# Patient Record
Sex: FEMALE | Race: BLACK OR AFRICAN AMERICAN | NOT HISPANIC OR LATINO | Employment: OTHER | URBAN - METROPOLITAN AREA
[De-identification: names, ages, dates, MRNs, and addresses within clinical notes are randomized per-mention and may not be internally consistent; named-entity substitution may affect disease eponyms.]

---

## 2023-03-22 DIAGNOSIS — I10 PRIMARY HYPERTENSION: Primary | ICD-10-CM

## 2023-03-23 RX ORDER — ENALAPRIL MALEATE 20 MG/1
40 TABLET ORAL DAILY
Qty: 90 TABLET | Refills: 1 | Status: SHIPPED | OUTPATIENT
Start: 2023-03-23 | End: 2023-06-27 | Stop reason: SDUPTHER

## 2023-03-23 RX ORDER — ENALAPRIL MALEATE 20 MG/1
2 TABLET ORAL DAILY
COMMUNITY
Start: 2012-02-15 | End: 2023-03-23 | Stop reason: SDUPTHER

## 2023-03-27 ENCOUNTER — HOSPITAL ENCOUNTER (OUTPATIENT)
Dept: DATA CONVERSION | Facility: HOSPITAL | Age: 68
End: 2023-03-28
Attending: INTERNAL MEDICINE | Admitting: INTERNAL MEDICINE
Payer: MEDICARE

## 2023-03-27 DIAGNOSIS — E78.5 HYPERLIPIDEMIA, UNSPECIFIED: ICD-10-CM

## 2023-03-27 DIAGNOSIS — I10 ESSENTIAL (PRIMARY) HYPERTENSION: ICD-10-CM

## 2023-03-27 DIAGNOSIS — R07.9 CHEST PAIN, UNSPECIFIED: ICD-10-CM

## 2023-03-27 DIAGNOSIS — I20.89 OTHER FORMS OF ANGINA PECTORIS (CMS-HCC): ICD-10-CM

## 2023-03-27 DIAGNOSIS — I25.118 ATHEROSCLEROTIC HEART DISEASE OF NATIVE CORONARY ARTERY WITH OTHER FORMS OF ANGINA PECTORIS (CMS-HCC): ICD-10-CM

## 2023-03-27 LAB
ANION GAP IN SER/PLAS: 11 MMOL/L (ref 10–20)
CALCIUM (MG/DL) IN SER/PLAS: 10.9 MG/DL (ref 8.6–10.6)
CARBON DIOXIDE, TOTAL (MMOL/L) IN SER/PLAS: 29 MMOL/L (ref 21–32)
CHLORIDE (MMOL/L) IN SER/PLAS: 102 MMOL/L (ref 98–107)
CREATININE (MG/DL) IN SER/PLAS: 0.88 MG/DL (ref 0.5–1.05)
ERYTHROCYTE DISTRIBUTION WIDTH (RATIO) BY AUTOMATED COUNT: 16 % (ref 11.5–14.5)
ERYTHROCYTE MEAN CORPUSCULAR HEMOGLOBIN CONCENTRATION (G/DL) BY AUTOMATED: 34.5 G/DL (ref 32–36)
ERYTHROCYTE MEAN CORPUSCULAR VOLUME (FL) BY AUTOMATED COUNT: 81 FL (ref 80–100)
ERYTHROCYTES (10*6/UL) IN BLOOD BY AUTOMATED COUNT: 3.6 X10E12/L (ref 4–5.2)
GFR FEMALE: 71 ML/MIN/1.73M2
GLUCOSE (MG/DL) IN SER/PLAS: 197 MG/DL (ref 74–99)
HEMATOCRIT (%) IN BLOOD BY AUTOMATED COUNT: 29 % (ref 36–46)
HEMOGLOBIN (G/DL) IN BLOOD: 10 G/DL (ref 12–16)
LEUKOCYTES (10*3/UL) IN BLOOD BY AUTOMATED COUNT: 7 X10E9/L (ref 4.4–11.3)
NRBC (PER 100 WBCS) BY AUTOMATED COUNT: 0 /100 WBC (ref 0–0)
PLATELETS (10*3/UL) IN BLOOD AUTOMATED COUNT: 207 X10E9/L (ref 150–450)
POTASSIUM (MMOL/L) IN SER/PLAS: 4.4 MMOL/L (ref 3.5–5.3)
SODIUM (MMOL/L) IN SER/PLAS: 138 MMOL/L (ref 136–145)
UREA NITROGEN (MG/DL) IN SER/PLAS: 24 MG/DL (ref 6–23)

## 2023-03-29 LAB
POC ACTIVATED CLOTTING TIME LOW RANGE: 254 SECONDS (ref 89–169)
POC ACTIVATED CLOTTING TIME LOW RANGE: 268 SECONDS (ref 89–169)
POC ACTIVATED CLOTTING TIME LOW RANGE: 289 SECONDS (ref 89–169)
POC ACTIVATED CLOTTING TIME LOW RANGE: 316 SECONDS (ref 89–169)

## 2023-05-23 LAB
ESTIMATED AVERAGE GLUCOSE FOR HBA1C: 177 MG/DL
HEMOGLOBIN A1C/HEMOGLOBIN TOTAL IN BLOOD: 7.8 %

## 2023-06-27 ENCOUNTER — TELEMEDICINE (OUTPATIENT)
Dept: PRIMARY CARE | Facility: CLINIC | Age: 68
End: 2023-06-27
Payer: MEDICARE

## 2023-06-27 DIAGNOSIS — Z95.820 S/P ANGIOPLASTY WITH STENT: ICD-10-CM

## 2023-06-27 DIAGNOSIS — Z98.84 S/P GASTRIC BYPASS: Primary | ICD-10-CM

## 2023-06-27 DIAGNOSIS — E11.9 DIABETES MELLITUS TYPE 2 IN NONOBESE (MULTI): ICD-10-CM

## 2023-06-27 DIAGNOSIS — I10 PRIMARY HYPERTENSION: ICD-10-CM

## 2023-06-27 PROBLEM — F80.81 STUTTERING: Status: ACTIVE | Noted: 2023-06-27

## 2023-06-27 PROBLEM — D64.9 ANEMIA: Status: ACTIVE | Noted: 2023-06-27

## 2023-06-27 PROBLEM — E78.5 HYPERLIPIDEMIA: Status: ACTIVE | Noted: 2023-06-27

## 2023-06-27 PROBLEM — E66.9 OBESITY: Status: ACTIVE | Noted: 2023-06-27

## 2023-06-27 PROBLEM — J30.9 ALLERGIC RHINITIS: Status: ACTIVE | Noted: 2023-06-27

## 2023-06-27 PROBLEM — M75.02 ADHESIVE CAPSULITIS OF BOTH SHOULDERS: Status: ACTIVE | Noted: 2023-06-27

## 2023-06-27 PROBLEM — G47.33 OSA (OBSTRUCTIVE SLEEP APNEA): Status: ACTIVE | Noted: 2023-06-27

## 2023-06-27 PROBLEM — Z98.61 STATUS POST CORONARY ANGIOPLASTY: Status: ACTIVE | Noted: 2023-06-27

## 2023-06-27 PROBLEM — M75.01 ADHESIVE CAPSULITIS OF BOTH SHOULDERS: Status: ACTIVE | Noted: 2023-06-27

## 2023-06-27 PROCEDURE — 99214 OFFICE O/P EST MOD 30 MIN: CPT | Performed by: FAMILY MEDICINE

## 2023-06-27 RX ORDER — ENALAPRIL MALEATE 20 MG/1
40 TABLET ORAL DAILY
Qty: 180 TABLET | Refills: 1 | Status: SHIPPED | OUTPATIENT
Start: 2023-06-27 | End: 2023-06-28 | Stop reason: SDUPTHER

## 2023-06-27 RX ORDER — ENALAPRIL MALEATE 20 MG/1
40 TABLET ORAL DAILY
Qty: 14 TABLET | Refills: 1 | Status: SHIPPED | OUTPATIENT
Start: 2023-06-27 | End: 2023-06-27 | Stop reason: SDUPTHER

## 2023-06-27 RX ORDER — ENALAPRIL MALEATE 20 MG/1
40 TABLET ORAL DAILY
Qty: 7 TABLET | Refills: 1 | Status: SHIPPED | OUTPATIENT
Start: 2023-06-27 | End: 2023-06-27 | Stop reason: SDUPTHER

## 2023-06-27 NOTE — PROGRESS NOTES
Subjective   Patient ID: Rita Rubi is a 68 y.o. female who presents for a routine follow-up visit and medication refills..      HPI  Ms Rubi is a 67 yo F with a history of  Coronary artery disease s/p PCI to mid LCx and distal RCA 03/27/2023 with BENITO.  She has ASCVD risk factors of hypertension, dyslipidemia, DM II on OHA and family history of premature CAD ( CABG in mother in her 40s and sudden death at age 50). Additionally she has a BMI of 33 after bariatric surgery procedure.    She is here today for her medication refill.  She states that her home BP is well controlled.   A review of system was completed.  All systems were reviewed and were normal, except for the ones that are listed in the HPI.    Objective   Physical Exam    Assessment/Plan   Problem List Items Addressed This Visit          Circulatory    Primary hypertension     -Controlled at home according to the patient.   -Medication refilled.         Relevant Medications    enalapril (Vasotec) 20 mg tablet    S/P angioplasty with stent     -currently appropriately on DAPT with aspirin and clopidogrel which she has been tolerating without cost or bleeding concerns. For 12 months and aspirin 81mg thereafter. . PO metoprolol succinate 25mg qd / atorvastatin 40mg qd / Sl nitroglycerin prn are also being tolerated.  -Follow-up with cardiologist as tolerated.   -Referral to a nutritionist declined today.            Digestive    S/P gastric bypass - Primary     -Diet and life style reemphasized.              Endocrine/Metabolic    Diabetes mellitus type 2 in nonobese (CMS/HCC)     -Improving control.   -Currently on Metformin.   -Jardiance recommended. Cost to be considered.

## 2023-06-27 NOTE — ASSESSMENT & PLAN NOTE
-currently appropriately on DAPT with aspirin and clopidogrel which she has been tolerating without cost or bleeding concerns. For 12 months and aspirin 81mg thereafter. . PO metoprolol succinate 25mg qd / atorvastatin 40mg qd / Sl nitroglycerin prn are also being tolerated.  -Follow-up with cardiologist as tolerated.   -Referral to a nutritionist declined today.

## 2023-06-28 DIAGNOSIS — I10 PRIMARY HYPERTENSION: ICD-10-CM

## 2023-06-28 NOTE — TELEPHONE ENCOUNTER
Patient calling because you canceled her Enalapril 20 mg prescription for 1 week at her local pharmacy whils she waits on her mail order. Please resubmit.

## 2023-06-30 RX ORDER — ENALAPRIL MALEATE 20 MG/1
40 TABLET ORAL DAILY
Qty: 14 TABLET | Refills: 0 | Status: SHIPPED | OUTPATIENT
Start: 2023-06-30 | End: 2024-01-25 | Stop reason: WASHOUT

## 2023-08-11 LAB
C REACTIVE PROTEIN (MG/L) IN SER/PLAS: 0.66 MG/DL
CHOLESTEROL (MG/DL) IN SER/PLAS: 128 MG/DL (ref 0–199)
CHOLESTEROL IN HDL (MG/DL) IN SER/PLAS: 46 MG/DL
CHOLESTEROL/HDL RATIO: 2.8
ESTIMATED AVERAGE GLUCOSE FOR HBA1C: 151 MG/DL
HEMOGLOBIN A1C/HEMOGLOBIN TOTAL IN BLOOD: 6.9 %
LDL: 63 MG/DL (ref 0–99)
TRIGLYCERIDE (MG/DL) IN SER/PLAS: 93 MG/DL (ref 0–149)
VLDL: 19 MG/DL (ref 0–40)

## 2023-09-07 VITALS — HEIGHT: 67 IN | BODY MASS INDEX: 32.7 KG/M2 | WEIGHT: 208.34 LBS

## 2023-09-11 ENCOUNTER — PATIENT OUTREACH (OUTPATIENT)
Dept: PRIMARY CARE | Facility: CLINIC | Age: 68
End: 2023-09-11
Payer: COMMERCIAL

## 2023-09-11 DIAGNOSIS — I63.9 CEREBROVASCULAR ACCIDENT (CVA), UNSPECIFIED MECHANISM (MULTI): ICD-10-CM

## 2023-09-11 DIAGNOSIS — E11.9 DIABETES MELLITUS TYPE 2 IN NONOBESE (MULTI): ICD-10-CM

## 2023-09-11 NOTE — PROGRESS NOTES
Discharge Facility: [facility]  Discharge Diagnosis: CVA  Admission Date: 9/9/23  Discharge Date: 9/10/23    PCP Appointment Date: 9/12/23 9:15am Dr Link    Specialist Appointment Date: TBD-I do not see scheduled appts as of this note    Follow-Up Appointment 02:  · Physician/Dept/Service Cardiology   · Call to Schedule in 2-3 days   · Comments Call to make an appointment.     Follow-Up Appointment 03:  · Physician/Dept/Service Neurology stroke   · Call to Schedule in 2 weeks   · Comments An appointment request has been sent to our [employer] service for scheduling.  If you do not receive a call in 2-3 business days, please contact the office and schedule your appointment. Please bring your ID, Insurance Card, Medication List and hospital discharge paperwork to your appointment.       Hospital Encounter and Summary: Linked         Noted on Discharge Summary:  Please take your blood pressure once a day, at a different time of the day each time, and keep a record. Take this record to your PCP appointment.    Continue all of your home medications as prescribed by your primary care doctor and cardiologist    Start taking your metformin tomorrow 9/11/2023    You will need to have an echocardiogram with bubble study done on an outpatient basis please call your cardiologist to have this test ordered    Come back tomorrow to the cardiology department and have your Holter monitor (zio patch) placed you will wear this for 2 weeks and send it back in for your cardiologist to read    Speak to your cardiologist about your medication called Plavix they may want to change it to something stronger due to your recent stroke.

## 2023-09-14 RX ORDER — METFORMIN HYDROCHLORIDE 500 MG/1
TABLET ORAL
Qty: 180 TABLET | Refills: 0 | Status: SHIPPED | OUTPATIENT
Start: 2023-09-14 | End: 2023-12-14

## 2023-09-14 NOTE — H&P
History & Physical Reviewed:   I have reviewed the History and Physical dated:  27-Feb-2023   History and Physical reviewed and relevant findings noted. Patient examined to review pertinent physical  findings.: No significant changes   Home Medications Reviewed: no changes noted   Allergies Reviewed: no changes noted       Airway/Sedation Assessment:  ·  Emotional Status calm   ·  Neurologic alert & oriented x 3   ·  Cardiovascular rhythm & rate regular   ·  GI/ soft, nontender     · Pulses present: Pedal Left, Pedal Right, Radial Left, Radial Right     ·  Mouth Opening OK yes   ·  Neck Flexibility OK yes   ·  Oropharyngeal Classification Class III   ·  ASA PS Classification ASA III   ·  Sedation Plan moderate sedation       ERAS (Enhanced Recovery After Surgery):  ·  ERAS Patient: no      Consent:   COVID-19 Consent:  ·  COVID-19 Risk Consent Surgeon has reviewed key risks related to the risk of leidy COVID-19 and if they contract COVID-19 what the risks are.     Attestation:   Note Completion:  I am a:  Resident/Fellow   Attending Attestation I saw and evaluated the patient.  I personally obtained the key and critical portions of the history and physical exam or was physically present for key and  critical portions performed by the resident/fellow. I reviewed the resident/fellow?s documentation and discussed the patient with the resident/fellow.  I agree with the resident/fellow?s medical decision making as documented in the note.     I personally evaluated the patient on 27-Mar-2023         Electronic Signatures:  Neela Rivas (Fellow))  (Signed 27-Mar-2023 10:20)   Authored: History & Physical Reviewed, Airway/Sedation,  Consent, Note Completion  Armond Leyva)  (Signed 29-Mar-2023 05:15)   Authored: YESSY, Note Completion   Co-Signer: History & Physical Reviewed, Airway/Sedation, Consent, Note Completion      Last Updated: 29-Mar-2023 05:15 by Armond Leyva)

## 2023-09-14 NOTE — DISCHARGE SUMMARY
Send Summary:   Discharge Summary Providers:  Provider Role Provider Name   · Attending Armond Leyva   · Referring Armond Leyva   · Primary Pretty Link       Note Recipients: Pretty Link MD Ukaigwe, Anene, MD       Discharge:    Summary:   Admission Date: .27-Mar-2023 08:57:00   Discharge Date: 28-Mar-2023   Attending Physician at Discharge: Jania Renee   Admission Reason: Observation s/p PCI   Final Discharge Diagnoses: S/P drug eluting coronary  stent placement, CAD (coronary artery disease)   Procedures: LHC/PCI   Condition at Discharge: Satisfactory   Disposition at Discharge: .Home   Vital Signs:        T   P  R  BP   MAP  SpO2   Value  36.4  87  16  94/57      99%  Date/Time 3/28 12:02 3/28 12:02 3/28 12:02 3/28 12:02    3/28 12:02  Range  (36C - 36.5C )  (80 - 96 )  (16 - 20 )  (94 - 145 )/ (57 - 76 )    (98% - 99% )    Date:            Weight/Scale Type:  Height:   27-Mar-2023 19:32  94.5  kg / standing  170.1  cm  Physical Exam:    HEENT:   EOMI, clear sclera, MMM, no lesions noted   RESP:     CTAB  CARDIO:  RRR, S1,S2  ABD:      Soft, nontender, + BS  EXT:       Bandage replaced over right radial access site, clean, no swelling or ecchymosis noted    Hospital Course:    Cath Attending: Dr. Leyva      03/27/23:  Rita Rubi is a 67 y/o F here for a LHC with possible PCI.   PMH: Her ASCVD risk factors are HTN, DLD, DM II with a FH of premature CAD. BMI of 33 after bariatric surgery procedure.  She had central chest pressure, worse with exertion & stress, relieved by rest.  She denies dyspnea, syncope or presyncope. No edema. The chest discomfort has been lifestyle limiting. This then led to evaluation by her PCP with a nuclear stress test.  This showed no perfusion defect consistent with ischemia.  CBC in Dec 2022, showed  anemia ( Hgb 10.4).    Pt. had  LHC/PCI via Right radial access. FFR was non significant of the LAD. Severe proximal LCx stenosis with a 3.0  x 18 mm BENITO was deployed. Successful PCI of dRCA into the PDA with a 2.25 x 22 mm deployed and post dilated with a 3.0 NC balloon. TR  Band applied for compression. Pt loaded on Plavix/ASA. Pt did stay the night for observation.     03/28/23:  Pt was seen this AM and will be discharged to home today. Access site checked and no swelling, oozing or any signs of hematoma noted. Instructions for activities to avoid this week, diet, medications and f/up appointments needed were all discussed. Pt.  express understanding.     REC:   -Continue with Dapt=Plavix 75 mg daily x min 6-12 months/ ASA 81 mg daily indefinitely  -Pt was ordered a 90 day supply of Plavix with 3 RF, which was picked up from [email or web address] pharmacy.  -Continue with high dose statin, BB, ACE and all other medications as previously prescribed.  -Hold Metformin for 48 hrs post procedure  -Cardiac rehab recommended  -Cardiac diet  -Will need to f/up with Dr. Leyva in 1-3 weeks post discharge.               Medical History:   S/P drug eluting coronary stent placement:    CAD (coronary artery disease):    Atypical chest pain:       Discharge Information:    and Continuing Care:   Lab Results - Pending:    None  Radiology Results - Pending: None   Discharge Instructions:    Activity:           May shower..            May not drive for 1 day(s).            No pushing, pulling, or lifting objects greater than 5 pounds for 1 week(s).            You are advised to go directly home from the hospital    Nutrition/Diet:           low cholesterol,  low fat    Wound Care:           Wound Site:   RIGHT WRIST          Wound Type:   surgical incision          Change Dressing:   daily          Cleanse With:   soap and water          Cover With:   SIMPLE BAND AID          Instructions:   no lotions, creams, or tub soaks          You may experience some tenderness, bruising or minimal inflammation.  If you have any concerns, you may contact the Cath Lab or if any of these  symptoms become excessive, contact your cardiologist or go to the emergency room          No tub baths, soaking, or swimming for one week          May shower the next day after your procedure    Discharge Medications: Home Medication   aspirin 81 mg oral delayed release tablet - 1 tab(s) orally once a day  enalapril 20 mg oral tablet - 2 tab(s) orally once a day  hydroCHLOROthiazide 25 mg oral tablet - 1 tab(s) orally once a day  Metoprolol Succinate ER 25 mg oral tablet, extended release - 1 tab(s) orally once a day  nitroglycerin 0.4 mg sublingual tablet - place 1 tablet under the tongue every 5 minutes for up to 3 doses as needed for chest pain, call 911 if pain persisits  omeprazole 20 mg oral delayed release capsule - 1 cap(s) orally once a day  empagliflozin 10 mg oral tablet - 1 tab(s) orally once a day  isosorbide mononitrate 30 mg oral tablet, extended release - 1 tab(s) orally once a day  atorvastatin 40 mg oral tablet - 1 tab(s) orally once a day (at bedtime)  Multiple Vitamins with Minerals oral tablet, chewable - 1 tab(s) orally 4 times a day ([employer])  metFORMIN 500 mg oral tablet - 1 tab(s) orally 2 times a day. RESUME TAKING ON ON 3/29/23  clopidogrel 75 mg oral tablet - 1 tab(s) orally once a day      PRN Medication     DNR Status:   ·  Code Status Code Status order at time of discharge: Full Code     Date & Time DC Summary Fax:   Date and time discharge summary faxed: 28-Mar-2023  13:26       Electronic Signatures:  Jania Renee (PAC)  (Signed 28-Mar-2023 13:26)   Authored: Send Summary, Summary Content, Medical History,  Ongoing Care, DNR Status, Date & Time DC Summary Fax, Note Completion      Last Updated: 28-Mar-2023 13:26 by Jania Renee (PAC)

## 2023-09-15 LAB
ALANINE AMINOTRANSFERASE (SGPT) (U/L) IN SER/PLAS: 13 U/L (ref 7–45)
ALBUMIN (G/DL) IN SER/PLAS: 3.8 G/DL (ref 3.4–5)
ALKALINE PHOSPHATASE (U/L) IN SER/PLAS: 35 U/L (ref 33–136)
ANION GAP IN SER/PLAS: 11 MMOL/L (ref 10–20)
ASPARTATE AMINOTRANSFERASE (SGOT) (U/L) IN SER/PLAS: 17 U/L (ref 9–39)
BILIRUBIN TOTAL (MG/DL) IN SER/PLAS: 0.6 MG/DL (ref 0–1.2)
CALCIUM (MG/DL) IN SER/PLAS: 9.5 MG/DL (ref 8.6–10.3)
CARBON DIOXIDE, TOTAL (MMOL/L) IN SER/PLAS: 29 MMOL/L (ref 21–32)
CHLORIDE (MMOL/L) IN SER/PLAS: 103 MMOL/L (ref 98–107)
CREATININE (MG/DL) IN SER/PLAS: 0.83 MG/DL (ref 0.5–1.05)
ERYTHROCYTE DISTRIBUTION WIDTH (RATIO) BY AUTOMATED COUNT: 16.6 % (ref 11.5–14.5)
ERYTHROCYTE MEAN CORPUSCULAR HEMOGLOBIN CONCENTRATION (G/DL) BY AUTOMATED: 34.7 G/DL (ref 32–36)
ERYTHROCYTE MEAN CORPUSCULAR VOLUME (FL) BY AUTOMATED COUNT: 80 FL (ref 80–100)
ERYTHROCYTES (10*6/UL) IN BLOOD BY AUTOMATED COUNT: 3.33 X10E12/L (ref 4–5.2)
GFR FEMALE: 75 ML/MIN/1.73M2
GLUCOSE (MG/DL) IN SER/PLAS: 107 MG/DL (ref 74–99)
HEMATOCRIT (%) IN BLOOD BY AUTOMATED COUNT: 26.5 % (ref 36–46)
HEMOGLOBIN (G/DL) IN BLOOD: 9.2 G/DL (ref 12–16)
LEUKOCYTES (10*3/UL) IN BLOOD BY AUTOMATED COUNT: 6.1 X10E9/L (ref 4.4–11.3)
PLATELETS (10*3/UL) IN BLOOD AUTOMATED COUNT: 178 X10E9/L (ref 150–450)
POTASSIUM (MMOL/L) IN SER/PLAS: 4.2 MMOL/L (ref 3.5–5.3)
PROTEIN TOTAL: 6.2 G/DL (ref 6.4–8.2)
SODIUM (MMOL/L) IN SER/PLAS: 139 MMOL/L (ref 136–145)
UREA NITROGEN (MG/DL) IN SER/PLAS: 20 MG/DL (ref 6–23)

## 2023-09-17 LAB — SARS-COV-2 RESULT: NOT DETECTED

## 2023-09-19 LAB
ANION GAP IN SER/PLAS: 11 MMOL/L (ref 10–20)
CALCIUM (MG/DL) IN SER/PLAS: 8.9 MG/DL (ref 8.6–10.3)
CARBON DIOXIDE, TOTAL (MMOL/L) IN SER/PLAS: 26 MMOL/L (ref 21–32)
CHLORIDE (MMOL/L) IN SER/PLAS: 108 MMOL/L (ref 98–107)
CREATININE (MG/DL) IN SER/PLAS: 0.67 MG/DL (ref 0.5–1.05)
ERYTHROCYTE DISTRIBUTION WIDTH (RATIO) BY AUTOMATED COUNT: 17.3 % (ref 11.5–14.5)
ERYTHROCYTE MEAN CORPUSCULAR HEMOGLOBIN CONCENTRATION (G/DL) BY AUTOMATED: 34.6 G/DL (ref 32–36)
ERYTHROCYTE MEAN CORPUSCULAR VOLUME (FL) BY AUTOMATED COUNT: 81 FL (ref 80–100)
ERYTHROCYTES (10*6/UL) IN BLOOD BY AUTOMATED COUNT: 3.16 X10E12/L (ref 4–5.2)
GFR FEMALE: >90 ML/MIN/1.73M2
GLUCOSE (MG/DL) IN SER/PLAS: 95 MG/DL (ref 74–99)
HEMATOCRIT (%) IN BLOOD BY AUTOMATED COUNT: 25.7 % (ref 36–46)
HEMOGLOBIN (G/DL) IN BLOOD: 8.9 G/DL (ref 12–16)
LEUKOCYTES (10*3/UL) IN BLOOD BY AUTOMATED COUNT: 6.2 X10E9/L (ref 4.4–11.3)
PLATELETS (10*3/UL) IN BLOOD AUTOMATED COUNT: 170 X10E9/L (ref 150–450)
POTASSIUM (MMOL/L) IN SER/PLAS: 4.2 MMOL/L (ref 3.5–5.3)
SODIUM (MMOL/L) IN SER/PLAS: 141 MMOL/L (ref 136–145)
UREA NITROGEN (MG/DL) IN SER/PLAS: 16 MG/DL (ref 6–23)

## 2023-09-23 LAB
APPEARANCE, URINE: ABNORMAL
BILIRUBIN, URINE: NEGATIVE
BLOOD, URINE: NEGATIVE
BUDDING YEAST, URINE: PRESENT /HPF
CALCIUM OXALATE CRYSTALS, URINE: ABNORMAL /HPF
COLOR, URINE: YELLOW
GLUCOSE, URINE: ABNORMAL MG/DL
KETONES, URINE: NEGATIVE MG/DL
LEUKOCYTE ESTERASE, URINE: ABNORMAL
MUCUS, URINE: ABNORMAL /LPF
NITRITE, URINE: NEGATIVE
PH, URINE: 5 (ref 5–8)
PROTEIN, URINE: NEGATIVE MG/DL
RBC, URINE: 109 /HPF (ref 0–5)
SPECIFIC GRAVITY, URINE: 1.03 (ref 1–1.03)
SQUAMOUS EPITHELIAL CELLS, URINE: 1 /HPF
UROBILINOGEN, URINE: <2 MG/DL (ref 0–1.9)
WBC CLUMPS, URINE: ABNORMAL /HPF
WBC, URINE: 144 /HPF (ref 0–5)

## 2023-09-25 ENCOUNTER — TELEPHONE (OUTPATIENT)
Dept: PRIMARY CARE | Facility: CLINIC | Age: 68
End: 2023-09-25
Payer: COMMERCIAL

## 2023-09-25 LAB — URINE CULTURE: NORMAL

## 2023-09-26 LAB
ANION GAP IN SER/PLAS: 12 MMOL/L (ref 10–20)
CALCIUM (MG/DL) IN SER/PLAS: 9.3 MG/DL (ref 8.6–10.3)
CARBON DIOXIDE, TOTAL (MMOL/L) IN SER/PLAS: 25 MMOL/L (ref 21–32)
CHLORIDE (MMOL/L) IN SER/PLAS: 109 MMOL/L (ref 98–107)
CREATININE (MG/DL) IN SER/PLAS: 0.72 MG/DL (ref 0.5–1.05)
ERYTHROCYTE DISTRIBUTION WIDTH (RATIO) BY AUTOMATED COUNT: 18.6 % (ref 11.5–14.5)
ERYTHROCYTE MEAN CORPUSCULAR HEMOGLOBIN CONCENTRATION (G/DL) BY AUTOMATED: 34.3 G/DL (ref 32–36)
ERYTHROCYTE MEAN CORPUSCULAR VOLUME (FL) BY AUTOMATED COUNT: 81 FL (ref 80–100)
ERYTHROCYTES (10*6/UL) IN BLOOD BY AUTOMATED COUNT: 3.12 X10E12/L (ref 4–5.2)
GFR FEMALE: 89 ML/MIN/1.73M2
GLUCOSE (MG/DL) IN SER/PLAS: 75 MG/DL (ref 74–99)
HEMATOCRIT (%) IN BLOOD BY AUTOMATED COUNT: 25.4 % (ref 36–46)
HEMOGLOBIN (G/DL) IN BLOOD: 8.7 G/DL (ref 12–16)
LEUKOCYTES (10*3/UL) IN BLOOD BY AUTOMATED COUNT: 6.6 X10E9/L (ref 4.4–11.3)
PLATELETS (10*3/UL) IN BLOOD AUTOMATED COUNT: 207 X10E9/L (ref 150–450)
POTASSIUM (MMOL/L) IN SER/PLAS: 4.4 MMOL/L (ref 3.5–5.3)
SODIUM (MMOL/L) IN SER/PLAS: 142 MMOL/L (ref 136–145)
UREA NITROGEN (MG/DL) IN SER/PLAS: 12 MG/DL (ref 6–23)

## 2023-09-28 NOTE — TELEPHONE ENCOUNTER
The patient needs an appointment or a virtual visit in order to put in a referral order for home care.  Please assist with scheduling.  For insurance purposes, and the necessity for home care needs to be documented in her chart during the visit.

## 2023-10-06 ENCOUNTER — APPOINTMENT (OUTPATIENT)
Dept: VASCULAR MEDICINE | Facility: HOSPITAL | Age: 68
End: 2023-10-06
Payer: MEDICARE

## 2023-10-06 PROBLEM — I63.9 ISCHEMIC STROKE (MULTI): Status: ACTIVE | Noted: 2023-10-06

## 2023-10-06 PROBLEM — I10 BENIGN ESSENTIAL HYPERTENSION: Status: ACTIVE | Noted: 2023-10-06

## 2023-10-06 PROBLEM — I25.118 CORONARY ARTERY DISEASE INVOLVING NATIVE CORONARY ARTERY WITH OTHER FORM OF ANGINA PECTORIS, UNSPECIFIED WHETHER NATIVE OR TRANSPLANTED HEART (CMS-HCC): Status: ACTIVE | Noted: 2023-10-06

## 2023-10-06 PROBLEM — L85.3 XEROSIS CUTIS: Status: ACTIVE | Noted: 2021-01-18

## 2023-10-06 PROBLEM — E78.00 HYPERCHOLESTEREMIA: Status: ACTIVE | Noted: 2023-10-06

## 2023-10-06 PROBLEM — R47.01 EXPRESSIVE APHASIA: Status: ACTIVE | Noted: 2023-10-06

## 2023-10-06 PROBLEM — I63.311 CEREBRAL INFARCTION DUE TO THROMBOSIS OF RIGHT MIDDLE CEREBRAL ARTERY (MULTI): Status: ACTIVE | Noted: 2023-10-06

## 2023-10-06 PROBLEM — I63.9 CVA (CEREBRAL VASCULAR ACCIDENT) (MULTI): Status: ACTIVE | Noted: 2023-10-06

## 2023-10-06 PROBLEM — Z78.0 ASYMPTOMATIC MENOPAUSE: Status: ACTIVE | Noted: 2023-10-06

## 2023-10-06 PROBLEM — L81.0 POSTINFLAMMATORY HYPERPIGMENTATION: Status: ACTIVE | Noted: 2021-01-18

## 2023-10-06 PROBLEM — L85.8 OTHER SPECIFIED EPIDERMAL THICKENING: Status: ACTIVE | Noted: 2021-01-18

## 2023-10-06 PROBLEM — E11.9 DIABETES MELLITUS (MULTI): Status: ACTIVE | Noted: 2023-10-06

## 2023-10-06 PROBLEM — I69.354 HEMIPARESIS OF LEFT NONDOMINANT SIDE AS LATE EFFECT OF CEREBRAL INFARCTION (MULTI): Status: ACTIVE | Noted: 2023-10-06

## 2023-10-06 PROBLEM — I10 HTN (HYPERTENSION): Status: ACTIVE | Noted: 2023-10-06

## 2023-10-06 PROBLEM — R07.89 ATYPICAL CHEST PAIN: Status: ACTIVE | Noted: 2023-10-06

## 2023-10-06 PROBLEM — L98.9 SKIN LESION: Status: ACTIVE | Noted: 2023-10-06

## 2023-10-06 PROBLEM — I25.10 ATHEROSCLEROTIC CARDIOVASCULAR DISEASE: Status: ACTIVE | Noted: 2023-10-06

## 2023-10-06 PROBLEM — R13.10 DYSPHAGIA: Status: ACTIVE | Noted: 2023-10-06

## 2023-10-06 RX ORDER — NITROGLYCERIN 0.4 MG/1
1 TABLET SUBLINGUAL EVERY 5 MIN PRN
COMMUNITY
Start: 2023-02-13 | End: 2024-05-17 | Stop reason: SDUPTHER

## 2023-10-06 RX ORDER — OMEPRAZOLE 20 MG/1
1 CAPSULE, DELAYED RELEASE ORAL
COMMUNITY
Start: 2023-03-12 | End: 2023-12-12 | Stop reason: WASHOUT

## 2023-10-06 RX ORDER — MULTIVITAMIN WITH IRON
1 TABLET ORAL DAILY
COMMUNITY
Start: 2013-09-03 | End: 2023-12-12 | Stop reason: WASHOUT

## 2023-10-06 RX ORDER — METOPROLOL SUCCINATE 25 MG/1
0.5 TABLET, EXTENDED RELEASE ORAL DAILY
COMMUNITY
Start: 2023-02-13 | End: 2024-01-25 | Stop reason: SDUPTHER

## 2023-10-06 RX ORDER — ISOSORBIDE MONONITRATE 30 MG/1
1 TABLET, EXTENDED RELEASE ORAL DAILY
COMMUNITY
End: 2024-01-25 | Stop reason: SDUPTHER

## 2023-10-06 RX ORDER — VIT C/E/ZN/COPPR/LUTEIN/ZEAXAN 250MG-90MG
1 CAPSULE ORAL DAILY
COMMUNITY
Start: 2016-10-18 | End: 2023-12-08 | Stop reason: ALTCHOICE

## 2023-10-06 RX ORDER — ATORVASTATIN CALCIUM 40 MG/1
1 TABLET, FILM COATED ORAL NIGHTLY
COMMUNITY
End: 2024-01-18 | Stop reason: WASHOUT

## 2023-10-06 RX ORDER — MULTIVIT-MIN/IRON FUM/FOLIC AC 7.5 MG-4
1 TABLET ORAL 4 TIMES DAILY
COMMUNITY
End: 2023-12-12 | Stop reason: WASHOUT

## 2023-10-06 RX ORDER — ENALAPRIL MALEATE 20 MG/1
2 TABLET ORAL DAILY
COMMUNITY
Start: 2012-02-15 | End: 2024-01-25 | Stop reason: WASHOUT

## 2023-10-06 RX ORDER — EMPAGLIFLOZIN 10 MG/1
1 TABLET, FILM COATED ORAL DAILY
COMMUNITY
End: 2024-01-24 | Stop reason: SDUPTHER

## 2023-10-06 RX ORDER — ATENOLOL 100 MG/1
100 TABLET ORAL DAILY
COMMUNITY
End: 2024-01-25 | Stop reason: WASHOUT

## 2023-10-06 RX ORDER — LORATADINE 10 MG/1
1 TABLET ORAL DAILY PRN
Status: ON HOLD | COMMUNITY
Start: 2019-04-04 | End: 2024-05-01 | Stop reason: WASHOUT

## 2023-10-06 RX ORDER — FLUTICASONE PROPIONATE 50 MCG
SPRAY, SUSPENSION (ML) NASAL
COMMUNITY
Start: 2023-09-28 | End: 2024-02-15 | Stop reason: SDUPTHER

## 2023-10-06 RX ORDER — ATENOLOL 50 MG/1
TABLET ORAL
COMMUNITY
Start: 2017-04-28 | End: 2024-01-25 | Stop reason: WASHOUT

## 2023-10-06 RX ORDER — BLOOD SUGAR DIAGNOSTIC
1 STRIP MISCELLANEOUS DAILY
Status: ON HOLD | COMMUNITY
Start: 2013-02-20 | End: 2024-05-01 | Stop reason: WASHOUT

## 2023-10-06 RX ORDER — CLOPIDOGREL BISULFATE 75 MG/1
TABLET ORAL
COMMUNITY
Start: 2023-03-28 | End: 2024-01-25 | Stop reason: SDUPTHER

## 2023-10-06 RX ORDER — DOCUSATE SODIUM 100 MG/1
CAPSULE, LIQUID FILLED ORAL
COMMUNITY
Start: 2023-09-28 | End: 2023-12-08 | Stop reason: SDUPTHER

## 2023-10-06 RX ORDER — SIMVASTATIN 40 MG/1
TABLET, FILM COATED ORAL
COMMUNITY
Start: 2011-12-06 | End: 2023-12-08 | Stop reason: ALTCHOICE

## 2023-10-06 RX ORDER — FUROSEMIDE 20 MG/1
TABLET ORAL
COMMUNITY
Start: 2023-09-28 | End: 2023-11-10 | Stop reason: SDUPTHER

## 2023-10-06 RX ORDER — TRIAMCINOLONE ACETONIDE 55 UG/1
SPRAY, METERED NASAL
Status: ON HOLD | COMMUNITY
Start: 2019-04-04 | End: 2024-05-01 | Stop reason: WASHOUT

## 2023-10-06 RX ORDER — ATORVASTATIN CALCIUM 80 MG/1
TABLET, FILM COATED ORAL
COMMUNITY
Start: 2023-09-18 | End: 2024-01-18

## 2023-10-06 RX ORDER — PHENTERMINE HYDROCHLORIDE 37.5 MG/1
1 TABLET ORAL
COMMUNITY
Start: 2022-08-09 | End: 2023-12-12 | Stop reason: WASHOUT

## 2023-10-06 RX ORDER — ASPIRIN 325 MG
TABLET, DELAYED RELEASE (ENTERIC COATED) ORAL
COMMUNITY
Start: 2022-11-23 | End: 2023-12-08 | Stop reason: ALTCHOICE

## 2023-10-06 RX ORDER — HYDROCHLOROTHIAZIDE 25 MG/1
1 TABLET ORAL DAILY
COMMUNITY
Start: 2012-04-16 | End: 2024-01-25 | Stop reason: WASHOUT

## 2023-10-06 RX ORDER — ASPIRIN 81 MG/1
1 TABLET ORAL DAILY
COMMUNITY
Start: 2023-02-13 | End: 2024-02-15 | Stop reason: SDUPTHER

## 2023-10-06 RX ORDER — ENALAPRIL MALEATE 20 MG/1
1 TABLET ORAL 2 TIMES DAILY
COMMUNITY
Start: 2013-12-16 | End: 2023-11-09 | Stop reason: SDUPTHER

## 2023-10-09 ENCOUNTER — APPOINTMENT (OUTPATIENT)
Dept: NEUROLOGY | Facility: HOSPITAL | Age: 68
End: 2023-10-09
Payer: MEDICARE

## 2023-10-10 ENCOUNTER — TELEPHONE (OUTPATIENT)
Dept: PRIMARY CARE | Facility: CLINIC | Age: 68
End: 2023-10-10

## 2023-10-10 ENCOUNTER — HOSPITAL ENCOUNTER (OUTPATIENT)
Dept: CARDIOLOGY | Facility: HOSPITAL | Age: 68
Discharge: HOME | End: 2023-10-10
Payer: MEDICARE

## 2023-10-10 ENCOUNTER — E-VISIT (OUTPATIENT)
Dept: PRIMARY CARE | Facility: CLINIC | Age: 68
End: 2023-10-10
Payer: COMMERCIAL

## 2023-10-10 ENCOUNTER — LAB (OUTPATIENT)
Dept: LAB | Facility: LAB | Age: 68
End: 2023-10-10
Payer: MEDICARE

## 2023-10-10 VITALS
WEIGHT: 201 LBS | SYSTOLIC BLOOD PRESSURE: 139 MMHG | BODY MASS INDEX: 31.48 KG/M2 | OXYGEN SATURATION: 99 % | DIASTOLIC BLOOD PRESSURE: 83 MMHG | TEMPERATURE: 98 F | HEART RATE: 96 BPM

## 2023-10-10 DIAGNOSIS — I10 PRIMARY HYPERTENSION: ICD-10-CM

## 2023-10-10 DIAGNOSIS — I63.9 CEREBRAL INFARCTION, UNSPECIFIED (MULTI): ICD-10-CM

## 2023-10-10 DIAGNOSIS — G45.8 OTHER TRANSIENT CEREBRAL ISCHEMIC ATTACKS AND RELATED SYNDROMES: ICD-10-CM

## 2023-10-10 DIAGNOSIS — D64.9 ANEMIA, UNSPECIFIED TYPE: ICD-10-CM

## 2023-10-10 DIAGNOSIS — I25.10 ATHEROSCLEROTIC CARDIOVASCULAR DISEASE: Primary | ICD-10-CM

## 2023-10-10 DIAGNOSIS — I63.9 CEREBROVASCULAR ACCIDENT (CVA), UNSPECIFIED MECHANISM (MULTI): ICD-10-CM

## 2023-10-10 DIAGNOSIS — I63.311 CEREBRAL INFARCTION DUE TO THROMBOSIS OF RIGHT MIDDLE CEREBRAL ARTERY (MULTI): ICD-10-CM

## 2023-10-10 DIAGNOSIS — E11.9 DIABETES MELLITUS TYPE 2 IN NONOBESE (MULTI): ICD-10-CM

## 2023-10-10 DIAGNOSIS — G47.33 OSA (OBSTRUCTIVE SLEEP APNEA): ICD-10-CM

## 2023-10-10 DIAGNOSIS — Z09 HOSPITAL DISCHARGE FOLLOW-UP: ICD-10-CM

## 2023-10-10 DIAGNOSIS — Z98.84 S/P GASTRIC BYPASS: ICD-10-CM

## 2023-10-10 DIAGNOSIS — I63.9 ACUTE ISCHEMIC STROKE (MULTI): ICD-10-CM

## 2023-10-10 DIAGNOSIS — I25.118 CORONARY ARTERY DISEASE INVOLVING NATIVE CORONARY ARTERY WITH OTHER FORM OF ANGINA PECTORIS, UNSPECIFIED WHETHER NATIVE OR TRANSPLANTED HEART (CMS-HCC): ICD-10-CM

## 2023-10-10 LAB
ALBUMIN SERPL BCP-MCNC: 4.6 G/DL (ref 3.4–5)
ALP SERPL-CCNC: 46 U/L (ref 33–136)
ALT SERPL W P-5'-P-CCNC: 12 U/L (ref 7–45)
ANION GAP SERPL CALC-SCNC: 16 MMOL/L (ref 10–20)
AST SERPL W P-5'-P-CCNC: 14 U/L (ref 9–39)
BILIRUB SERPL-MCNC: 0.6 MG/DL (ref 0–1.2)
BUN SERPL-MCNC: 28 MG/DL (ref 6–23)
CALCIUM SERPL-MCNC: 11 MG/DL (ref 8.6–10.6)
CHLORIDE SERPL-SCNC: 102 MMOL/L (ref 98–107)
CO2 SERPL-SCNC: 29 MMOL/L (ref 21–32)
CREAT SERPL-MCNC: 1.03 MG/DL (ref 0.5–1.05)
EJECTION FRACTION APICAL 4 CHAMBER: 54.3
ERYTHROCYTE [DISTWIDTH] IN BLOOD BY AUTOMATED COUNT: 17.8 % (ref 11.5–14.5)
GFR SERPL CREATININE-BSD FRML MDRD: 59 ML/MIN/1.73M*2
GLUCOSE SERPL-MCNC: 94 MG/DL (ref 74–99)
HCT VFR BLD AUTO: 33.3 % (ref 36–46)
HGB BLD-MCNC: 11.2 G/DL (ref 12–16)
MCH RBC QN AUTO: 27.9 PG (ref 26–34)
MCHC RBC AUTO-ENTMCNC: 33.6 G/DL (ref 32–36)
MCV RBC AUTO: 83 FL (ref 80–100)
NRBC BLD-RTO: 0 /100 WBCS (ref 0–0)
PLATELET # BLD AUTO: 242 X10*3/UL (ref 150–450)
PMV BLD AUTO: 10.3 FL (ref 7.5–11.5)
POTASSIUM SERPL-SCNC: 3.5 MMOL/L (ref 3.5–5.3)
PROT SERPL-MCNC: 7.9 G/DL (ref 6.4–8.2)
RBC # BLD AUTO: 4.01 X10*6/UL (ref 4–5.2)
SODIUM SERPL-SCNC: 143 MMOL/L (ref 136–145)
WBC # BLD AUTO: 7.9 X10*3/UL (ref 4.4–11.3)

## 2023-10-10 PROCEDURE — 4010F ACE/ARB THERAPY RXD/TAKEN: CPT | Performed by: FAMILY MEDICINE

## 2023-10-10 PROCEDURE — 3079F DIAST BP 80-89 MM HG: CPT | Performed by: FAMILY MEDICINE

## 2023-10-10 PROCEDURE — 93246 EXT ECG>7D<15D RECORDING: CPT

## 2023-10-10 PROCEDURE — 3044F HG A1C LEVEL LT 7.0%: CPT | Performed by: FAMILY MEDICINE

## 2023-10-10 PROCEDURE — 3075F SYST BP GE 130 - 139MM HG: CPT | Performed by: FAMILY MEDICINE

## 2023-10-10 PROCEDURE — 1125F AMNT PAIN NOTED PAIN PRSNT: CPT | Performed by: FAMILY MEDICINE

## 2023-10-10 PROCEDURE — 85027 COMPLETE CBC AUTOMATED: CPT

## 2023-10-10 PROCEDURE — 99495 TRANSJ CARE MGMT MOD F2F 14D: CPT | Performed by: FAMILY MEDICINE

## 2023-10-10 PROCEDURE — 36415 COLL VENOUS BLD VENIPUNCTURE: CPT

## 2023-10-10 PROCEDURE — 2500000004 HC RX 250 GENERAL PHARMACY W/ HCPCS (ALT 636 FOR OP/ED): Performed by: STUDENT IN AN ORGANIZED HEALTH CARE EDUCATION/TRAINING PROGRAM

## 2023-10-10 PROCEDURE — 93306 TTE W/DOPPLER COMPLETE: CPT | Performed by: INTERNAL MEDICINE

## 2023-10-10 PROCEDURE — 80053 COMPREHEN METABOLIC PANEL: CPT

## 2023-10-10 PROCEDURE — 3008F BODY MASS INDEX DOCD: CPT | Performed by: FAMILY MEDICINE

## 2023-10-10 PROCEDURE — 93306 TTE W/DOPPLER COMPLETE: CPT

## 2023-10-10 RX ADMIN — PERFLUTREN 0.5 ML: 6.52 INJECTION, SUSPENSION INTRAVENOUS at 10:13

## 2023-10-10 ASSESSMENT — PAIN SCALES - GENERAL: PAINLEVEL: 4

## 2023-10-10 NOTE — TELEPHONE ENCOUNTER
Virgilio Anderson called in to follow up on patients next office visit (today) regarding home care services. They have been having a hard time reaching her by phone but they have hopes to begin home care on Monday.

## 2023-10-10 NOTE — ASSESSMENT & PLAN NOTE
Acute ischemic stroke:  -Rt carona radiata with punctate infarct on MRI, likely small vessel in etiology.   -Considered failure of DAPT as other risk factors appear well controlled.  -Holter monitor in place.  -continue asa/plavix for now.  -Neurology referral pending.  -Passed swallow study.  -Weakness and fall risk with uncoordination- continue PT/OT/ speech therapy.

## 2023-10-10 NOTE — ASSESSMENT & PLAN NOTE
-HTN  home meds metoprolol, enalapril and Imdur will hold enalapril to allow for permissive HTN and add when accepatable  - CAD recent angioplasty c/w Imdur and bb    -Hold metformin  - insulin  sliding scale

## 2023-10-10 NOTE — PROGRESS NOTES
Subjective   Patient ID: Rita Rubi is a 68 y.o. female who presents for Hospital Follow-up.  HPI  The patient is a  69 yo AA Female with a history of coronary artery disease, angioplasty with stents March 2023, hypertension, diabetes mellitus type II, gastroesophageal reflux, depression, anemia, CVAassociated to a failure of DAPT, here for a post hospitalization follow-up visit from 9/09-9/11/2023, then returned on  9/13/2023 and discharge to rehab on 9/14/2023..    Patient was admitted for an ischemic CVA associated to  from a failure of DAPT.    She was discharged initially home but returned to the hospital the next day to rehab:    HTN/ DM II/ HLD/ CAD  will stop HCTZ and change AceI to daily to allow for a bit higher bp per neuro recs.   Outpt F/u with cardiology for requested.   K replaced    CAD.  Atorvastatin increased from 40 to 80 mg at bedtime.    Dc to rehab on 9/14/2023- thru 9/29/2023.    Currently ambulates without assistance at home.   Scheduled to start home PT/OT and speech therapy on 10/16/2023.   BP is well controlled at home.     A review of system was completed.  All systems were reviewed and were normal, except for the ones that are listed in the HPI.    Objective   Physical Exam  Constitutional:       Appearance: Normal appearance.   HENT:      Head: Normocephalic and atraumatic.      Right Ear: Tympanic membrane, ear canal and external ear normal.      Left Ear: Tympanic membrane, ear canal and external ear normal.      Nose: Nose normal.      Mouth/Throat:      Mouth: Mucous membranes are moist.      Pharynx: Oropharynx is clear.   Eyes:      Extraocular Movements: Extraocular movements intact.      Conjunctiva/sclera: Conjunctivae normal.      Pupils: Pupils are equal, round, and reactive to light.   Cardiovascular:      Rate and Rhythm: Normal rate and regular rhythm.      Pulses: Normal pulses.   Pulmonary:      Effort: Pulmonary effort is normal.      Breath sounds: Normal breath  sounds.   Abdominal:      General: Abdomen is flat. Bowel sounds are normal.      Palpations: Abdomen is soft.   Musculoskeletal:         General: Normal range of motion.      Cervical back: Normal range of motion and neck supple.      Comments: -Ambulates with a walker today.   Skin:     General: Skin is warm.   Neurological:      General: No focal deficit present.      Mental Status: She is alert and oriented to person, place, and time. Mental status is at baseline.   Psychiatric:         Mood and Affect: Mood normal.         Behavior: Behavior normal.         Thought Content: Thought content normal.         Judgment: Judgment normal.     Assessment/Plan   Problem List Items Addressed This Visit       Diabetes mellitus type 2 in nonobese (CMS/[facility])    Anemia     -worsening.  -repeat CBC w/ diff and FIT ordered today.          Relevant Orders    CBC    Fecal Occult Blood Immunoassy    TYLER (obstructive sleep apnea)    S/P gastric bypass    Cerebral infarction due to thrombosis of right middle cerebral artery (CMS/[facility])    CVA (cerebral vascular accident) (CMS/[facility])    Atherosclerotic cardiovascular disease - Primary    HTN (hypertension)     -controlled at home.   - stopped hydrochlorothiazide in the hospital and resumed at discharge.  -Out pt F/u with cardiology for requested.   -K replaced  -continue metoprolol, enalapril and Imdur   -resumed enalapril (initially held due to CVA)         Relevant Orders    Comprehensive Metabolic Panel    Coronary artery disease involving native coronary artery with other form of angina pectoris, unspecified whether native or transplanted heart (CMS/[facility])     -atorvastatin increased from 40 to 80 mg at bedtime.  -blood test in 12 weeks.  -Furosemide 20 mg PRN for now.         Hospital discharge follow-up     -HTN  home meds metoprolol, enalapril and Imdur will hold enalapril to allow for permissive HTN and add when accepatable  - CAD recent angioplasty c/w Imdur and bb    -Hold  metformin  - insulin  sliding scale             Acute ischemic stroke (CMS/HCC)     Acute ischemic stroke:  -Rt carona radiata with punctate infarct on MRI, likely small vessel in etiology.   -Considered failure of DAPT as other risk factors appear well controlled.  -Holter monitor in place.  -continue asa/plavix for now.  -Neurology referral pending.  -Passed swallow study.  -Weakness and fall risk with uncoordination- continue PT/OT/ speech therapy.         Patient to return to office 4 weeks.

## 2023-10-10 NOTE — ASSESSMENT & PLAN NOTE
-atorvastatin increased from 40 to 80 mg at bedtime.  -blood test in 12 weeks.  -Furosemide 20 mg PRN for now.

## 2023-10-10 NOTE — ASSESSMENT & PLAN NOTE
-controlled at home.   - stopped hydrochlorothiazide in the hospital and resumed at discharge.  -Out pt F/u with cardiology for requested.   -K replaced  -continue metoprolol, enalapril and Imdur   -resumed enalapril (initially held due to CVA)

## 2023-10-11 ENCOUNTER — TELEPHONE (OUTPATIENT)
Dept: CARDIOLOGY | Facility: HOSPITAL | Age: 68
End: 2023-10-11
Payer: COMMERCIAL

## 2023-10-11 ENCOUNTER — HOSPITAL ENCOUNTER (OUTPATIENT)
Dept: VASCULAR MEDICINE | Facility: HOSPITAL | Age: 68
Discharge: HOME | End: 2023-10-11
Payer: COMMERCIAL

## 2023-10-11 DIAGNOSIS — I63.9 CEREBRAL INFARCTION, UNSPECIFIED (MULTI): ICD-10-CM

## 2023-10-11 DIAGNOSIS — G45.8 OTHER TRANSIENT CEREBRAL ISCHEMIC ATTACKS AND RELATED SYNDROMES: ICD-10-CM

## 2023-10-12 DIAGNOSIS — E83.52 HYPERCALCEMIA: Primary | ICD-10-CM

## 2023-10-19 ENCOUNTER — HOSPITAL ENCOUNTER (OUTPATIENT)
Dept: RADIOLOGY | Facility: HOSPITAL | Age: 68
Discharge: HOME | End: 2023-10-19
Payer: MEDICARE

## 2023-10-19 DIAGNOSIS — M25.562 LEFT KNEE PAIN, UNSPECIFIED CHRONICITY: ICD-10-CM

## 2023-10-19 PROCEDURE — 73562 X-RAY EXAM OF KNEE 3: CPT | Mod: LT

## 2023-10-20 ENCOUNTER — APPOINTMENT (OUTPATIENT)
Dept: CARDIOLOGY | Facility: HOSPITAL | Age: 68
End: 2023-10-20
Payer: MEDICARE

## 2023-10-31 ENCOUNTER — OFFICE VISIT (OUTPATIENT)
Dept: ORTHOPEDIC SURGERY | Facility: CLINIC | Age: 68
End: 2023-10-31
Payer: MEDICARE

## 2023-10-31 VITALS — HEIGHT: 67 IN | WEIGHT: 200 LBS | BODY MASS INDEX: 31.39 KG/M2

## 2023-10-31 DIAGNOSIS — M17.12 PRIMARY OSTEOARTHRITIS OF LEFT KNEE: Primary | ICD-10-CM

## 2023-10-31 DIAGNOSIS — G89.29 CHRONIC PAIN OF LEFT KNEE: ICD-10-CM

## 2023-10-31 DIAGNOSIS — M25.562 CHRONIC PAIN OF LEFT KNEE: ICD-10-CM

## 2023-10-31 PROCEDURE — 1159F MED LIST DOCD IN RCRD: CPT | Performed by: ORTHOPAEDIC SURGERY

## 2023-10-31 PROCEDURE — 2500000005 HC RX 250 GENERAL PHARMACY W/O HCPCS: Performed by: ORTHOPAEDIC SURGERY

## 2023-10-31 PROCEDURE — 1125F AMNT PAIN NOTED PAIN PRSNT: CPT | Performed by: ORTHOPAEDIC SURGERY

## 2023-10-31 PROCEDURE — 1160F RVW MEDS BY RX/DR IN RCRD: CPT | Performed by: ORTHOPAEDIC SURGERY

## 2023-10-31 PROCEDURE — 2500000004 HC RX 250 GENERAL PHARMACY W/ HCPCS (ALT 636 FOR OP/ED): Performed by: ORTHOPAEDIC SURGERY

## 2023-10-31 PROCEDURE — 3008F BODY MASS INDEX DOCD: CPT | Performed by: ORTHOPAEDIC SURGERY

## 2023-10-31 PROCEDURE — 3079F DIAST BP 80-89 MM HG: CPT | Performed by: ORTHOPAEDIC SURGERY

## 2023-10-31 PROCEDURE — 1036F TOBACCO NON-USER: CPT | Performed by: ORTHOPAEDIC SURGERY

## 2023-10-31 PROCEDURE — 3075F SYST BP GE 130 - 139MM HG: CPT | Performed by: ORTHOPAEDIC SURGERY

## 2023-10-31 PROCEDURE — 4010F ACE/ARB THERAPY RXD/TAKEN: CPT | Performed by: ORTHOPAEDIC SURGERY

## 2023-10-31 PROCEDURE — 99214 OFFICE O/P EST MOD 30 MIN: CPT | Performed by: ORTHOPAEDIC SURGERY

## 2023-10-31 PROCEDURE — 99204 OFFICE O/P NEW MOD 45 MIN: CPT | Performed by: ORTHOPAEDIC SURGERY

## 2023-10-31 PROCEDURE — 3044F HG A1C LEVEL LT 7.0%: CPT | Performed by: ORTHOPAEDIC SURGERY

## 2023-10-31 ASSESSMENT — PAIN SCALES - GENERAL: PAINLEVEL_OUTOF10: 4

## 2023-10-31 ASSESSMENT — PAIN DESCRIPTION - DESCRIPTORS: DESCRIPTORS: ACHING

## 2023-10-31 ASSESSMENT — PAIN - FUNCTIONAL ASSESSMENT: PAIN_FUNCTIONAL_ASSESSMENT: 0-10

## 2023-10-31 NOTE — LETTER
November 1, 2023     Pretty Link MD  [address]  [facility] [id number]    Patient: Rita Rubi   YOB: 1955   Date of Visit: 10/31/2023       Dear Dr. Pretty Link MD:    Thank you for referring Rita Rubi to me for evaluation. Below are my notes for this consultation.  If you have questions, please do not hesitate to call me. I look forward to following your patient along with you.       Sincerely,     George Seay MD      CC: No Recipients  ______________________________________________________________________________________    Chief complaint is left knee pain chronic induration  Hurts with activity relieved by rest no real treatment so far  Symptoms moderate  Past medical,family and social histories have been reviewed and are up to date.  All other body systems have been reviewed and are negative for complaint.  Constitutional: Well-developed well-nourished   Eyes: Sclerae anicteric, pupils equal and round  HENT: Normocephalic atraumatic  Cardiovascular: Pulses full, regular rate and rhythm  Respiratory: Breathing not labored, no wheezing  Integumentary: Skin intact, no lesions or rashes  Neurological: Sensation intact, no gross strength deficits, reflexes equal  Psychiatric: Alert oriented and appropriate  Hematologic/lymphatic: No lymphadenopathy  Left knee: Palpable osteophytes medially and no deformity small effusion generalized joint line tenderness and crepitus mobility is preserved x-rays show tricompartment arthritis stage III  Impression knee arthritis discussed natural history  Injected knee today discussed activity modification indications for knee replacement surgery  All questions answered  L Inj/Asp: L knee on 11/1/2023 11:53 AM  Indications: pain, joint swelling and diagnostic evaluation  Details: 21 G needle, anterolateral approach  Medications: 40 mg methylPREDNISolone acetate 40 mg/mL; 2 mL lidocaine 20 mg/mL  (2 %)  Outcome: tolerated well, no immediate complications  Procedure, treatment alternatives, risks and benefits explained, specific risks discussed. Consent was given by the patient. Immediately prior to procedure a time out was called to verify the correct patient, procedure, equipment, support staff and site/side marked as required. Patient was prepped and draped in the usual sterile fashion.

## 2023-11-01 PROCEDURE — 20610 DRAIN/INJ JOINT/BURSA W/O US: CPT | Performed by: ORTHOPAEDIC SURGERY

## 2023-11-01 RX ORDER — METHYLPREDNISOLONE ACETATE 40 MG/ML
40 INJECTION, SUSPENSION INTRA-ARTICULAR; INTRALESIONAL; INTRAMUSCULAR; SOFT TISSUE
Status: COMPLETED | OUTPATIENT
Start: 2023-11-01 | End: 2023-11-01

## 2023-11-01 RX ORDER — LIDOCAINE HYDROCHLORIDE 20 MG/ML
2 INJECTION, SOLUTION INFILTRATION; PERINEURAL
Status: COMPLETED | OUTPATIENT
Start: 2023-11-01 | End: 2023-11-01

## 2023-11-01 RX ADMIN — LIDOCAINE HYDROCHLORIDE 2 ML: 20 INJECTION, SOLUTION INFILTRATION; PERINEURAL at 11:53

## 2023-11-01 RX ADMIN — METHYLPREDNISOLONE ACETATE 40 MG: 40 INJECTION, SUSPENSION INTRALESIONAL; INTRAMUSCULAR; INTRASYNOVIAL; SOFT TISSUE at 11:53

## 2023-11-01 NOTE — PROGRESS NOTES
Chief complaint is left knee pain chronic induration  Hurts with activity relieved by rest no real treatment so far  Symptoms moderate  Past medical,family and social histories have been reviewed and are up to date.  All other body systems have been reviewed and are negative for complaint.  Constitutional: Well-developed well-nourished   Eyes: Sclerae anicteric, pupils equal and round  HENT: Normocephalic atraumatic  Cardiovascular: Pulses full, regular rate and rhythm  Respiratory: Breathing not labored, no wheezing  Integumentary: Skin intact, no lesions or rashes  Neurological: Sensation intact, no gross strength deficits, reflexes equal  Psychiatric: Alert oriented and appropriate  Hematologic/lymphatic: No lymphadenopathy  Left knee: Palpable osteophytes medially and no deformity small effusion generalized joint line tenderness and crepitus mobility is preserved x-rays show tricompartment arthritis stage III  Impression knee arthritis discussed natural history  Injected knee today discussed activity modification indications for knee replacement surgery  All questions answered  L Inj/Asp: L knee on 11/1/2023 11:53 AM  Indications: pain, joint swelling and diagnostic evaluation  Details: 21 G needle, anterolateral approach  Medications: 40 mg methylPREDNISolone acetate 40 mg/mL; 2 mL lidocaine 20 mg/mL (2 %)  Outcome: tolerated well, no immediate complications  Procedure, treatment alternatives, risks and benefits explained, specific risks discussed. Consent was given by the patient. Immediately prior to procedure a time out was called to verify the correct patient, procedure, equipment, support staff and site/side marked as required. Patient was prepped and draped in the usual sterile fashion.

## 2023-11-13 ENCOUNTER — TELEPHONE (OUTPATIENT)
Dept: CARDIOLOGY | Facility: HOSPITAL | Age: 68
End: 2023-11-13
Payer: COMMERCIAL

## 2023-11-13 NOTE — TELEPHONE ENCOUNTER
Called to update on test results.  No cranial vessel abnormalities, arrhythmias or inter-atrial shunt or masses.    Continue secondary prevention strategies.

## 2023-12-08 ENCOUNTER — OFFICE VISIT (OUTPATIENT)
Dept: CARDIOLOGY | Facility: HOSPITAL | Age: 68
End: 2023-12-08
Payer: MEDICARE

## 2023-12-08 VITALS
WEIGHT: 197 LBS | OXYGEN SATURATION: 98 % | BODY MASS INDEX: 29.86 KG/M2 | HEIGHT: 68 IN | SYSTOLIC BLOOD PRESSURE: 144 MMHG | DIASTOLIC BLOOD PRESSURE: 83 MMHG | HEART RATE: 78 BPM

## 2023-12-08 DIAGNOSIS — I10 PRIMARY HYPERTENSION: Primary | ICD-10-CM

## 2023-12-08 DIAGNOSIS — K59.00 CONSTIPATION, UNSPECIFIED CONSTIPATION TYPE: ICD-10-CM

## 2023-12-08 DIAGNOSIS — Z98.61 STATUS POST CORONARY ANGIOPLASTY: ICD-10-CM

## 2023-12-08 PROCEDURE — 99213 OFFICE O/P EST LOW 20 MIN: CPT | Performed by: INTERNAL MEDICINE

## 2023-12-08 PROCEDURE — 3008F BODY MASS INDEX DOCD: CPT | Performed by: INTERNAL MEDICINE

## 2023-12-08 PROCEDURE — 1160F RVW MEDS BY RX/DR IN RCRD: CPT | Performed by: INTERNAL MEDICINE

## 2023-12-08 PROCEDURE — 4010F ACE/ARB THERAPY RXD/TAKEN: CPT | Performed by: INTERNAL MEDICINE

## 2023-12-08 PROCEDURE — 1159F MED LIST DOCD IN RCRD: CPT | Performed by: INTERNAL MEDICINE

## 2023-12-08 PROCEDURE — 1036F TOBACCO NON-USER: CPT | Performed by: INTERNAL MEDICINE

## 2023-12-08 PROCEDURE — 3044F HG A1C LEVEL LT 7.0%: CPT | Performed by: INTERNAL MEDICINE

## 2023-12-08 PROCEDURE — 3079F DIAST BP 80-89 MM HG: CPT | Performed by: INTERNAL MEDICINE

## 2023-12-08 PROCEDURE — 1126F AMNT PAIN NOTED NONE PRSNT: CPT | Performed by: INTERNAL MEDICINE

## 2023-12-08 PROCEDURE — 3077F SYST BP >= 140 MM HG: CPT | Performed by: INTERNAL MEDICINE

## 2023-12-08 RX ORDER — DOCUSATE SODIUM 100 MG/1
100 CAPSULE, LIQUID FILLED ORAL 2 TIMES DAILY PRN
Qty: 60 CAPSULE | Refills: 3 | Status: SHIPPED | OUTPATIENT
Start: 2023-12-08

## 2023-12-08 RX ORDER — AMLODIPINE BESYLATE 5 MG/1
5 TABLET ORAL DAILY
Qty: 90 EACH | Refills: 3 | Status: SHIPPED | OUTPATIENT
Start: 2023-12-08 | End: 2024-01-25 | Stop reason: SDUPTHER

## 2023-12-08 RX ORDER — FUROSEMIDE 20 MG/1
20 TABLET ORAL DAILY
Qty: 30 TABLET | Refills: 0 | Status: SHIPPED | OUTPATIENT
Start: 2023-12-08 | End: 2024-01-11 | Stop reason: SDUPTHER

## 2023-12-08 ASSESSMENT — PAIN SCALES - GENERAL: PAINLEVEL: 0-NO PAIN

## 2023-12-08 NOTE — PROGRESS NOTES
Chief Complaint:   Follow-up     History Of Present Illness:    Rita Rubi is a 68 y.o. female presenting with ASCVD risk factors of hypertension, dyslipidemia, DM II on OHA and family history of premature CAD ( CABG in mother in her 40s and sudden death at age 50). Additionally she has a BMI of 33 after bariatric surgery procedure. She had chest pain and a negative nuclear stress test. Due to a high pre-test probability for occlusive coronary artery disease despite negative stress test, coronary angiography was done showing occlusive CAD. This was treated with PCI March 2023. The angina symptoms resolved. She has no cost concerns with respect to medications.    She has been on DAPT with aspirin and clopidogrel without bleeding concerns. She is appropriately on high intensity statins.      Sept 9 2023 she presented with clinical features of a stroke. Confirmed on Brain MRI at right corona radiata. She has left hemiparesis and speech impediment ( stuttering) is more pronounced.    She has been worked up and no reversible causes found. She is on appropriate secondary prevention.    Her home BP logs showed blood pressure readings 140s - 160s/80-90s.         TESTING  -Lipid panel Aug 2023- LDL 63, HDl 46,  No dyslipidemia  -TTE Oct 2023- LVEF 65%, No RWMA, impaired relaxation, mild AR, no other valve abnormalities. No interatrial shunt.  -Brain MRI Sept 2023â€“ punctate diffusion abnormality in the right corona radiata. No intracranial hemorrhage. No significant vascular abnormalities  - CTA HEAD Sept 2023 - No occlusive extra-cranial or intracranial vascular disease  - Hgb A1C 6.8  - Holter Oct 2023 - No atrial arrhythmias             PAST MEDICAL HISTORY  CAD - 80% mid LCx stenosis, 90% distal RCA stenosis, 50% proximal LAD stenosis. The LAD ( RFR 0.92, FFR 0.90) therefore PCI of mid Left circumflex and distal RCA were performed 03/27/2023 via right radial access.  Obesity  DM II on OHA  Dyslipidemia  Bariatric  "surgery      Last Recorded Vitals:  Vitals:    12/08/23 1328   BP: 144/83   BP Location: Left arm   Patient Position: Sitting   BP Cuff Size: Adult   Pulse: 78   SpO2: 98%   Weight: 89.4 kg (197 lb)   Height: 1.727 m (5' 8\")       Past Medical History:  She has a past medical history of Disorder of the skin and subcutaneous tissue, unspecified (03/09/2021) and Gastro-esophageal reflux disease with esophagitis, without bleeding (12/18/2013).    Past Surgical History:  She has a past surgical history that includes Total abdominal hysterectomy (09/05/2013); Cataract extraction (03/14/2017); Cataract extraction (01/02/2014); Gastric restriction surgery (08/14/2015); MR angio head wo IV contrast (9/10/2023); and MR angio neck wo IV contrast (9/10/2023).      Social History:  She reports that she has never smoked. She has never been exposed to tobacco smoke. She has never used smokeless tobacco. She reports that she does not currently use alcohol. She reports that she does not use drugs.    Family History:  Family History   Problem Relation Name Age of Onset    Heart attack Mother      Other (esophagus cancer) Father          Allergies:  House dust, Loratadine, Other, and Pollen extracts    Outpatient Medications:  Current Outpatient Medications   Medication Instructions    amLODIPine (NORVASC) 5 mg, oral, Daily    aspirin 81 mg EC tablet 1 tablet, oral, Daily    atenolol (Tenormin) 50 mg tablet     atenolol (TENORMIN) 100 mg, oral, Daily    atorvastatin (Lipitor) 40 mg tablet 1 tablet, oral, Nightly    atorvastatin (Lipitor) 80 mg tablet     clopidogrel (Plavix) 75 mg tablet     docusate sodium (COLACE) 100 mg, oral, 2 times daily PRN    enalapril (Vasotec) 20 mg tablet 2 tablets, oral, Daily    enalapril (VASOTEC) 40 mg, oral, Daily    enalapril (VASOTEC) 20 mg, oral, 2 times daily    fluticasone (Flonase) 50 mcg/actuation nasal spray     FreeStyle Test strip 1 strip, subdermal, Daily    furosemide (LASIX) 20 mg, oral, " Daily    hydroCHLOROthiazide (HYDRODiuril) 25 mg tablet 1 tablet, oral, Daily    isosorbide mononitrate ER (Imdur) 30 mg 24 hr tablet 1 tablet, oral, Daily, As directed    Jardiance 10 mg 1 tablet, oral, Daily    loratadine (Claritin) 10 mg tablet 1 tablet, oral, Daily PRN    metFORMIN (Glucophage) 500 mg tablet TAKE 1 TABLET TWICE DAILY WITH MORNING AND EVENING MEALS    metoprolol succinate XL (Toprol-XL) 25 mg 24 hr tablet 0.5 tablets, oral, Daily    multivitamin (Multiple Vitamins) tablet 1 tablet, oral, Daily    multivitamin capsule 1 capsule, oral, Daily    multivitamin with minerals tablet 1 tablet, oral, 4 times daily    mv,calcium,min/iron/folic/vitK (OPTISOURCE ORAL) oral    nitroglycerin (Nitrostat) 0.4 mg SL tablet 1 tablet, sublingual, Every 5 min PRN    omeprazole (PriLOSEC) 20 mg DR capsule 1 capsule, oral, Daily before breakfast    ONETOUCH ULTRASOFT LANCETS MISC miscellaneous, Use as directed    phentermine (Adipex-P) 37.5 mg tablet 1 tablet, oral, Daily with breakfast    triamcinolone (Nasacort) 55 mcg nasal inhaler nasal, Use as directed.<BR>    VITAMIN E ACETATE ORAL oral       Physical Exam:  Physical Exam  Vitals reviewed.   Constitutional:       Appearance: Normal appearance.   HENT:      Head: Normocephalic.   Eyes:      Pupils: Pupils are equal, round, and reactive to light.   Cardiovascular:      Rate and Rhythm: Normal rate and regular rhythm.      Pulses: Normal pulses.      Heart sounds: No murmur heard.     No gallop.   Abdominal:      General: There is no distension.   Musculoskeletal:         General: No swelling.   Skin:     General: Skin is warm.      Coloration: Skin is not jaundiced.   Neurological:      Mental Status: She is alert.      Cranial Nerves: No cranial nerve deficit.   Psychiatric:         Mood and Affect: Mood normal.           Last Labs:  CBC -  Lab Results   Component Value Date    WBC 7.9 10/10/2023    HGB 11.2 (L) 10/10/2023    HCT 33.3 (L) 10/10/2023    MCV 83  10/10/2023     10/10/2023       CMP -  Lab Results   Component Value Date    CALCIUM 11.0 (H) 10/10/2023    PROT 7.9 10/10/2023    ALBUMIN 4.6 10/10/2023    AST 14 10/10/2023    ALT 12 10/10/2023    ALKPHOS 46 10/10/2023    BILITOT 0.6 10/10/2023       LIPID PANEL -   Lab Results   Component Value Date    CHOL 128 08/11/2023    TRIG 93 08/11/2023    HDL 46.0 08/11/2023    CHHDL 2.8 08/11/2023    LDLF 63 08/11/2023    VLDL 19 08/11/2023       RENAL FUNCTION PANEL -   Lab Results   Component Value Date    GLUCOSE 94 10/10/2023     10/10/2023    K 3.5 10/10/2023     10/10/2023    CO2 29 10/10/2023    ANIONGAP 16 10/10/2023    BUN 28 (H) 10/10/2023    CREATININE 1.03 10/10/2023    GFRMALE CANCELED 09/15/2023    CALCIUM 11.0 (H) 10/10/2023    ALBUMIN 4.6 10/10/2023        Lab Results   Component Value Date    HGBA1C 6.8 (A) 09/10/2023       ASSESSMENT AND PLAN  1. Recent right corona radiata CVA with left hemiparesis. Sept 2023  2. CAD s/p PCI March 2023  3. DM II  4. HTN  5. Dyslipidemia    She is doing well from a cardiovascular perspective.  Following up with neurology for resumption of driving after rehab.    She has suboptimal BP control therefore amlodipine 5mg every day was added.    Follow up in 3 months or earlier if concerns.                      Armond Leyva MD

## 2023-12-11 ENCOUNTER — APPOINTMENT (OUTPATIENT)
Dept: NEUROLOGY | Facility: HOSPITAL | Age: 68
End: 2023-12-11
Payer: MEDICARE

## 2023-12-12 ENCOUNTER — OFFICE VISIT (OUTPATIENT)
Dept: NEUROLOGY | Facility: HOSPITAL | Age: 68
End: 2023-12-12
Payer: MEDICARE

## 2023-12-12 ENCOUNTER — TELEPHONE (OUTPATIENT)
Dept: PRIMARY CARE | Facility: CLINIC | Age: 68
End: 2023-12-12
Payer: COMMERCIAL

## 2023-12-12 VITALS
HEART RATE: 78 BPM | RESPIRATION RATE: 18 BRPM | SYSTOLIC BLOOD PRESSURE: 140 MMHG | TEMPERATURE: 97.1 F | BODY MASS INDEX: 29.86 KG/M2 | DIASTOLIC BLOOD PRESSURE: 75 MMHG | HEIGHT: 68 IN | WEIGHT: 197 LBS

## 2023-12-12 DIAGNOSIS — I63.81 CEREBROVASCULAR ACCIDENT (CVA) DUE TO OCCLUSION OF SMALL ARTERY (MULTI): Primary | ICD-10-CM

## 2023-12-12 PROCEDURE — 4010F ACE/ARB THERAPY RXD/TAKEN: CPT | Performed by: PSYCHIATRY & NEUROLOGY

## 2023-12-12 PROCEDURE — 99215 OFFICE O/P EST HI 40 MIN: CPT | Performed by: PSYCHIATRY & NEUROLOGY

## 2023-12-12 PROCEDURE — 3078F DIAST BP <80 MM HG: CPT | Performed by: PSYCHIATRY & NEUROLOGY

## 2023-12-12 PROCEDURE — 3044F HG A1C LEVEL LT 7.0%: CPT | Performed by: PSYCHIATRY & NEUROLOGY

## 2023-12-12 PROCEDURE — 1036F TOBACCO NON-USER: CPT | Performed by: PSYCHIATRY & NEUROLOGY

## 2023-12-12 PROCEDURE — 1159F MED LIST DOCD IN RCRD: CPT | Performed by: PSYCHIATRY & NEUROLOGY

## 2023-12-12 PROCEDURE — 3077F SYST BP >= 140 MM HG: CPT | Performed by: PSYCHIATRY & NEUROLOGY

## 2023-12-12 PROCEDURE — 1126F AMNT PAIN NOTED NONE PRSNT: CPT | Performed by: PSYCHIATRY & NEUROLOGY

## 2023-12-12 PROCEDURE — 99205 OFFICE O/P NEW HI 60 MIN: CPT | Performed by: PSYCHIATRY & NEUROLOGY

## 2023-12-12 PROCEDURE — 3008F BODY MASS INDEX DOCD: CPT | Performed by: PSYCHIATRY & NEUROLOGY

## 2023-12-12 PROCEDURE — 1160F RVW MEDS BY RX/DR IN RCRD: CPT | Performed by: PSYCHIATRY & NEUROLOGY

## 2023-12-12 ASSESSMENT — PAIN SCALES - GENERAL: PAINLEVEL: 0-NO PAIN

## 2023-12-12 NOTE — TELEPHONE ENCOUNTER
Copied from CRM #[id number]. Topic: Needs Earlier Appointment  >> Dec 1, 2023 12:10 PM Kelly SILVA wrote:  pt is asking for the office to give her a call for a appt schedule is not showing in epic thank you

## 2023-12-12 NOTE — PROGRESS NOTES
Subjective     HPI    Rita Rubi is a 68 y.o. year old right handed female presenting for follow up.       9/9-10/2023: presented [facility] with speech change and left leg weakness, /68, sx resolved in ED. MRI R corona radiata punctate infarct. Seen by Dr. Santiago in [facility]. P2y12 testing recommended but deferred to outpatient (along with echocardiogram, ziopatch. Patient's risk factors noted to be already well-controlled. Continued on ASA / plavix which pt was already on due to PCI in 3/2023.  9/12-14/2023: presented to [facility] ED again for worsening gait difficulty, speech issues. /72. Evaluation by Dr. Sheth - stuttering lacune. No imaging repeated. Continued on ASA / plavix, Discharged to SNF.     Discharged from rehab 9/29.     Seen by cardiologist, Dr. Leyva, post admission, echocardiogram with bubble was done, atorva increased to 80, holter reviewed.     Vascular risk factors: HTN, HLD, obesity s/p bariatric surgery, CAD with PCI 3/2023 on DAPT (ASA / plavix)  Evaluation:  MRI brain 9/10/2023: R corona radiata DWI lesion, FLAIR pontine lesion   CTA head / neck 9/9/2023: no significant flow-limiting stenosis   Carotid ultrasound 9/10/2023: no stenosis  MRA 9/10/2023: no stenosis   CTH 9/12/2023: no hemorrhage  TTE 10/2023: EF 65%, normal LA, negative bubble study   A1c 6.8 (8/2023)   LDL 63 (8/2023)     Seen 12/12/2023. Reports doing well. Has not been driving because she was waiting for this neurology appointment for clearance.     ROS all other systems negative.       Patient Active Problem List   Diagnosis    Primary hypertension    Diabetes mellitus type 2 in nonobese (CMS/HCC)    Adhesive capsulitis of both shoulders    Allergic rhinitis    Anemia    Hyperlipidemia    TYLER (obstructive sleep apnea)    Obesity    S/P angioplasty with stent    Status post coronary angioplasty    S/P gastric bypass    Stuttering    Atypical chest pain    Cerebral infarction due to thrombosis of right middle  cerebral artery (CMS/HCC)    CVA (cerebral vascular accident) (CMS/HCC)    Diabetes mellitus (CMS/HCC)    Diet-controlled type 2 diabetes mellitus (CMS/HCC)    Dysphagia    Expressive aphasia    Hemiparesis of left nondominant side as late effect of cerebral infarction (CMS/HCC)    Postinflammatory hyperpigmentation    Other specified epidermal thickening    Skin lesion    Xerosis cutis    Hypercholesteremia    Atherosclerotic cardiovascular disease    Benign essential hypertension    HTN (hypertension)    Coronary artery disease involving native coronary artery with other form of angina pectoris, unspecified whether native or transplanted heart (CMS/HCC)    Asymptomatic menopause    Ischemic stroke (CMS/HCC)    Hospital discharge follow-up    Acute ischemic stroke (CMS/[facility])    Hypercalcemia     Past Medical History:   Diagnosis Date    Disorder of the skin and subcutaneous tissue, unspecified 03/09/2021    Skin lesion    Gastro-esophageal reflux disease with esophagitis, without bleeding 12/18/2013    Gastro-esophageal reflux disease with esophagitis     Past Surgical History:   Procedure Laterality Date    CATARACT EXTRACTION  03/14/2017    Cataract Surgery    CATARACT EXTRACTION  01/02/2014    Cataract Surgery    GASTRIC RESTRICTION SURGERY  08/14/2015    Gastric Surgery For Morbid Obesity    MR HEAD ANGIO WO IV CONTRAST  9/10/2023    MR HEAD ANGIO WO IV CONTRAST 9/10/2023 [facility] MRI    MR NECK ANGIO WO IV CONTRAST  9/10/2023    MR NECK ANGIO WO IV CONTRAST 9/10/2023 [facility] MRI    TOTAL ABDOMINAL HYSTERECTOMY  09/05/2013    Total Abdominal Hysterectomy     Social History     Tobacco Use    Smoking status: Never     Passive exposure: Never    Smokeless tobacco: Never   Substance Use Topics    Alcohol use: Not Currently     family history includes Heart attack in her mother; esophagus cancer in her father.    Current Outpatient Medications:     amLODIPine (Norvasc) 5 mg tablet, Take 1 tablet (5 mg) by mouth once daily.,  Disp: 90 each, Rfl: 3    aspirin 81 mg EC tablet, Take 1 tablet (81 mg) by mouth once daily., Disp: , Rfl:     atenolol (Tenormin) 100 mg tablet, Take 1 tablet (100 mg) by mouth once daily., Disp: , Rfl:     atenolol (Tenormin) 50 mg tablet, , Disp: , Rfl:     atorvastatin (Lipitor) 40 mg tablet, Take 1 tablet (40 mg) by mouth once daily at bedtime., Disp: , Rfl:     atorvastatin (Lipitor) 80 mg tablet, , Disp: , Rfl:     clopidogrel (Plavix) 75 mg tablet, , Disp: , Rfl:     docusate sodium (Colace) 100 mg capsule, Take 1 capsule (100 mg) by mouth 2 times a day as needed for constipation., Disp: 60 capsule, Rfl: 3    enalapril (Vasotec) 20 mg tablet, Take 2 tablets (40 mg) by mouth once daily., Disp: 14 tablet, Rfl: 0    enalapril (Vasotec) 20 mg tablet, Take 2 tablets (40 mg) by mouth once daily., Disp: , Rfl:     enalapril (Vasotec) 20 mg tablet, Take 1 tablet (20 mg) by mouth 2 times a day., Disp: 180 tablet, Rfl: 0    fluticasone (Flonase) 50 mcg/actuation nasal spray, , Disp: , Rfl:     FreeStyle Test strip, 1 strip by subdermal route once daily., Disp: , Rfl:     furosemide (Lasix) 20 mg tablet, Take 1 tablet (20 mg) by mouth once daily., Disp: 30 tablet, Rfl: 0    hydroCHLOROthiazide (HYDRODiuril) 25 mg tablet, Take 1 tablet (25 mg) by mouth once daily., Disp: , Rfl:     isosorbide mononitrate ER (Imdur) 30 mg 24 hr tablet, Take 1 tablet (30 mg) by mouth once daily. As directed, Disp: , Rfl:     Jardiance 10 mg, Take 1 tablet (10 mg) by mouth once daily., Disp: , Rfl:     loratadine (Claritin) 10 mg tablet, Take 1 tablet (10 mg) by mouth once daily as needed., Disp: , Rfl:     metFORMIN (Glucophage) 500 mg tablet, TAKE 1 TABLET TWICE DAILY WITH MORNING AND EVENING MEALS, Disp: 180 tablet, Rfl: 0    metoprolol succinate XL (Toprol-XL) 25 mg 24 hr tablet, Take 0.5 tablets (12.5 mg) by mouth once daily., Disp: , Rfl:     mv,calcium,min/iron/folic/vitK (OPTISOURCE ORAL), Take by mouth., Disp: , Rfl:      "nitroglycerin (Nitrostat) 0.4 mg SL tablet, Place 1 tablet (0.4 mg) under the tongue every 5 minutes if needed for chest pain., Disp: , Rfl:     ONETOUCH ULTRASOFT LANCETS MISC, Use as directed, Disp: , Rfl:     triamcinolone (Nasacort) 55 mcg nasal inhaler, Administer into affected nostril(s). Use as directed., Disp: , Rfl:     VITAMIN E ACETATE ORAL, Take by mouth., Disp: , Rfl:   Allergies   Allergen Reactions    House Dust Unknown     And mold - sinus congestion    Loratadine Unknown    Other Other    Pollen Extracts Itching and Hives     Sinus clog up                       Objective     Visit Vitals  /75   Pulse 78   Temp 36.2 °C (97.1 °F)   Resp 18   Ht 1.727 m (5' 8\")   Wt 89.4 kg (197 lb)   LMP  (LMP Unknown)   BMI 29.95 kg/m²   OB Status Unknown   Smoking Status Never   BSA 2.07 m²     Body mass index is 29.95 kg/m².                  Neurological Exam  Mental Status  Awake, alert and oriented to person, place and time.    Cranial Nerves  CN II: Visual fields full to confrontation.  CN III, IV, VI: Extraocular movements intact bilaterally.  CN VII: Full and symmetric facial movement.  CN VIII: Hearing is normal.  CN IX, X: Palate elevates symmetrically  CN XI: Shoulder shrug strength is normal.  CN XII: Tongue midline without atrophy or fasciculations.    Motor  Normal muscle bulk throughout. Normal muscle tone. Strength is 5/5 throughout all four extremities.    Sensory  Light touch is normal in upper and lower extremities.     Reflexes                                            Right                      Left  Brachioradialis                    1+                         1+  Biceps                                 1+                         1+  Triceps                                1+                         1+  Hamstring                            1+                         1+  Patellar                                1+                         1+  Achilles                                1+           "               1+  Right Plantar: downgoing  Left Plantar: downgoing    Coordination  Right: Finger-to-nose normal. Heel-to-shin normal.Left: Finger-to-nose normal. Heel-to-shin normal.    Gait  Casual gait is normal including stance, stride, and arm swing.    Physical Exam  Eyes:      Extraocular Movements: Extraocular movements intact.   Neurological:      Motor: Motor strength is normal.     Deep Tendon Reflexes:      Reflex Scores:       Tricep reflexes are 1+ on the right side and 1+ on the left side.       Bicep reflexes are 1+ on the right side and 1+ on the left side.       Brachioradialis reflexes are 1+ on the right side and 1+ on the left side.       Patellar reflexes are 1+ on the right side and 1+ on the left side.       Achilles reflexes are 1+ on the right side and 1+ on the left side.        Extensive review of notes in EMR, labs, tests   HDL   Date Value Ref Range Status   08/11/2023 46.0 mg/dL Final     Comment:     .      AGE      VERY LOW   LOW     NORMAL    HIGH       0-19 Y       < 35   < 40     40-45     ----    20-24 Y       ----   < 40       >45     ----      >24 Y       ----   < 40     40-60      >60  .       Cholesterol/HDL Ratio   Date Value Ref Range Status   08/11/2023 2.8  Final     Comment:     REF VALUES  DESIRABLE  < 3.4  HIGH RISK  > 5.0       VLDL   Date Value Ref Range Status   08/11/2023 19 0 - 40 mg/dL Final     Triglycerides   Date Value Ref Range Status   08/11/2023 93 0 - 149 mg/dL Final     Comment:     .      AGE      DESIRABLE   BORDERLINE HIGH   HIGH     VERY HIGH   0 D-90 D    19 - 174         ----         ----        ----  91 D- 9 Y     0 -  74        75 -  99     >/= 100      ----    10-19 Y     0 -  89        90 - 129     >/= 130      ----    20-24 Y     0 - 114       115 - 149     >/= 150      ----         >24 Y     0 - 149       150 - 199    200- 499    >/= 500  .   Venipuncture immediately after or during the    administration of Metamizole may lead to falsely    low results. Testing should be performed immediately   prior to Metamizole dosing.       Hemoglobin A1C   Date Value Ref Range Status   09/10/2023 6.8 (A) % Final     Comment:          Diagnosis of Diabetes-Adults   Non-Diabetic: < or = 5.6%   Increased risk for developing diabetes: 5.7-6.4%   Diagnostic of diabetes: > or = 6.5%  .       Monitoring of Diabetes                Age (y)     Therapeutic Goal (%)   Adults:          >18           <7.0   Pediatrics:    13-18           <7.5                   7-12           <8.0                   0- 6            7.5-8.5   American Diabetes Association. Diabetes Care 33(S1), Jan 2010.       Estimated Average Glucose   Date Value Ref Range Status   09/10/2023 148 MG/DL Final     Creatinine   Date Value Ref Range Status   10/10/2023 1.03 0.50 - 1.05 mg/dL Final     eGFR   Date Value Ref Range Status   10/10/2023 59 (L) >60 mL/min/1.73m*2 Final     Comment:     Calculations of estimated GFR are performed using the 2021 CKD-EPI Study Refit equation without the race variable for the IDMS-Traceable creatinine methods.  https://jasn.asnjournals.org/content/early/2021/09/22/ASN.0675524464     Protime   Date Value Ref Range Status   09/12/2023 11.2 9.8 - 12.8 sec Final     Comment:     Note new reference range as of 6/20/2023 at 10:00am.     INR   Date Value Ref Range Status   09/12/2023 1.0 0.9 - 1.1 Final     Results for orders placed during the hospital encounter of 10/10/23    Transthoracic Echo (TTE) Complete    Narrative  [facility], [address] [place] [address]  Tel [phone] and Fax [phone]    TRANSTHORACIC ECHOCARDIOGRAM REPORT      Patient Name:      OSKAR Holm Physician:    [id number] Magdi Maurer MD  Study Date:        10/10/2023           Ordering Provider:    [id number] COSMO SALAZAR  MRN/PID:           [id number]             Fellow:  Accession#:        [id number]         Nurse:                Rossana DE LOS SANTOSN  Date of  Birth/Age: 1955 / 68 years Sonographer:          Dayami Santo RDCS  Gender:            F                    Additional Staff:  Height:            172.72 cm            Admit Date:  Weight:            90.72 kg             Admission Status:     Outpatient  BSA:               2.04 m2              Encounter#:           [id number]  Department Location:  [facility] Non  Invasive  Blood Pressure: 108 /71 mmHg    Study Type:    TRANSTHORACIC ECHO (TTE) COMPLETE  Diagnosis/ICD: Cerebral infarction due to thrombosis of right middle cerebral  artery-I63.311  Indication:    Cerebrovascular Accident  CPT Code:      Echo Complete w Full Doppler-13109    Patient History:  Pertinent History: CVA, thrombosis right middle cerebral artery; HTN; atypical  chest pain; HLD; ASHD, s/p angiography.    Study Detail: The following Echo studies were performed: 2D, M-Mode, Doppler and  color flow. Definity used as a contrast agent for endocardial  border definition and agitated saline used as a contrast agent for  intraseptal flow evaluation. Total contrast used for this  procedure was 4.0 mL via IV push.      PHYSICIAN INTERPRETATION:  Left Ventricle: The left ventricular systolic function is normal, with an estimated ejection fraction of 65%. There are no regional wall motion abnormalities. The left ventricular cavity size is normal. Spectral Doppler shows an impaired relaxation pattern of left ventricular diastolic filling.  Left Atrium: The left atrium is normal in size. A bubble study using agitated saline was performed. Bubble study is negative. There is evidence of an atrial septal aneurysm.  Right Ventricle: The right ventricle is normal in size. There is normal right ventricular global systolic function.  Right Atrium: The right atrium is normal in size.  Aortic Valve: The aortic valve is trileaflet. There is mild aortic valve cusp calcification. There is mild aortic valve regurgitation. The peak instantaneous gradient of the  aortic valve is 13.9 mmHg.  Mitral Valve: The mitral valve is mildly thickened. There is mild mitral annular calcification. There is no evidence of mitral valve regurgitation.  Tricuspid Valve: The tricuspid valve is structurally normal. There is trace tricuspid regurgitation.  Pulmonic Valve: The pulmonic valve is not well visualized. There is physiologic pulmonic valve regurgitation.  Pericardium: There is a trivial pericardial effusion.  Aorta: The aortic root was not well visualized.  In comparison to the previous echocardiogram(s): Compared with study from 2/27/2023, no significant changea bubble study was performed on the current examination and within the limits of a technically challenging study it appears to be negative.      CONCLUSIONS:  1. Poorly visualized anatomical structures due to suboptimal image quality.  2. Left ventricular systolic function is normal with a 65% estimated ejection fraction.  3. Spectral Doppler shows an impaired relaxation pattern of left ventricular diastolic filling.  4. Mild aortic valve regurgitation.  5. Atrial septal aneurysm present.  6. Compared with study from 2/27/2023, no significant changea bubble study was performed on the current examination and within the limits of a technically challenging study it appears to be negative.    QUANTITATIVE DATA SUMMARY:  2D MEASUREMENTS:  Normal Ranges:  LAs:           2.83 cm   (2.7-4.0cm)  RVIDd:         2.65 cm   (0.9-3.6cm)  IVSd:          0.81 cm   (0.6-1.1cm)  LVPWd:         0.95 cm   (0.6-1.1cm)  LVIDd:         3.80 cm   (3.9-5.9cm)  LVIDs:         2.50 cm  LV Mass Index: 48.0 g/m2  LV % FS        34.3 %    LA VOLUME:  Normal Ranges:  LA Vol A4C:        36.5 ml    (22+/-6mL/m2)  LA Vol A2C:        42.0 ml  LA Vol BP:         39.5 ml  LA Vol Index A4C:  17.8ml/m2  LA Vol Index A2C:  20.5 ml/m2  LA Vol Index BP:   19.3 ml/m2  LA Area A4C:       14.2 cm2  LA Area A2C:       15.4 cm2  LA Major Axis A4C: 4.7 cm  LA Major Axis A2C:  4.8 cm  LA Volume Index:   19.2 ml/m2    RA VOLUME BY A/L METHOD:  Normal Ranges:  RA Vol A4C:        35.2 ml    (8.3-19.5ml)  RA Vol Index A4C:  17.2 ml/m2  RA Area A4C:       13.8 cm2  RA Major Axis A4C: 4.6 cm    LV SYSTOLIC FUNCTION BY 2D PLANIMETRY (MOD):  Normal Ranges:  EF-A4C View: 54.3 % (>=55%)  EF-A2C View: 53.6 %    LV DIASTOLIC FUNCTION:  Normal Ranges:  MV Peak E:        0.52 m/s    (0.7-1.2 m/s)  MV Peak A:        1.02 m/s    (0.42-0.7 m/s)  E/A Ratio:        0.51        (1.0-2.2)  MV e'             0.08 m/s    (>8.0)  MV A Dur:         143.02 msec  E/e' Ratio:       6.55        (<8.0)  MV DT:            286 msec    (150-240 msec)  PulmV Sys Christo:    52.31 cm/s  PulmV Sheffield Christo:   34.43 cm/s  PulmV S/D Christo:    1.52  PulmV A Revs Christo: 35.35 cm/s  PulmV A Revs Dur: 122.26 msec    MITRAL VALVE:  Normal Ranges:  MV DT:      286 msec (150-240msec)  MV PHT:     62 msec  (30-60msec)  MVA by PHT: 3.55 cm2 (4-6cm2)    AORTIC VALVE:  Normal Ranges:  AoV Vmax:      1.86 m/s  (<=1.7m/s)  AoV Peak P.9 mmHg (<20mmHg)  LVOT Max Christo:  1.27 m/s  (<=1.1m/s)  LVOT VTI:      24.23 cm  LVOT Diameter: 1.85 cm   (1.8-2.4cm)  AoV Area,Vmax: 1.85 cm2  (2.5-4.5cm2)    AORTIC INSUFFICIENCY:  AI Vmax:       3.88 m/s  AI Half-time:  653 msec  AI Decel Time: 2250 msec  AI Decel Rate: 172.25 cm/s2      RIGHT VENTRICLE:  RV Basal 3.80 cm  RV Mid   2.60 cm  RV Major 6.9 cm  TAPSE:   20.0 mm  RV s'    0.15 m/s    TRICUSPID VALVE/RVSP:  Normal Ranges:  Peak TR Velocity: 2.27 m/s  RV Syst Pressure: 23.6 mmHg (< 30mmHg)  IVC Diam:         1.30 cm    PULMONIC VALVE:  Normal Ranges:  PV Accel Time: 92 msec  (>120ms)  PV Max Christo:    1.1 m/s  (0.6-0.9m/s)  PV Max P.7 mmHg    Pulmonary Veins:  PulmV A Revs Dur: 122.26 msec  PulmV A Revs Christo: 35.35 cm/s  PulmV Sheffield Christo:   34.43 cm/s  PulmV S/D Christo:    1.52  PulmV Sys Christo:    52.31 cm/s    AORTA:  Asc Ao Diam 2.21 cm      [id number] Magdi Maurer MD  Electronically signed on 10/10/2023  at 11:08:52 AM        ** Final **      No results found for this or any previous visit.   Interpretation of neuroimaging  No results found for this or any previous visit.    Assessment/Plan   Stroke Subtype:   Ischemic stroke, likely small vessel disease, vs less likely cardioembolic   1. Cerebrovascular accident (CVA) due to occlusion of small artery (CMS/HCC)      Orders Placed This Encounter   Procedures    Platelet Aggregation     Antithrombic Therapy/Blood Thinners : Recommended to prevent a stroke or other vascular event  Plan continue aspirin and plavix for now, will check platelet function to ensure plavix is working, consider P2Y12 resistence testing   Blood Pressure : Goal is less than 130/80 mmHg  Cholesterol : LDL - Cholesterol < 70 mg/dl   Blood Sugar : Goal is fasting sugar  mg/dl, after eating less than 180 mg/dl; HbA1c less than 7%  Physical Activity : Goal is to improve physical fitness by exercising at a moderate level for at least 30 minutes most days of the week:  Stroke Warning Signs : Know the warning signs of stroke and the importance of calling 911 to get EMR treatment quickly:      R corona radiata punctate infarct, likely small vessel disease. LLE weakness which improved (but now has L knee pain). Her vascular risk factors are well controlled, and pt was already on ASA / plavix when this happened. P2Y12 testing was never done. Will check plt function test for evaluation. If resistant to plavix, will consider switching to another agent    Patient with minimal deficits. Ok to drive from our standpoint.    Return to clinic PRN     Patient seen, evaluated and discussed with attending physician, Dr. Estrada.    Moises Esposito PGY5  Vascular Neurology Fellow

## 2023-12-14 ENCOUNTER — APPOINTMENT (OUTPATIENT)
Dept: PRIMARY CARE | Facility: CLINIC | Age: 68
End: 2023-12-14
Payer: MEDICARE

## 2023-12-14 ENCOUNTER — LAB (OUTPATIENT)
Dept: LAB | Facility: LAB | Age: 68
End: 2023-12-14
Payer: MEDICARE

## 2023-12-14 DIAGNOSIS — I63.81 CEREBROVASCULAR ACCIDENT (CVA) DUE TO OCCLUSION OF SMALL ARTERY (MULTI): ICD-10-CM

## 2023-12-14 DIAGNOSIS — E83.52 HYPERCALCEMIA: ICD-10-CM

## 2023-12-15 ENCOUNTER — APPOINTMENT (OUTPATIENT)
Dept: LAB | Facility: LAB | Age: 68
End: 2023-12-15
Payer: COMMERCIAL

## 2023-12-15 LAB — HOLD SPECIMEN: NORMAL

## 2023-12-15 PROCEDURE — 36415 COLL VENOUS BLD VENIPUNCTURE: CPT

## 2023-12-21 NOTE — PROGRESS NOTES
Sukh Estrada MD    I saw and evaluated the patient. I personally obtained the key and critical portions of the history and physical exam or was physically present for key and critical portions performed by the resident/fellow. I reviewed the resident/fellow's documentation and discussed the patient with the resident/fellow. I agree with the resident/fellow's medical decision making as documented in the note.

## 2024-01-11 DIAGNOSIS — Z98.61 STATUS POST CORONARY ANGIOPLASTY: ICD-10-CM

## 2024-01-11 RX ORDER — FUROSEMIDE 20 MG/1
20 TABLET ORAL DAILY
Qty: 30 TABLET | Refills: 0 | Status: SHIPPED | OUTPATIENT
Start: 2024-01-11 | End: 2024-01-23 | Stop reason: SDUPTHER

## 2024-01-23 DIAGNOSIS — Z98.61 STATUS POST CORONARY ANGIOPLASTY: ICD-10-CM

## 2024-01-23 DIAGNOSIS — E78.5 HYPERLIPIDEMIA, UNSPECIFIED HYPERLIPIDEMIA TYPE: ICD-10-CM

## 2024-01-23 RX ORDER — FUROSEMIDE 20 MG/1
20 TABLET ORAL DAILY
Qty: 30 TABLET | Refills: 0 | Status: SHIPPED | OUTPATIENT
Start: 2024-01-23 | End: 2024-01-25 | Stop reason: SDUPTHER

## 2024-01-24 ENCOUNTER — TELEPHONE (OUTPATIENT)
Dept: CARDIOLOGY | Facility: HOSPITAL | Age: 69
End: 2024-01-24

## 2024-01-24 DIAGNOSIS — I25.10 ATHEROSCLEROTIC CARDIOVASCULAR DISEASE: ICD-10-CM

## 2024-01-24 DIAGNOSIS — E11.9 DIABETES MELLITUS TYPE 2 IN NONOBESE (MULTI): Primary | ICD-10-CM

## 2024-01-25 ENCOUNTER — OFFICE VISIT (OUTPATIENT)
Dept: PRIMARY CARE | Facility: CLINIC | Age: 69
End: 2024-01-25
Payer: MEDICARE

## 2024-01-25 VITALS
DIASTOLIC BLOOD PRESSURE: 68 MMHG | WEIGHT: 197.6 LBS | SYSTOLIC BLOOD PRESSURE: 106 MMHG | BODY MASS INDEX: 30.04 KG/M2 | HEART RATE: 76 BPM | TEMPERATURE: 96.8 F

## 2024-01-25 DIAGNOSIS — I10 PRIMARY HYPERTENSION: ICD-10-CM

## 2024-01-25 DIAGNOSIS — I25.10 ATHEROSCLEROTIC CARDIOVASCULAR DISEASE: ICD-10-CM

## 2024-01-25 DIAGNOSIS — I10 BENIGN ESSENTIAL HYPERTENSION: ICD-10-CM

## 2024-01-25 DIAGNOSIS — E78.5 HYPERLIPIDEMIA, UNSPECIFIED HYPERLIPIDEMIA TYPE: ICD-10-CM

## 2024-01-25 DIAGNOSIS — Z98.61 STATUS POST CORONARY ANGIOPLASTY: ICD-10-CM

## 2024-01-25 DIAGNOSIS — Z12.31 VISIT FOR SCREENING MAMMOGRAM: ICD-10-CM

## 2024-01-25 DIAGNOSIS — Z12.11 COLON CANCER SCREENING: ICD-10-CM

## 2024-01-25 DIAGNOSIS — E11.9 DIABETES MELLITUS TYPE 2 IN NONOBESE (MULTI): ICD-10-CM

## 2024-01-25 DIAGNOSIS — Z95.820 S/P ANGIOPLASTY WITH STENT: Primary | ICD-10-CM

## 2024-01-25 DIAGNOSIS — Z13.220 SCREENING CHOLESTEROL LEVEL: ICD-10-CM

## 2024-01-25 DIAGNOSIS — I63.311 CEREBRAL INFARCTION DUE TO THROMBOSIS OF RIGHT MIDDLE CEREBRAL ARTERY (MULTI): ICD-10-CM

## 2024-01-25 DIAGNOSIS — Z13.1 DIABETES MELLITUS SCREENING: ICD-10-CM

## 2024-01-25 DIAGNOSIS — G47.33 OSA (OBSTRUCTIVE SLEEP APNEA): ICD-10-CM

## 2024-01-25 DIAGNOSIS — Z00.00 HEALTH CARE MAINTENANCE: ICD-10-CM

## 2024-01-25 DIAGNOSIS — Z98.84 S/P GASTRIC BYPASS: ICD-10-CM

## 2024-01-25 DIAGNOSIS — Z78.0 ASYMPTOMATIC MENOPAUSE: ICD-10-CM

## 2024-01-25 DIAGNOSIS — I69.354 HEMIPARESIS OF LEFT NONDOMINANT SIDE AS LATE EFFECT OF CEREBRAL INFARCTION (MULTI): ICD-10-CM

## 2024-01-25 PROCEDURE — 3074F SYST BP LT 130 MM HG: CPT | Performed by: FAMILY MEDICINE

## 2024-01-25 PROCEDURE — 1159F MED LIST DOCD IN RCRD: CPT | Performed by: FAMILY MEDICINE

## 2024-01-25 PROCEDURE — 3078F DIAST BP <80 MM HG: CPT | Performed by: FAMILY MEDICINE

## 2024-01-25 PROCEDURE — 1036F TOBACCO NON-USER: CPT | Performed by: FAMILY MEDICINE

## 2024-01-25 PROCEDURE — G0439 PPPS, SUBSEQ VISIT: HCPCS | Performed by: FAMILY MEDICINE

## 2024-01-25 PROCEDURE — 3008F BODY MASS INDEX DOCD: CPT | Performed by: FAMILY MEDICINE

## 2024-01-25 PROCEDURE — 4010F ACE/ARB THERAPY RXD/TAKEN: CPT | Performed by: FAMILY MEDICINE

## 2024-01-25 PROCEDURE — 1126F AMNT PAIN NOTED NONE PRSNT: CPT | Performed by: FAMILY MEDICINE

## 2024-01-25 RX ORDER — METOPROLOL SUCCINATE 25 MG/1
12.5 TABLET, EXTENDED RELEASE ORAL DAILY
Qty: 90 TABLET | Refills: 1 | Status: SHIPPED | OUTPATIENT
Start: 2024-01-25

## 2024-01-25 RX ORDER — METFORMIN HYDROCHLORIDE 500 MG/1
500 TABLET ORAL
Qty: 180 TABLET | Refills: 1 | Status: ON HOLD | OUTPATIENT
Start: 2024-01-25 | End: 2024-05-01

## 2024-01-25 RX ORDER — ENALAPRIL MALEATE 20 MG/1
20 TABLET ORAL 2 TIMES DAILY
Qty: 180 TABLET | Refills: 1 | Status: SHIPPED | OUTPATIENT
Start: 2024-01-25

## 2024-01-25 RX ORDER — CLOPIDOGREL BISULFATE 75 MG/1
TABLET ORAL
Qty: 90 TABLET | Refills: 1 | Status: SHIPPED | OUTPATIENT
Start: 2024-01-25 | End: 2024-06-09

## 2024-01-25 RX ORDER — AMLODIPINE BESYLATE 5 MG/1
5 TABLET ORAL DAILY
Qty: 90 TABLET | Refills: 1 | Status: SHIPPED | OUTPATIENT
Start: 2024-01-25 | End: 2024-06-09

## 2024-01-25 RX ORDER — ATORVASTATIN CALCIUM 80 MG/1
80 TABLET, FILM COATED ORAL NIGHTLY
Qty: 90 TABLET | Refills: 1 | Status: SHIPPED | OUTPATIENT
Start: 2024-01-25

## 2024-01-25 RX ORDER — FUROSEMIDE 20 MG/1
20 TABLET ORAL DAILY
Qty: 90 TABLET | Refills: 1 | Status: SHIPPED | OUTPATIENT
Start: 2024-01-25 | End: 2024-06-09

## 2024-01-25 RX ORDER — ISOSORBIDE MONONITRATE 30 MG/1
30 TABLET, EXTENDED RELEASE ORAL DAILY
Qty: 90 TABLET | Refills: 1 | Status: SHIPPED | OUTPATIENT
Start: 2024-01-25 | End: 2024-03-01 | Stop reason: SDUPTHER

## 2024-01-25 ASSESSMENT — ACTIVITIES OF DAILY LIVING (ADL)
TAKING_MEDICATION: INDEPENDENT
DOING_HOUSEWORK: INDEPENDENT
GROCERY_SHOPPING: INDEPENDENT
MANAGING_FINANCES: INDEPENDENT

## 2024-01-25 ASSESSMENT — ENCOUNTER SYMPTOMS
DEPRESSION: 0
OCCASIONAL FEELINGS OF UNSTEADINESS: 0
LOSS OF SENSATION IN FEET: 0

## 2024-01-25 ASSESSMENT — PATIENT HEALTH QUESTIONNAIRE - PHQ9
2. FEELING DOWN, DEPRESSED OR HOPELESS: NOT AT ALL
SUM OF ALL RESPONSES TO PHQ9 QUESTIONS 1 AND 2: 0
1. LITTLE INTEREST OR PLEASURE IN DOING THINGS: NOT AT ALL

## 2024-01-25 ASSESSMENT — PAIN SCALES - GENERAL: PAINLEVEL: 0-NO PAIN

## 2024-01-25 NOTE — PROGRESS NOTES
Subjective   Patient ID: Rita Rubi is a 69 y.o. female who presents for Follow-up and Fall (Left knee tripped over some things received help to get up).  Fall        The patient is a 70 yo AA female with a history of adhesive capsulitis, allergic rhinitis, DM II, HTN, HLD, TYLER, s/p bariatric surgery, here for a CPE/ MWV.  -mammogram 12/6/22.  -colonoscopy done several years ago- FIT 2022- ordered.   -pap smear- no hx of abnormal pap smear and HYSTERECTOMY.   -dexa scan 10/28/2019.  -blood test due.  -Immunization: Prevnar 20 recommended this year.      A review of system was completed.  All systems were reviewed and were normal, except for the ones that are listed in the HPI.    Objective   Physical Exam  Constitutional:       Appearance: Normal appearance.   HENT:      Head: Normocephalic and atraumatic.      Right Ear: Tympanic membrane, ear canal and external ear normal.      Left Ear: Tympanic membrane, ear canal and external ear normal.      Nose: Nose normal.      Mouth/Throat:      Mouth: Mucous membranes are moist.      Pharynx: Oropharynx is clear.   Eyes:      Extraocular Movements: Extraocular movements intact.      Conjunctiva/sclera: Conjunctivae normal.      Pupils: Pupils are equal, round, and reactive to light.   Cardiovascular:      Rate and Rhythm: Normal rate and regular rhythm.      Pulses: Normal pulses.   Pulmonary:      Effort: Pulmonary effort is normal.      Breath sounds: Normal breath sounds.   Abdominal:      General: Abdomen is flat. Bowel sounds are normal.      Palpations: Abdomen is soft.   Musculoskeletal:         General: Normal range of motion.      Cervical back: Normal range of motion and neck supple.   Skin:     General: Skin is warm.   Neurological:      General: No focal deficit present.      Mental Status: She is alert and oriented to person, place, and time. Mental status is at baseline.   Psychiatric:         Mood and Affect: Mood normal.         Behavior: Behavior  normal.         Thought Content: Thought content normal.         Judgment: Judgment normal.     Assessment/Plan   Problem List Items Addressed This Visit       Primary hypertension    Relevant Medications    amLODIPine (Norvasc) 5 mg tablet    enalapril (Vasotec) 20 mg tablet    metoprolol succinate XL (Toprol-XL) 25 mg 24 hr tablet    Diabetes mellitus type 2 in nonobese (CMS/HCC)    Relevant Medications    empagliflozin (Jardiance) 10 mg    metFORMIN (Glucophage) 500 mg tablet    Other Relevant Orders    Hemoglobin A1C    Lipid Panel    Albumin , Urine Random    Comprehensive Metabolic Panel    TSH with reflex to Free T4 if abnormal    Hepatitis C Antibody    Hyperlipidemia    Relevant Medications    atorvastatin (Lipitor) 80 mg tablet    TYLER (obstructive sleep apnea)    S/P angioplasty with stent - Primary    Status post coronary angioplasty    Relevant Medications    furosemide (Lasix) 20 mg tablet    S/P gastric bypass    Cerebral infarction due to thrombosis of right middle cerebral artery (CMS/HCC)    Hemiparesis of left nondominant side as late effect of cerebral infarction (CMS/HCC)    Atherosclerotic cardiovascular disease    Relevant Medications    clopidogrel (Plavix) 75 mg tablet    empagliflozin (Jardiance) 10 mg    isosorbide mononitrate ER (Imdur) 30 mg 24 hr tablet    metoprolol succinate XL (Toprol-XL) 25 mg 24 hr tablet    Benign essential hypertension    Asymptomatic menopause    Relevant Orders    XR DEXA bone density    Colon cancer screening    Relevant Orders    Colonoscopy Screening; Average Risk Patient    Screening cholesterol level    Diabetes mellitus screening    Health care maintenance     -mammogram 12/6/22.  -colonoscopy done several years ago- FIT 2022- ordered.   -pap smear- no hx of abnormal pap smear and HYSTERECTOMY.   -dexa scan 10/28/2019.  -blood test due.  -Immunization: Prevnar 20 recommended this year.  -Vit D 1000 units every day.          Other Visit Diagnoses        Visit for screening mammogram        Relevant Orders    BI mammo bilateral screening tomosynthesis           Patient to return to office in 6 months.

## 2024-01-25 NOTE — ASSESSMENT & PLAN NOTE
-mammogram 12/6/22.  -colonoscopy done several years ago- FIT 2022- ordered.   -pap smear- no hx of abnormal pap smear and HYSTERECTOMY.   -dexa scan 10/28/2019.  -blood test due.  -Immunization: Prevnar 20 recommended this year.  -Vit D 1000 units every day.

## 2024-01-26 ENCOUNTER — LAB (OUTPATIENT)
Dept: LAB | Facility: LAB | Age: 69
End: 2024-01-26
Payer: MEDICARE

## 2024-01-26 DIAGNOSIS — E11.9 DIABETES MELLITUS TYPE 2 IN NONOBESE (MULTI): ICD-10-CM

## 2024-01-26 LAB — TSH SERPL-ACNC: 2.6 MIU/L (ref 0.44–3.98)

## 2024-01-26 PROCEDURE — 83036 HEMOGLOBIN GLYCOSYLATED A1C: CPT

## 2024-01-26 PROCEDURE — 36415 COLL VENOUS BLD VENIPUNCTURE: CPT

## 2024-01-26 PROCEDURE — 80053 COMPREHEN METABOLIC PANEL: CPT

## 2024-01-26 PROCEDURE — 84443 ASSAY THYROID STIM HORMONE: CPT

## 2024-01-26 PROCEDURE — 86803 HEPATITIS C AB TEST: CPT

## 2024-01-27 LAB
ALBUMIN SERPL BCP-MCNC: 4.1 G/DL (ref 3.4–5)
ALP SERPL-CCNC: 49 U/L (ref 33–136)
ALT SERPL W P-5'-P-CCNC: 12 U/L (ref 7–45)
ANION GAP SERPL CALC-SCNC: 15 MMOL/L (ref 10–20)
AST SERPL W P-5'-P-CCNC: 13 U/L (ref 9–39)
BILIRUB SERPL-MCNC: 0.5 MG/DL (ref 0–1.2)
BUN SERPL-MCNC: 21 MG/DL (ref 6–23)
CALCIUM SERPL-MCNC: 10.5 MG/DL (ref 8.6–10.6)
CHLORIDE SERPL-SCNC: 106 MMOL/L (ref 98–107)
CO2 SERPL-SCNC: 25 MMOL/L (ref 21–32)
CREAT SERPL-MCNC: 0.92 MG/DL (ref 0.5–1.05)
EGFRCR SERPLBLD CKD-EPI 2021: 67 ML/MIN/1.73M*2
EST. AVERAGE GLUCOSE BLD GHB EST-MCNC: 117 MG/DL
GLUCOSE SERPL-MCNC: 124 MG/DL (ref 74–99)
HBA1C MFR BLD: 5.7 %
HCV AB SER QL: NONREACTIVE
POTASSIUM SERPL-SCNC: 3.8 MMOL/L (ref 3.5–5.3)
PROT SERPL-MCNC: 7.2 G/DL (ref 6.4–8.2)
SODIUM SERPL-SCNC: 142 MMOL/L (ref 136–145)

## 2024-01-29 ENCOUNTER — LAB (OUTPATIENT)
Dept: LAB | Facility: LAB | Age: 69
End: 2024-01-29
Payer: MEDICARE

## 2024-01-29 DIAGNOSIS — E11.9 DIABETES MELLITUS TYPE 2 IN NONOBESE (MULTI): ICD-10-CM

## 2024-01-29 LAB
CHOLEST SERPL-MCNC: 150 MG/DL (ref 0–199)
CHOLESTEROL/HDL RATIO: 2.5
CREAT UR-MCNC: 77.9 MG/DL (ref 20–320)
HDLC SERPL-MCNC: 59.8 MG/DL
LDLC SERPL CALC-MCNC: 80 MG/DL
MICROALBUMIN UR-MCNC: 12.7 MG/L
MICROALBUMIN/CREAT UR: 16.3 UG/MG CREAT
NON HDL CHOLESTEROL: 90 MG/DL (ref 0–149)
TRIGL SERPL-MCNC: 53 MG/DL (ref 0–149)
VLDL: 11 MG/DL (ref 0–40)

## 2024-01-29 PROCEDURE — 82570 ASSAY OF URINE CREATININE: CPT

## 2024-01-29 PROCEDURE — 36415 COLL VENOUS BLD VENIPUNCTURE: CPT

## 2024-01-29 PROCEDURE — 80061 LIPID PANEL: CPT

## 2024-01-29 PROCEDURE — 82043 UR ALBUMIN QUANTITATIVE: CPT

## 2024-02-01 ENCOUNTER — HOSPITAL ENCOUNTER (OUTPATIENT)
Dept: RADIOLOGY | Facility: CLINIC | Age: 69
Discharge: HOME | End: 2024-02-01
Payer: MEDICARE

## 2024-02-01 VITALS — WEIGHT: 197 LBS | BODY MASS INDEX: 29.86 KG/M2 | HEIGHT: 68 IN

## 2024-02-01 DIAGNOSIS — Z12.31 VISIT FOR SCREENING MAMMOGRAM: ICD-10-CM

## 2024-02-01 PROCEDURE — 77063 BREAST TOMOSYNTHESIS BI: CPT | Performed by: RADIOLOGY

## 2024-02-01 PROCEDURE — 77063 BREAST TOMOSYNTHESIS BI: CPT

## 2024-02-01 PROCEDURE — 77067 SCR MAMMO BI INCL CAD: CPT | Performed by: RADIOLOGY

## 2024-02-06 DIAGNOSIS — R92.8 ABNORMAL MAMMOGRAM OF RIGHT BREAST: ICD-10-CM

## 2024-02-06 DIAGNOSIS — N63.10 MASS OF RIGHT BREAST, UNSPECIFIED QUADRANT: Primary | ICD-10-CM

## 2024-02-08 ENCOUNTER — HOSPITAL ENCOUNTER (OUTPATIENT)
Dept: RADIOLOGY | Facility: CLINIC | Age: 69
Discharge: HOME | End: 2024-02-08
Payer: MEDICARE

## 2024-02-08 DIAGNOSIS — R92.8 ABNORMAL MAMMOGRAM OF RIGHT BREAST: ICD-10-CM

## 2024-02-08 PROCEDURE — 76642 ULTRASOUND BREAST LIMITED: CPT | Mod: RIGHT SIDE | Performed by: RADIOLOGY

## 2024-02-08 PROCEDURE — 76642 ULTRASOUND BREAST LIMITED: CPT | Mod: RT

## 2024-02-08 PROCEDURE — 76982 USE 1ST TARGET LESION: CPT | Mod: RT

## 2024-02-09 DIAGNOSIS — Z12.11 COLON CANCER SCREENING: ICD-10-CM

## 2024-02-09 RX ORDER — POLYETHYLENE GLYCOL 3350, SODIUM SULFATE ANHYDROUS, SODIUM BICARBONATE, SODIUM CHLORIDE, POTASSIUM CHLORIDE 236; 22.74; 6.74; 5.86; 2.97 G/4L; G/4L; G/4L; G/4L; G/4L
4000 POWDER, FOR SOLUTION ORAL ONCE
Qty: 4000 ML | Refills: 0 | Status: SHIPPED | OUTPATIENT
Start: 2024-02-09 | End: 2024-02-09

## 2024-02-11 DIAGNOSIS — R92.8 ABNORMAL MAMMOGRAM OF LEFT BREAST: ICD-10-CM

## 2024-02-11 DIAGNOSIS — R92.8 ABNORMAL MAMMOGRAM: Primary | ICD-10-CM

## 2024-02-15 RX ORDER — ASPIRIN 81 MG/1
81 TABLET ORAL DAILY
Qty: 30 TABLET | OUTPATIENT
Start: 2024-02-15

## 2024-02-15 RX ORDER — ENALAPRIL MALEATE 20 MG/1
20 TABLET ORAL DAILY
Qty: 30 TABLET | OUTPATIENT
Start: 2024-02-15

## 2024-02-15 RX ORDER — FLUTICASONE PROPIONATE 50 MCG
SPRAY, SUSPENSION (ML) NASAL
Qty: 16 ML | OUTPATIENT
Start: 2024-02-15

## 2024-02-15 RX ORDER — ASPIRIN 81 MG/1
81 TABLET ORAL DAILY
Qty: 90 TABLET | Refills: 1 | Status: SHIPPED | OUTPATIENT
Start: 2024-02-15

## 2024-02-15 RX ORDER — ATORVASTATIN CALCIUM 80 MG/1
80 TABLET, FILM COATED ORAL NIGHTLY
Qty: 30 TABLET | Refills: 0 | OUTPATIENT
Start: 2024-02-15

## 2024-02-15 RX ORDER — ENALAPRIL MALEATE 20 MG/1
20 TABLET ORAL 2 TIMES DAILY
Qty: 180 TABLET | Refills: 1 | Status: SHIPPED | OUTPATIENT
Start: 2024-02-15 | End: 2025-02-14

## 2024-02-15 RX ORDER — ATORVASTATIN CALCIUM 80 MG/1
80 TABLET, FILM COATED ORAL DAILY
Qty: 90 TABLET | Refills: 1 | Status: SHIPPED | OUTPATIENT
Start: 2024-02-15 | End: 2024-08-13

## 2024-02-15 RX ORDER — FLUTICASONE PROPIONATE 50 MCG
SPRAY, SUSPENSION (ML) NASAL
Qty: 16 G | Refills: 5 | Status: SHIPPED | OUTPATIENT
Start: 2024-02-15

## 2024-02-15 RX ORDER — EMPAGLIFLOZIN 10 MG/1
10 TABLET, FILM COATED ORAL DAILY
Qty: 30 TABLET | OUTPATIENT
Start: 2024-02-15

## 2024-02-15 NOTE — TELEPHONE ENCOUNTER
Patient given 30 day supply of medications at discharge from acute rehab.  Transitioning care back to PCP

## 2024-02-27 ENCOUNTER — TELEPHONE (OUTPATIENT)
Dept: PRIMARY CARE | Facility: CLINIC | Age: 69
End: 2024-02-27

## 2024-02-27 DIAGNOSIS — I25.10 ATHEROSCLEROTIC CARDIOVASCULAR DISEASE: ICD-10-CM

## 2024-02-27 DIAGNOSIS — E11.9 DIABETES MELLITUS TYPE 2 IN NONOBESE (MULTI): ICD-10-CM

## 2024-02-27 NOTE — TELEPHONE ENCOUNTER
Patient called in stating that she wasn't aware that there was a change in how she takes her Medication Jardiance patient been using this medication twice a day instead of once and isn't due for another refill till March 15 and patient wants to make sure this is ok to go without this medication for this long due to not having anymore left.

## 2024-02-28 NOTE — TELEPHONE ENCOUNTER
The prescription was refilled today.  If it is not approved before the due date, the patient should be okay to go without that medication.  Her blood sugar is fairly well-controlled.  She should just monitor it and make sure it does not increase.  If it does increase, she can increase her metformin to 2 tablets in the morning and 2 tablets in the evening while waiting for the new prescription to be ready.

## 2024-03-01 ENCOUNTER — OFFICE VISIT (OUTPATIENT)
Dept: CARDIOLOGY | Facility: HOSPITAL | Age: 69
End: 2024-03-01
Payer: MEDICARE

## 2024-03-01 VITALS
OXYGEN SATURATION: 98 % | HEART RATE: 69 BPM | SYSTOLIC BLOOD PRESSURE: 121 MMHG | DIASTOLIC BLOOD PRESSURE: 63 MMHG | WEIGHT: 201 LBS | BODY MASS INDEX: 30.56 KG/M2

## 2024-03-01 DIAGNOSIS — I25.118 CORONARY ARTERY DISEASE INVOLVING NATIVE CORONARY ARTERY WITH OTHER FORM OF ANGINA PECTORIS, UNSPECIFIED WHETHER NATIVE OR TRANSPLANTED HEART (CMS-HCC): Primary | ICD-10-CM

## 2024-03-01 DIAGNOSIS — I20.89 OTHER FORMS OF ANGINA PECTORIS (CMS-HCC): ICD-10-CM

## 2024-03-01 DIAGNOSIS — I25.10 ATHEROSCLEROTIC CARDIOVASCULAR DISEASE: ICD-10-CM

## 2024-03-01 PROCEDURE — 4010F ACE/ARB THERAPY RXD/TAKEN: CPT | Performed by: INTERNAL MEDICINE

## 2024-03-01 PROCEDURE — 3061F NEG MICROALBUMINURIA REV: CPT | Performed by: INTERNAL MEDICINE

## 2024-03-01 PROCEDURE — 1159F MED LIST DOCD IN RCRD: CPT | Performed by: INTERNAL MEDICINE

## 2024-03-01 PROCEDURE — 3048F LDL-C <100 MG/DL: CPT | Performed by: INTERNAL MEDICINE

## 2024-03-01 PROCEDURE — 3074F SYST BP LT 130 MM HG: CPT | Performed by: INTERNAL MEDICINE

## 2024-03-01 PROCEDURE — 3044F HG A1C LEVEL LT 7.0%: CPT | Performed by: INTERNAL MEDICINE

## 2024-03-01 PROCEDURE — 1036F TOBACCO NON-USER: CPT | Performed by: INTERNAL MEDICINE

## 2024-03-01 PROCEDURE — 3008F BODY MASS INDEX DOCD: CPT | Performed by: INTERNAL MEDICINE

## 2024-03-01 PROCEDURE — 1126F AMNT PAIN NOTED NONE PRSNT: CPT | Performed by: INTERNAL MEDICINE

## 2024-03-01 PROCEDURE — 99213 OFFICE O/P EST LOW 20 MIN: CPT | Performed by: INTERNAL MEDICINE

## 2024-03-01 PROCEDURE — 3078F DIAST BP <80 MM HG: CPT | Performed by: INTERNAL MEDICINE

## 2024-03-01 RX ORDER — ISOSORBIDE MONONITRATE 30 MG/1
60 TABLET, EXTENDED RELEASE ORAL DAILY
Qty: 90 TABLET | Refills: 1 | Status: SHIPPED | OUTPATIENT
Start: 2024-03-01 | End: 2024-05-17 | Stop reason: SDUPTHER

## 2024-03-01 ASSESSMENT — PAIN SCALES - GENERAL: PAINLEVEL: 0-NO PAIN

## 2024-03-01 NOTE — PROGRESS NOTES
Chief Complaint:   Follow-up    History Of Present Illness:    Rita Rubi is a 69 y.o. female presenting for follow up.    Rita Rubi is a 68 y.o. female presenting with ASCVD risk factors of hypertension, dyslipidemia, DM II on OHA and family history of premature CAD ( CABG in mother in her 40s and sudden death at age 50). Additionally she has a BMI of 33 after bariatric surgery procedure. She had chest pain and a negative nuclear stress test. Due to a high pre-test probability for occlusive coronary artery disease despite negative stress test, coronary angiography was done showing occlusive CAD. This was treated with PCI March 2023. The angina symptoms resolved. She has no cost concerns with respect to medications including DAPT and high intensity statins    Sept 9 2023 she presented with clinical features of a stroke. Confirmed on Brain MRI at right corona radiata. She has left hemiparesis and speech impediment ( stuttering) is more pronounced.    At last visit BP control was suboptimal and amlodipine was started. He BP is now at control. However she now reports central chest pressure with exertion that is relieved at rest     TESTING  -Lipid panel Aug 2023- LDL 63, HDl 46,  No dyslipidemia  -TTE Oct 2023- LVEF 65%, No RWMA, impaired relaxation, mild AR, no other valve abnormalities. No interatrial shunt.  -Brain MRI Sept 2023â€“ punctate diffusion abnormality in the right corona radiata. No intracranial hemorrhage. No significant vascular abnormalities  - CTA HEAD Sept 2023 - No occlusive extra-cranial or intracranial vascular disease  - Hgb A1C 6.8  - Holter Oct 2023 - No atrial arrhythmias     PAST MEDICAL HISTORY  CAD - 80% mid LCx stenosis, 90% distal RCA stenosis, 50% proximal LAD stenosis. The LAD ( RFR 0.92, FFR 0.90) therefore PCI of mid Left circumflex and distal RCA were performed 03/27/2023 via right radial access.  Obesity  DM II on OHA  Dyslipidemia  Bariatric surgery          Last Recorded  Vitals:  Vitals:    03/01/24 1347   BP: 121/63   Pulse: 69   SpO2: 98%   Weight: 91.2 kg (201 lb)       Past Medical History:  She has a past medical history of Disorder of the skin and subcutaneous tissue, unspecified (03/09/2021) and Gastro-esophageal reflux disease with esophagitis, without bleeding (12/18/2013).    Past Surgical History:  She has a past surgical history that includes Total abdominal hysterectomy (09/05/2013); Cataract extraction (03/14/2017); Cataract extraction (01/02/2014); Gastric restriction surgery (08/14/2015); MR angio head wo IV contrast (09/10/2023); MR angio neck wo IV contrast (09/10/2023); and Breast biopsy (Left).      Social History:  She reports that she has never smoked. She has never been exposed to tobacco smoke. She has never used smokeless tobacco. She reports that she does not currently use alcohol. She reports that she does not use drugs.    Family History:  Family History   Problem Relation Name Age of Onset    Heart attack Mother      Other (esophagus cancer) Father          Allergies:  House dust, Loratadine, Other, and Pollen extracts    Outpatient Medications:  Current Outpatient Medications   Medication Instructions    amLODIPine (NORVASC) 5 mg, oral, Daily    aspirin 81 mg, oral, Daily    atorvastatin (LIPITOR) 80 mg, oral, Nightly    atorvastatin (LIPITOR) 80 mg, oral, Daily    clopidogrel (Plavix) 75 mg tablet Take 1 per day    docusate sodium (COLACE) 100 mg, oral, 2 times daily PRN    empagliflozin (JARDIANCE) 10 mg, oral, Daily    enalapril (VASOTEC) 20 mg, oral, 2 times daily    enalapril (VASOTEC) 20 mg, oral, 2 times daily    fluticasone (Flonase) 50 mcg/actuation nasal spray 1-2 sprays QD    FreeStyle Test strip 1 strip, subdermal, Daily    furosemide (LASIX) 20 mg, oral, Daily    isosorbide mononitrate ER (IMDUR) 30 mg, oral, Daily, As directed    loratadine (Claritin) 10 mg tablet 1 tablet, oral, Daily PRN    metFORMIN (GLUCOPHAGE) 500 mg, oral, 2 times  daily with meals    metoprolol succinate XL (TOPROL-XL) 12.5 mg, oral, Daily    mv,calcium,min/iron/folic/vitK (OPTISOURCE ORAL) oral    nitroglycerin (Nitrostat) 0.4 mg SL tablet 1 tablet, sublingual, Every 5 min PRN    ONETOUCH ULTRASOFT LANCETS MISC miscellaneous, Use as directed    triamcinolone (Nasacort) 55 mcg nasal inhaler nasal, Use as directed.<BR>    VITAMIN E ACETATE ORAL oral       Physical Exam:  Physical Exam  HENT:      Head: Normocephalic.   Cardiovascular:      Rate and Rhythm: Normal rate and regular rhythm.      Heart sounds: No murmur heard.  Pulmonary:      Effort: Pulmonary effort is normal. No respiratory distress.   Musculoskeletal:         General: No swelling.   Skin:     General: Skin is warm.   Neurological:      General: No focal deficit present.      Mental Status: She is alert.           Last Labs:  CBC -  Lab Results   Component Value Date    WBC 7.9 10/10/2023    HGB 11.2 (L) 10/10/2023    HCT 33.3 (L) 10/10/2023    MCV 83 10/10/2023     10/10/2023       CMP -  Lab Results   Component Value Date    CALCIUM 10.5 01/26/2024    PROT 7.2 01/26/2024    ALBUMIN 4.1 01/26/2024    AST 13 01/26/2024    ALT 12 01/26/2024    ALKPHOS 49 01/26/2024    BILITOT 0.5 01/26/2024       LIPID PANEL -   Lab Results   Component Value Date    CHOL 150 01/29/2024    TRIG 53 01/29/2024    HDL 59.8 01/29/2024    CHHDL 2.5 01/29/2024    LDLF 63 08/11/2023    VLDL 11 01/29/2024    NHDL 90 01/29/2024       RENAL FUNCTION PANEL -   Lab Results   Component Value Date    GLUCOSE 124 (H) 01/26/2024     01/26/2024    K 3.8 01/26/2024     01/26/2024    CO2 25 01/26/2024    ANIONGAP 15 01/26/2024    BUN 21 01/26/2024    CREATININE 0.92 01/26/2024    GFRMALE CANCELED 09/15/2023    CALCIUM 10.5 01/26/2024    ALBUMIN 4.1 01/26/2024        Lab Results   Component Value Date    HGBA1C 5.7 (H) 01/26/2024       ASSESSMENT AND PLAN  Chest pain  Hypertension  Coronary artery disease    Ms hartman is a 69 year  old female with known CAD ASCVD risk factors outlined above, CVA while on DAPT and high intensity statin without evidence of intracardiac shunt of atrial fibrillation.  She underwent percutaneous revascularization of left circumflex and right coronary artery. Mid LAD lesion    She now reports chest pressure with exertion that is relieved at rest. Given these she will be worked up with stress cardiac MRI.  Her Imdur was increased to 60mg every day.    Follow up with results of testing.        Armond Leyva MD

## 2024-03-08 ENCOUNTER — APPOINTMENT (OUTPATIENT)
Dept: CARDIOLOGY | Facility: HOSPITAL | Age: 69
End: 2024-03-08
Payer: MEDICARE

## 2024-03-25 ENCOUNTER — OFFICE VISIT (OUTPATIENT)
Dept: GASTROENTEROLOGY | Facility: CLINIC | Age: 69
End: 2024-03-25
Payer: MEDICARE

## 2024-03-25 VITALS — HEIGHT: 68 IN | WEIGHT: 200 LBS | BODY MASS INDEX: 30.31 KG/M2

## 2024-03-25 DIAGNOSIS — Z12.11 COLON CANCER SCREENING: Primary | ICD-10-CM

## 2024-03-25 PROCEDURE — 3048F LDL-C <100 MG/DL: CPT

## 2024-03-25 PROCEDURE — 1159F MED LIST DOCD IN RCRD: CPT

## 2024-03-25 PROCEDURE — 3044F HG A1C LEVEL LT 7.0%: CPT

## 2024-03-25 PROCEDURE — 3008F BODY MASS INDEX DOCD: CPT

## 2024-03-25 PROCEDURE — 4010F ACE/ARB THERAPY RXD/TAKEN: CPT

## 2024-03-25 PROCEDURE — 99203 OFFICE O/P NEW LOW 30 MIN: CPT

## 2024-03-25 PROCEDURE — 3061F NEG MICROALBUMINURIA REV: CPT

## 2024-03-25 PROCEDURE — 1036F TOBACCO NON-USER: CPT

## 2024-03-25 ASSESSMENT — ENCOUNTER SYMPTOMS
DIARRHEA: 0
TROUBLE SWALLOWING: 0
COUGH: 0
CHILLS: 0
FEVER: 0
ABDOMINAL DISTENTION: 0
RECTAL PAIN: 0
FATIGUE: 0
VOMITING: 0
SHORTNESS OF BREATH: 0
ANAL BLEEDING: 0
CONSTIPATION: 0
BLOOD IN STOOL: 0
NAUSEA: 0
ABDOMINAL PAIN: 0
APPETITE CHANGE: 0

## 2024-03-25 NOTE — PATIENT INSTRUCTIONS
Your colonoscopy has been scheduled on 4/8/2024. You will need a ride since this involves sedation.  I will send a bowel prep to your pharmacy.  Clear liquid diet the day before the procedure. Start the 1st part of the prep at 6 pm the night prior and the other half 5 hrs before the procedure.  Your prescribing doctor would like you to have this done while on plavix so please do not hold this medication

## 2024-03-25 NOTE — ASSESSMENT & PLAN NOTE
Colonoscopy scheduled  Spoke with prescribing physician and Plavix not to be held during this   GoLytely already prescribed

## 2024-03-25 NOTE — PROGRESS NOTES
Subjective     History of Present Illness:   Rita Rubi is a 69 y.o. female with PMHx of CAD s/p PCI 3/2023 on aspirin and Plavix, HTN, GERD, AV insufficiency, DM2, ischemic stroke, TYLER who presents to GI clinic for further evaluation colonoscopy    Today, patient here to discuss Plavix and colonoscopy.  Patient has colonoscopy scheduled for 4/8/2024.  She denies any GI complaints today.  Denies constipation, diarrhea, dyspepsia, melena, hematochezia, dysphagia, unintentional weight loss    Denies ETOH, smoking, marijuana  Denies fxh GI cancer: father esophageal cancer. Denies fhx IBD  Abdominal Surgeries: Gastric bypass, hysterectomy    Last colonoscopy- patient reports was in 2015 and was normal at [place]  Last EGD reports was around 2015    10/2023 echocardiogram: Normal EF  Past Medical History  As per HPI.     Social History  she  reports that she has never smoked. She has never been exposed to tobacco smoke. She has never used smokeless tobacco. She reports that she does not currently use alcohol. She reports that she does not use drugs.     Family History  her family history includes Heart attack in her mother; esophagus cancer in her father.     Review of Systems  Review of Systems   Constitutional:  Negative for appetite change, chills, fatigue and fever.   HENT:  Negative for trouble swallowing.    Respiratory:  Negative for cough and shortness of breath.    Gastrointestinal:  Negative for abdominal distention, abdominal pain, anal bleeding, blood in stool, constipation, diarrhea, nausea, rectal pain and vomiting.       Allergies  Allergies   Allergen Reactions    House Dust Unknown     And mold - sinus congestion    Loratadine Unknown    Other Other    Pollen Extracts Itching and Hives     Sinus clog up       Medications  Current Outpatient Medications   Medication Instructions    amLODIPine (NORVASC) 5 mg, oral, Daily    aspirin 81 mg, oral, Daily    atorvastatin (LIPITOR) 80 mg, oral, Nightly     atorvastatin (LIPITOR) 80 mg, oral, Daily    clopidogrel (Plavix) 75 mg tablet Take 1 per day    docusate sodium (COLACE) 100 mg, oral, 2 times daily PRN    empagliflozin (JARDIANCE) 10 mg, oral, Daily    enalapril (VASOTEC) 20 mg, oral, 2 times daily    enalapril (VASOTEC) 20 mg, oral, 2 times daily    fluticasone (Flonase) 50 mcg/actuation nasal spray 1-2 sprays QD    FreeStyle Test strip 1 strip, subdermal, Daily    furosemide (LASIX) 20 mg, oral, Daily    isosorbide mononitrate ER (IMDUR) 60 mg, oral, Daily, As directed    loratadine (Claritin) 10 mg tablet 1 tablet, oral, Daily PRN    metFORMIN (GLUCOPHAGE) 500 mg, oral, 2 times daily with meals    metoprolol succinate XL (TOPROL-XL) 12.5 mg, oral, Daily    mv,calcium,min/iron/folic/vitK (OPTISOURCE ORAL) oral    nitroglycerin (Nitrostat) 0.4 mg SL tablet 1 tablet, sublingual, Every 5 min PRN    ONETOUCH ULTRASOFT LANCETS MISC miscellaneous, Use as directed    triamcinolone (Nasacort) 55 mcg nasal inhaler nasal, Use as directed.<BR>    VITAMIN E ACETATE ORAL oral        Objective   There were no vitals taken for this visit.   Physical Exam  Constitutional:       Appearance: Normal appearance. She is normal weight.   HENT:      Mouth/Throat:      Mouth: Mucous membranes are dry.      Pharynx: Oropharynx is clear.   Cardiovascular:      Rate and Rhythm: Normal rate and regular rhythm.   Pulmonary:      Effort: Pulmonary effort is normal.      Breath sounds: Normal breath sounds. No wheezing or rhonchi.   Abdominal:      General: Abdomen is flat. Bowel sounds are normal. There is no distension.      Palpations: Abdomen is soft. There is no hepatomegaly.      Tenderness: There is no abdominal tenderness. There is no guarding or rebound. Negative signs include Chavez's sign.      Hernia: No hernia is present.   Musculoskeletal:         General: Normal range of motion.   Skin:     General: Skin is warm and dry.   Neurological:      General: No focal deficit present.       Mental Status: She is alert and oriented to person, place, and time. Mental status is at baseline.   Psychiatric:         Mood and Affect: Mood normal.         Behavior: Behavior normal.           Lab Results   Component Value Date    WBC 7.9 10/10/2023    WBC 6.6 09/26/2023    WBC 6.2 09/19/2023    HGB 11.2 (L) 10/10/2023    HGB 8.7 (L) 09/26/2023    HGB 8.9 (L) 09/19/2023    HCT 33.3 (L) 10/10/2023    HCT 25.4 (L) 09/26/2023    HCT 25.7 (L) 09/19/2023     10/10/2023     09/26/2023     09/19/2023     Lab Results   Component Value Date     01/26/2024     10/10/2023     09/26/2023    K 3.8 01/26/2024    K 3.5 10/10/2023    K 4.4 09/26/2023     01/26/2024     10/10/2023     (H) 09/26/2023    CO2 25 01/26/2024    CO2 29 10/10/2023    CO2 25 09/26/2023    BUN 21 01/26/2024    BUN 28 (H) 10/10/2023    BUN 12 09/26/2023    CREATININE 0.92 01/26/2024    CREATININE 1.03 10/10/2023    CREATININE 0.72 09/26/2023    CALCIUM 10.5 01/26/2024    CALCIUM 11.0 (H) 10/10/2023    CALCIUM 9.3 09/26/2023    PROT 7.2 01/26/2024    PROT 7.9 10/10/2023    PROT 6.2 (L) 09/15/2023    BILITOT 0.5 01/26/2024    BILITOT 0.6 10/10/2023    BILITOT 0.6 09/15/2023    ALKPHOS 49 01/26/2024    ALKPHOS 46 10/10/2023    ALKPHOS 35 09/15/2023    ALT 12 01/26/2024    ALT 12 10/10/2023    ALT 13 09/15/2023    AST 13 01/26/2024    AST 14 10/10/2023    AST 17 09/15/2023    GLUCOSE 124 (H) 01/26/2024    GLUCOSE 94 10/10/2023    GLUCOSE 75 09/26/2023           Rita Rubi is a 69 y.o. female who presents to GI clinic for colonoscopy.    Colon cancer screening  Colonoscopy scheduled  Spoke with prescribing physician and Plavix not to be held during this   GoLytely already prescribed         Rajwinder Newman, APRN-CNP

## 2024-04-02 ENCOUNTER — APPOINTMENT (OUTPATIENT)
Dept: PRIMARY CARE | Facility: CLINIC | Age: 69
End: 2024-04-02
Payer: MEDICARE

## 2024-04-05 ENCOUNTER — HOSPITAL ENCOUNTER (OUTPATIENT)
Dept: RADIOLOGY | Facility: HOSPITAL | Age: 69
Discharge: HOME | End: 2024-04-05
Payer: MEDICARE

## 2024-04-05 VITALS — BODY MASS INDEX: 30.07 KG/M2 | WEIGHT: 198.41 LBS | HEIGHT: 68 IN

## 2024-04-05 DIAGNOSIS — I20.89 OTHER FORMS OF ANGINA PECTORIS (CMS-HCC): ICD-10-CM

## 2024-04-05 DIAGNOSIS — I25.118 CORONARY ARTERY DISEASE INVOLVING NATIVE CORONARY ARTERY WITH OTHER FORM OF ANGINA PECTORIS, UNSPECIFIED WHETHER NATIVE OR TRANSPLANTED HEART (CMS-HCC): ICD-10-CM

## 2024-04-05 PROCEDURE — 75563 CARD MRI W/STRESS IMG & DYE: CPT | Performed by: RADIOLOGY

## 2024-04-05 PROCEDURE — A9575 INJ GADOTERATE MEGLUMI 0.1ML: HCPCS | Performed by: INTERNAL MEDICINE

## 2024-04-05 PROCEDURE — 75565 CARD MRI VELOC FLOW MAPPING: CPT | Performed by: RADIOLOGY

## 2024-04-05 PROCEDURE — 2550000001 HC RX 255 CONTRASTS: Performed by: INTERNAL MEDICINE

## 2024-04-05 PROCEDURE — 75563 CARD MRI W/STRESS IMG & DYE: CPT

## 2024-04-05 RX ORDER — GADOTERATE MEGLUMINE 376.9 MG/ML
36 INJECTION INTRAVENOUS
Status: COMPLETED | OUTPATIENT
Start: 2024-04-05 | End: 2024-04-05

## 2024-04-05 RX ADMIN — GADOTERATE MEGLUMINE 36 ML: 376.9 INJECTION INTRAVENOUS at 14:04

## 2024-04-05 NOTE — NURSING NOTE
MRI STRESS        Stress protocol: Regadenoson  Regadenoson Dose: 0.4mg  Aminophylline Dose: 100mg     Resting Vitals:  BP: 172/71  HR: 77 bpm   SPO2: 98% RA   Resting ECG: Sinus, poor R wave progression, nonspecific T wave abnormality     Stress:  0.4mg IV push Regadenoson:  1 min: /73  HR 84 bpm 100% RA Symptoms: “tightness in my chest”  2 min: /75  HR 93 bpm 96% RA Symptoms: “tightness in my chest”     Reversal/Recovery:  100mg IV push Aminophylline:  1 min: /65  HR 83 bpm 100% RA Symptoms: Denies  2 min: /76  HR 76 bpm 96% RA Symptoms: Denies  4 min: /70  HR 79 bpm 96% RA Symptoms: Denies  6 min: /66  HR 77 bpm 97% RA Symptoms: Denies     Patients resting HR of 77 bpm seth to a maximum of 93 bpm.  Resting BP of 172/71 was the highest of the exam. Patients post stress EKG remained unchanged.  Patient discharged from the [facility] to home.

## 2024-04-08 ENCOUNTER — APPOINTMENT (OUTPATIENT)
Dept: GASTROENTEROLOGY | Facility: EXTERNAL LOCATION | Age: 69
End: 2024-04-08

## 2024-04-12 DIAGNOSIS — I25.10 CAD (CORONARY ARTERY DISEASE): Primary | ICD-10-CM

## 2024-04-18 ENCOUNTER — HOSPITAL ENCOUNTER (OUTPATIENT)
Dept: RADIOLOGY | Facility: CLINIC | Age: 69
Discharge: HOME | End: 2024-04-18
Payer: MEDICARE

## 2024-04-18 ENCOUNTER — APPOINTMENT (OUTPATIENT)
Dept: RADIOLOGY | Facility: CLINIC | Age: 69
End: 2024-04-18
Payer: MEDICARE

## 2024-04-18 DIAGNOSIS — Z78.0 ASYMPTOMATIC MENOPAUSE: ICD-10-CM

## 2024-04-18 PROCEDURE — 77080 DXA BONE DENSITY AXIAL: CPT

## 2024-04-18 PROCEDURE — 77080 DXA BONE DENSITY AXIAL: CPT | Performed by: RADIOLOGY

## 2024-04-24 ENCOUNTER — LAB (OUTPATIENT)
Dept: LAB | Facility: LAB | Age: 69
End: 2024-04-24
Payer: MEDICARE

## 2024-04-24 DIAGNOSIS — I25.10 CAD (CORONARY ARTERY DISEASE): ICD-10-CM

## 2024-04-24 LAB
ERYTHROCYTE [DISTWIDTH] IN BLOOD BY AUTOMATED COUNT: 16.3 % (ref 11.5–14.5)
HCT VFR BLD AUTO: 30.9 % (ref 36–46)
HGB BLD-MCNC: 10.8 G/DL (ref 12–16)
MCH RBC QN AUTO: 28.6 PG (ref 26–34)
MCHC RBC AUTO-ENTMCNC: 35 G/DL (ref 32–36)
MCV RBC AUTO: 82 FL (ref 80–100)
NRBC BLD-RTO: 0 /100 WBCS (ref 0–0)
PLATELET # BLD AUTO: 204 X10*3/UL (ref 150–450)
RBC # BLD AUTO: 3.78 X10*6/UL (ref 4–5.2)
WBC # BLD AUTO: 5.6 X10*3/UL (ref 4.4–11.3)

## 2024-04-24 PROCEDURE — 36415 COLL VENOUS BLD VENIPUNCTURE: CPT

## 2024-04-24 PROCEDURE — 80048 BASIC METABOLIC PNL TOTAL CA: CPT

## 2024-04-24 PROCEDURE — 85027 COMPLETE CBC AUTOMATED: CPT

## 2024-04-25 LAB
ANION GAP SERPL CALC-SCNC: 13 MMOL/L (ref 10–20)
BUN SERPL-MCNC: 21 MG/DL (ref 6–23)
CALCIUM SERPL-MCNC: 9.8 MG/DL (ref 8.6–10.6)
CHLORIDE SERPL-SCNC: 104 MMOL/L (ref 98–107)
CO2 SERPL-SCNC: 26 MMOL/L (ref 21–32)
CREAT SERPL-MCNC: 0.98 MG/DL (ref 0.5–1.05)
EGFRCR SERPLBLD CKD-EPI 2021: 62 ML/MIN/1.73M*2
GLUCOSE SERPL-MCNC: 129 MG/DL (ref 74–99)
POTASSIUM SERPL-SCNC: 4.3 MMOL/L (ref 3.5–5.3)
SODIUM SERPL-SCNC: 139 MMOL/L (ref 136–145)

## 2024-05-01 ENCOUNTER — TELEPHONE (OUTPATIENT)
Dept: CARDIOLOGY | Facility: HOSPITAL | Age: 69
End: 2024-05-01
Payer: MEDICARE

## 2024-05-01 ENCOUNTER — HOSPITAL ENCOUNTER (OUTPATIENT)
Facility: HOSPITAL | Age: 69
Setting detail: OUTPATIENT SURGERY
Discharge: HOME | End: 2024-05-01
Attending: INTERNAL MEDICINE | Admitting: INTERNAL MEDICINE
Payer: COMMERCIAL

## 2024-05-01 VITALS
OXYGEN SATURATION: 100 % | HEART RATE: 67 BPM | HEIGHT: 67 IN | RESPIRATION RATE: 15 BRPM | SYSTOLIC BLOOD PRESSURE: 163 MMHG | DIASTOLIC BLOOD PRESSURE: 87 MMHG | BODY MASS INDEX: 31.55 KG/M2 | WEIGHT: 201 LBS

## 2024-05-01 DIAGNOSIS — I25.10 ATHEROSCLEROTIC CARDIOVASCULAR DISEASE: ICD-10-CM

## 2024-05-01 DIAGNOSIS — R94.39 POSITIVE CARDIAC STRESS TEST: Primary | ICD-10-CM

## 2024-05-01 DIAGNOSIS — E11.9 DIABETES MELLITUS TYPE 2 IN NONOBESE (MULTI): ICD-10-CM

## 2024-05-01 DIAGNOSIS — I25.118 ATHEROSCLEROTIC HEART DISEASE OF NATIVE CORONARY ARTERY WITH OTHER FORMS OF ANGINA PECTORIS (CMS-HCC): ICD-10-CM

## 2024-05-01 PROCEDURE — 2500000001 HC RX 250 WO HCPCS SELF ADMINISTERED DRUGS (ALT 637 FOR MEDICARE OP): Performed by: INTERNAL MEDICINE

## 2024-05-01 PROCEDURE — 7100000010 HC PHASE TWO TIME - EACH INCREMENTAL 1 MINUTE: Performed by: INTERNAL MEDICINE

## 2024-05-01 PROCEDURE — 99152 MOD SED SAME PHYS/QHP 5/>YRS: CPT | Performed by: INTERNAL MEDICINE

## 2024-05-01 PROCEDURE — 2780000003 HC OR 278 NO HCPCS: Performed by: INTERNAL MEDICINE

## 2024-05-01 PROCEDURE — 96373 THER/PROPH/DIAG INJ IA: CPT | Performed by: INTERNAL MEDICINE

## 2024-05-01 PROCEDURE — 2720000007 HC OR 272 NO HCPCS: Performed by: INTERNAL MEDICINE

## 2024-05-01 PROCEDURE — C1760 CLOSURE DEV, VASC: HCPCS | Performed by: INTERNAL MEDICINE

## 2024-05-01 PROCEDURE — 99153 MOD SED SAME PHYS/QHP EA: CPT | Performed by: INTERNAL MEDICINE

## 2024-05-01 PROCEDURE — 2500000004 HC RX 250 GENERAL PHARMACY W/ HCPCS (ALT 636 FOR OP/ED): Performed by: INTERNAL MEDICINE

## 2024-05-01 PROCEDURE — 93454 CORONARY ARTERY ANGIO S&I: CPT | Performed by: INTERNAL MEDICINE

## 2024-05-01 PROCEDURE — 2550000001 HC RX 255 CONTRASTS: Performed by: INTERNAL MEDICINE

## 2024-05-01 PROCEDURE — 2500000005 HC RX 250 GENERAL PHARMACY W/O HCPCS: Performed by: INTERNAL MEDICINE

## 2024-05-01 PROCEDURE — 7100000009 HC PHASE TWO TIME - INITIAL BASE CHARGE: Performed by: INTERNAL MEDICINE

## 2024-05-01 PROCEDURE — C1894 INTRO/SHEATH, NON-LASER: HCPCS | Performed by: INTERNAL MEDICINE

## 2024-05-01 RX ORDER — HEPARIN SODIUM 1000 [USP'U]/ML
INJECTION, SOLUTION INTRAVENOUS; SUBCUTANEOUS AS NEEDED
Status: DISCONTINUED | OUTPATIENT
Start: 2024-05-01 | End: 2024-05-01 | Stop reason: HOSPADM

## 2024-05-01 RX ORDER — LIDOCAINE HYDROCHLORIDE 20 MG/ML
INJECTION, SOLUTION INFILTRATION; PERINEURAL AS NEEDED
Status: DISCONTINUED | OUTPATIENT
Start: 2024-05-01 | End: 2024-05-01 | Stop reason: HOSPADM

## 2024-05-01 RX ORDER — DIPHENHYDRAMINE HCL 25 MG
CAPSULE ORAL AS NEEDED
Status: DISCONTINUED | OUTPATIENT
Start: 2024-05-01 | End: 2024-05-01 | Stop reason: HOSPADM

## 2024-05-01 RX ORDER — MIDAZOLAM HYDROCHLORIDE 1 MG/ML
INJECTION, SOLUTION INTRAMUSCULAR; INTRAVENOUS AS NEEDED
Status: DISCONTINUED | OUTPATIENT
Start: 2024-05-01 | End: 2024-05-01 | Stop reason: HOSPADM

## 2024-05-01 RX ORDER — CYANOCOBALAMIN (VITAMIN B-12) 250 MCG
250 TABLET ORAL DAILY
COMMUNITY

## 2024-05-01 RX ORDER — CHOLECALCIFEROL (VITAMIN D3) 25 MCG
1000 TABLET ORAL DAILY
COMMUNITY

## 2024-05-01 RX ORDER — METFORMIN HYDROCHLORIDE 500 MG/1
500 TABLET ORAL
Qty: 60 TABLET | Refills: 11 | Status: SHIPPED | OUTPATIENT
Start: 2024-05-04 | End: 2024-06-09

## 2024-05-01 RX ORDER — FENTANYL CITRATE 50 UG/ML
INJECTION, SOLUTION INTRAMUSCULAR; INTRAVENOUS AS NEEDED
Status: DISCONTINUED | OUTPATIENT
Start: 2024-05-01 | End: 2024-05-01 | Stop reason: HOSPADM

## 2024-05-01 ASSESSMENT — ENCOUNTER SYMPTOMS
NAUSEA: 0
EYE PAIN: 0
DYSURIA: 0
SINUS PRESSURE: 0
FEVER: 0
BACK PAIN: 0
SHORTNESS OF BREATH: 1
CHILLS: 0
CONFUSION: 0
VOMITING: 0
DIZZINESS: 0
CHEST TIGHTNESS: 1
LIGHT-HEADEDNESS: 0

## 2024-05-01 NOTE — PROGRESS NOTES
Pharmacy Medication History Review    Rita Rubi is a 69 y.o. female admitted for No Principal Problem: There is no principal problem currently on the Problem List. Please update the Problem List and refresh.. Pharmacy reviewed the patient's [id number] medications and allergies for accuracy.    The list below reflects the updated PTA list. Comments regarding how patient may be taking medications differently can be found in the Admit Orders Activity  Prior to Admission Medications   Prescriptions Last Dose Informant   ONETOUCH ULTRASOFT LANCETS MISC  Self   Sig: Use as directed   amLODIPine (Norvasc) 5 mg tablet 2024 Self   Sig: Take 1 tablet (5 mg) by mouth once daily.   aspirin 81 mg EC tablet 2024 Self   Sig: Take 1 tablet (81 mg) by mouth once daily.   atorvastatin (Lipitor) 80 mg tablet 2024 Self   Sig: Take 1 tablet (80 mg) by mouth once daily at bedtime.   atorvastatin (Lipitor) 80 mg tablet  Self   Sig: Take 1 tablet (80 mg) by mouth once daily.   cholecalciferol (Vitamin D3) 25 MCG (1000 UT) tablet 2024 Self   Sig: Take 1 tablet (1,000 Units) by mouth once daily.   clopidogrel (Plavix) 75 mg tablet 2024 Self   Sig: Take 1 per day   cyanocobalamin (Vitamin B-12) 250 mcg tablet 2024 Self   Sig: Take 1 tablet (250 mcg) by mouth once daily.   docusate sodium (Colace) 100 mg capsule Past Month Self   Sig: Take 1 capsule (100 mg) by mouth 2 times a day as needed for constipation.   empagliflozin (Jardiance) 10 mg 2024 Self   Sig: Take 1 tablet (10 mg) by mouth once daily.   enalapril (Vasotec) 20 mg tablet 2024 Self   Sig: Take 1 tablet (20 mg) by mouth 2 times a day.   enalapril (Vasotec) 20 mg tablet  Self   Sig: Take 1 tablet (20 mg) by mouth 2 times a day.   fluticasone (Flonase) 50 mcg/actuation nasal spray Past Month Self   Si-2 sprays QD   furosemide (Lasix) 20 mg tablet 2024 Self   Sig: Take 1 tablet (20 mg) by mouth once daily.   isosorbide  mononitrate ER (Imdur) 30 mg 24 hr tablet 4/30/2024 Self   Sig: Take 2 tablets (60 mg) by mouth once daily. As directed   metFORMIN (Glucophage) 500 mg tablet 4/30/2024 Self   Sig: Take 1 tablet (500 mg) by mouth 2 times a day with meals.   metoprolol succinate XL (Toprol-XL) 25 mg 24 hr tablet 4/30/2024 Self   Sig: Take 0.5 tablets (12.5 mg) by mouth once daily.   mv,calcium,min/iron/folic/vitK (OPTISOURCE ORAL) 4/30/2024 Self   Sig: Take 1 tablet by mouth once daily.   nitroglycerin (Nitrostat) 0.4 mg SL tablet  Self   Sig: Place 1 tablet (0.4 mg) under the tongue every 5 minutes if needed for chest pain.      Facility-Administered Medications: None        The list below reflects the updated allergy list. Please review each documented allergy for additional clarification and justification.  Allergies  Reviewed by Miriam Santana PharmD on 5/1/2024        Severity Reactions Comments    House Dust Not Specified Unknown And mold - sinus congestion    Loratadine Not Specified Unknown     Other Not Specified Other     Pollen Extracts Not Specified Itching, Hives Sinus clog up            Patient was unable to be assessed for M2B at discharge. Pharmacy has been updated to [facility]. M2B service not offered prior to surgery, please reassess prior to patient discharge if Meds to Beds is desired.     Sources used to complete the med history include: Patient interview - moderate historian of medications/ Pharmacy - [facility]/ Chart review - Cardiology note 3/1/24    Miriam Santana PharmD  Transitions of Care Pharmacist  [facility]  Please reach out via Secure Chat for questions, or if no response call [facility] or [facility]

## 2024-05-01 NOTE — DISCHARGE INSTRUCTIONS
CARDIAC CATHETERIZATION DISCHARGE INSTRUCTIONS (procedure done on 5/1/24)     FOR SUDDEN AND SEVERE CHEST PAIN, SHORTNESS OF BREATH, EXCESSIVE BLEEDING, SIGNS OF STROKE, OR CHANGES IN MENTAL STATUS YOU SHOULD CALL 911 IMMEDIATELY.     If your provider has prescribed aspirin and/or clopidogrel (Plavix), or prasugrel (Effient), or ticagrelor (Brilinta), DO NOT STOP THESE MEDICATIONS for any reason without talking to your cardiologist first. If any of these were prescribed, you must take them every day without missing a single dose. If you are getting low on these medications, contact your provider immediately for a refill.     FOR NEXT 24 HOURS  - Upon discharge, you should return home and rest for the remainder of the day and evening. You do not have to stay on bed rest but should not be very active.  It is recommended a responsible adult be with you for the first 24 hours after the procedure.    - No driving for 24 hours after procedure. Please arrange for someone to drive you home from the hospital today.     - Do not drive, operate machinery, or use power tools for 24 hours after your procedure.     - Do not make any legal decisions for 24 hours after your procedure.     - Do not drink alcoholic beverages for 24 hours after your procedure.    WOUND CARE   *FOR FEMORAL (LEG) ACCESS*  ·      Avoid heavy lifting (over 10 pounds) for 7 days, squatting or excessive bending for 2 days, and strenuous exercise for 7 days.  ·      No submerged bathing, swimming, or hot tubs for the next 7 days, or until fully healed.  ·      Avoid sexual activity for 3-4 days until any groin discomfort has ceased.     *FOR RADIAL (WRIST) ACCESS*  ·      No lifting more than 5 pounds or excessive use of the wrist for 24 hours - for example, treat your wrist as if it is sprained.  ·      Do not engage in vigorous activities (tennis, golf, bowling, weights) for at least 48 hours after the procedure.  ·      Do not submerge the wrist  for 7 days after the procedure.  ·      You should expect mild tingling in your hand and tenderness at the puncture site for up to 3 days.    - The transparent dressing should be removed from the site 24 hours after the procedure.  Wash the site gently with soap and water. Rinse well and pat dry. Keep the area clean and dry. You may apply a Band-Aid to the site. Avoid lotions, ointments, or powders until fully healed.     - You may shower the day after your procedure.      - It is normal to notice a small bruise around the puncture site and/or a small grape sized or smaller lump. Any large bruising or large lump warrants a call to the office.     - If bleeding should occur, lay down and apply pressure to the affected area for 10 minutes.  If the bleeding stops notify your physician.  If there is a large amount of bleeding or spurting of blood CALL 911 immediately.  DO NOT drive yourself to the hospital.    - You may experience some tenderness, bruising or minimal inflammation.  If you have any concerns, you may contact the Cath Lab or if any of these symptoms become excessive, contact your cardiologist or go to the emergency room.     OTHER INSTRUCTIONS  - You may take acetaminophen (Tylenol) as directed for discomfort.  If pain is not relieved with acetaminophen (Tylenol), contact your doctor.    - If you notice or experience any of the following, you should notify your doctor or seek medical attention  Chest pain or discomfort  Change in mental status or weakness in extremities.  Dizziness, light headedness, or feeling faint.  Change in the site where the procedure was performed, such as bleeding or an increased area of bruising or swelling.  Tingling, numbness, pain, or coolness in the leg/arm beyond the site where the procedure was performed.  Signs of infection (i.e. shaking chills, temperature > 100 degrees Fahrenheit, warmth, redness) in the leg/arm area where the procedure was performed.  Changes in  urination   Bloody or black stools  Vomiting blood  Severe nose bleeds  Any excessive bleeding    - If you DO NOT have an appointment with your cardiologist within 2-4 weeks following your procedure, please contact their office.

## 2024-05-01 NOTE — H&P
History Of Present Illness  Rita Rubi is a 69 y.o. female with PMH HTN, DLD, DM II, known CAD with prior PCI to Lcx and RCA 3/2023, CVA 9/2023 on aspirin and plavix who presents for [facility] today.     Patient reports progressively worsening activity intolerance. Previously she was able to hike 3 miles in 1.5 hours but now after 10 minutes she has to take it slow and is unable to complete her hike. She endorses [place] and chest pain with exertion/ walking distances or while doing stairs. This pain resolved with rest. Denies associated N/V, LH/dizziness, or diaphoresis. Reports taking SL nitroglycerin a few times per week every week since March of this year. Reports running out of SL nitro for the last week and has been unable to take any. At rest denies any symptoms. Denies orthopnea.      Past Medical History  Past Medical History:   Diagnosis Date    Disorder of the skin and subcutaneous tissue, unspecified 03/09/2021    Skin lesion    Gastro-esophageal reflux disease with esophagitis, without bleeding 12/18/2013    Gastro-esophageal reflux disease with esophagitis       Surgical History  Past Surgical History:   Procedure Laterality Date    BREAST BIOPSY Left     Benign    CATARACT EXTRACTION  03/14/2017    Cataract Surgery    CATARACT EXTRACTION  01/02/2014    Cataract Surgery    GASTRIC RESTRICTION SURGERY  08/14/2015    Gastric Surgery For Morbid Obesity    MR HEAD ANGIO WO IV CONTRAST  09/10/2023    MR HEAD ANGIO WO IV CONTRAST 9/10/2023 [facility]    MR NECK ANGIO WO IV CONTRAST  09/10/2023    MR NECK ANGIO WO IV CONTRAST 9/10/2023 [facility] MRI    TOTAL ABDOMINAL HYSTERECTOMY  09/05/2013    Total Abdominal Hysterectomy        Social History  She reports that she has never smoked. She has never been exposed to tobacco smoke. She has never used smokeless tobacco. She reports that she does not currently use alcohol. She reports that she does not use drugs.    Family History  Family History   Problem Relation Name Age of  "Onset    Heart attack Mother      Other (esophagus cancer) Father          Allergies  House dust, Loratadine, Other, and Pollen extracts    Review of Systems   Constitutional:  Negative for chills and fever.   HENT:  Negative for ear pain and sinus pressure.    Eyes:  Negative for pain.   Respiratory:  Positive for chest tightness and shortness of breath.    Cardiovascular:  Positive for chest pain and leg swelling.   Gastrointestinal:  Negative for nausea and vomiting.   Genitourinary:  Negative for dysuria.   Musculoskeletal:  Negative for back pain.   Skin:  Negative for rash.   Neurological:  Negative for dizziness and light-headedness.   Psychiatric/Behavioral:  Negative for confusion.         Physical Exam  Constitutional:       Appearance: Normal appearance.   HENT:      Mouth/Throat:      Mouth: Mucous membranes are moist.   Cardiovascular:      Rate and Rhythm: Regular rhythm. Tachycardia present.      Heart sounds: Murmur heard.   Pulmonary:      Breath sounds: Wheezing present.   Abdominal:      General: Abdomen is flat.      Palpations: Abdomen is soft.   Musculoskeletal:      Right lower leg: Edema (trace) present.      Left lower leg: Edema (trace) present.   Skin:     General: Skin is warm.   Neurological:      Mental Status: She is alert. Mental status is at baseline.      Cranial Nerves: No facial asymmetry.      Motor: No weakness.      Comments: Stuttering present   Psychiatric:         Mood and Affect: Mood normal.         Behavior: Behavior normal.          Last Recorded Vitals  Height 1.702 m (5' 7\"), weight 91.2 kg (201 lb).    Relevant Results        No results found for this or any previous visit (from the past 24 hour(s)).       Assessment/Plan   Active Problems:  There are no active Hospital Problems.    Rita Rubi is a 69 y.o. female with PMH HTN, DLD, DM II, known CAD with prior PCI to Lcx and RCA 3/2023, CVA 9/2023 on aspirin and plavix who presents for [facility] today.     [facility] today with Dr" Ukaigwe  - already on aspirin and plavix at home for CVA and prior PCI       I spent 30 minutes in the professional and overall care of this patient.      Diana Briscoe, APRN-CNP

## 2024-05-01 NOTE — TELEPHONE ENCOUNTER
Once patient had been discharged, decision made to increase imdur dosing for hypertension, SBP noted 150-160s during the procedure. Called to discuss with patient about increasing dosing. Patient noted her BP was high here due to being nervous for the procedure. Patient stated her SBPs at home are usually between 110-120s. Instructed patient to continue to record blood pressures at home and bring them to next cardiology appointment for which the patient will schedule.

## 2024-05-01 NOTE — POST-PROCEDURE NOTE
Physician Transition of Care Summary  Invasive Cardiovascular Lab    Procedure Date: 5/1/2024  Attending:    * Armond Leyva - Primary  Resident/Fellow/Other Assistant: Surgeons and Role:     * Karla Bullard MD - Fellow    Indications:   Pre-op Diagnosis     * Positive cardiac stress test [R94.39]    Post-procedure diagnosis:   Post-op Diagnosis     * Positive cardiac stress test [R94.39]    Procedure(s):   Left Heart Cath, No LV  21279 - OK CATH PLMT L HRT & ARTS W/NJX & ANGIO IMG S&I        Procedure Findings:   Severe prox and mid LAD disease, severe ISR of prox Lcx stent     Description of the Procedure:   R femoral access s/p 6F sheath s/p perclose. Bed rest for 2h    Complications:   None     Stents/Implants:       Anticoagulation/Antiplatelet Plan:   ASA   Plavix     Estimated Blood Loss:   5 mL    Anesthesia: Moderate Sedation Anesthesia Staff: No anesthesia staff entered.    Any Specimen(s) Removed:   No specimens collected during this procedure.    Disposition:   Home      Electronically signed by: Karla Bullard MD, 5/1/2024 1:12 PM

## 2024-05-08 ENCOUNTER — OFFICE VISIT (OUTPATIENT)
Dept: CARDIAC SURGERY | Facility: HOSPITAL | Age: 69
End: 2024-05-08
Payer: MEDICARE

## 2024-05-08 ENCOUNTER — HOSPITAL ENCOUNTER (OUTPATIENT)
Dept: RADIOLOGY | Facility: CLINIC | Age: 69
Discharge: HOME | End: 2024-05-08
Payer: MEDICARE

## 2024-05-08 ENCOUNTER — APPOINTMENT (OUTPATIENT)
Dept: CARDIAC SURGERY | Facility: HOSPITAL | Age: 69
End: 2024-05-08
Payer: MEDICARE

## 2024-05-08 VITALS
BODY MASS INDEX: 32.58 KG/M2 | SYSTOLIC BLOOD PRESSURE: 136 MMHG | OXYGEN SATURATION: 97 % | DIASTOLIC BLOOD PRESSURE: 75 MMHG | WEIGHT: 208 LBS | HEART RATE: 88 BPM

## 2024-05-08 DIAGNOSIS — R09.89 CHEST PAIN WITH HEMODYNAMIC INSTABILITY: ICD-10-CM

## 2024-05-08 DIAGNOSIS — R07.9 CHEST PAIN WITH HEMODYNAMIC INSTABILITY: ICD-10-CM

## 2024-05-08 DIAGNOSIS — I25.10 CORONARY ARTERY DISEASE INVOLVING NATIVE CORONARY ARTERY OF NATIVE HEART, UNSPECIFIED WHETHER ANGINA PRESENT: Primary | ICD-10-CM

## 2024-05-08 DIAGNOSIS — R92.8 OTHER ABNORMAL AND INCONCLUSIVE FINDINGS ON DIAGNOSTIC IMAGING OF BREAST: ICD-10-CM

## 2024-05-08 PROCEDURE — 99215 OFFICE O/P EST HI 40 MIN: CPT | Performed by: STUDENT IN AN ORGANIZED HEALTH CARE EDUCATION/TRAINING PROGRAM

## 2024-05-08 PROCEDURE — 3078F DIAST BP <80 MM HG: CPT | Performed by: STUDENT IN AN ORGANIZED HEALTH CARE EDUCATION/TRAINING PROGRAM

## 2024-05-08 PROCEDURE — 3075F SYST BP GE 130 - 139MM HG: CPT | Performed by: STUDENT IN AN ORGANIZED HEALTH CARE EDUCATION/TRAINING PROGRAM

## 2024-05-08 PROCEDURE — 99205 OFFICE O/P NEW HI 60 MIN: CPT | Performed by: STUDENT IN AN ORGANIZED HEALTH CARE EDUCATION/TRAINING PROGRAM

## 2024-05-08 PROCEDURE — 3044F HG A1C LEVEL LT 7.0%: CPT | Performed by: STUDENT IN AN ORGANIZED HEALTH CARE EDUCATION/TRAINING PROGRAM

## 2024-05-08 PROCEDURE — 76642 ULTRASOUND BREAST LIMITED: CPT | Mod: RIGHT SIDE | Performed by: STUDENT IN AN ORGANIZED HEALTH CARE EDUCATION/TRAINING PROGRAM

## 2024-05-08 PROCEDURE — 4010F ACE/ARB THERAPY RXD/TAKEN: CPT | Performed by: STUDENT IN AN ORGANIZED HEALTH CARE EDUCATION/TRAINING PROGRAM

## 2024-05-08 PROCEDURE — 3048F LDL-C <100 MG/DL: CPT | Performed by: STUDENT IN AN ORGANIZED HEALTH CARE EDUCATION/TRAINING PROGRAM

## 2024-05-08 PROCEDURE — 1126F AMNT PAIN NOTED NONE PRSNT: CPT | Performed by: STUDENT IN AN ORGANIZED HEALTH CARE EDUCATION/TRAINING PROGRAM

## 2024-05-08 PROCEDURE — 76982 USE 1ST TARGET LESION: CPT | Mod: RT

## 2024-05-08 PROCEDURE — 1159F MED LIST DOCD IN RCRD: CPT | Performed by: STUDENT IN AN ORGANIZED HEALTH CARE EDUCATION/TRAINING PROGRAM

## 2024-05-08 PROCEDURE — 3061F NEG MICROALBUMINURIA REV: CPT | Performed by: STUDENT IN AN ORGANIZED HEALTH CARE EDUCATION/TRAINING PROGRAM

## 2024-05-08 PROCEDURE — 76642 ULTRASOUND BREAST LIMITED: CPT | Mod: RT

## 2024-05-08 ASSESSMENT — ENCOUNTER SYMPTOMS
OCCASIONAL FEELINGS OF UNSTEADINESS: 0
LOSS OF SENSATION IN FEET: 0
DEPRESSION: 0

## 2024-05-08 ASSESSMENT — PAIN SCALES - GENERAL: PAINLEVEL: 0-NO PAIN

## 2024-05-15 ENCOUNTER — TELEPHONE (OUTPATIENT)
Dept: GASTROENTEROLOGY | Facility: CLINIC | Age: 69
End: 2024-05-15

## 2024-05-15 RX ORDER — VITAMIN E 268 MG
400 CAPSULE ORAL DAILY
COMMUNITY

## 2024-05-15 NOTE — TELEPHONE ENCOUNTER
Pt was seen with Rajwinder on 3/25 and per her notes was told NOT to stop the plavix. She states that she had a scheduled colonoscopy that was cancelled because she did not stop the plavix. She is currently scheduled to see you tomorrow for an o/v. Pt wants to know why she is coming?

## 2024-05-15 NOTE — PROGRESS NOTES
Referred by Dr. Elkins for Consult (Patient is here for a surgical evaluation of CAD following referral from Dr. Leyva. DORY Sanchez BSN, RN-BC/)     History Of Present Illness:    Rita Rubi is a 69 y.o. female with hx of hypertension, dyslipidemia, DM II on OHA and family history of premature CAD ( CABG in mother in her 40s and sudden death at age 50). Additionally she has a BMI of 33 after bariatric surgery procedure. She had chest pain and a negative nuclear stress test.  She underwent cath by Dr. Elkins and was found to have multivessel CAD for which she was referred to me for evaluation.  She is still endorsing chest pain and shortness of breath with exertion, made better with reset.    Past Medical History:  She has a past medical history of Disorder of the skin and subcutaneous tissue, unspecified (03/09/2021) and Gastro-esophageal reflux disease with esophagitis, without bleeding (12/18/2013).    Past Surgical History:  She has a past surgical history that includes Total abdominal hysterectomy (09/05/2013); Cataract extraction (03/14/2017); Cataract extraction (01/02/2014); Gastric restriction surgery (08/14/2015); MR angio head wo IV contrast (09/10/2023); MR angio neck wo IV contrast (09/10/2023); Breast biopsy (Left); and Cardiac catheterization (N/A, 5/1/2024).      Social History:  She reports that she has never smoked. She has never been exposed to tobacco smoke. She has never used smokeless tobacco. She reports that she does not currently use alcohol. She reports that she does not use drugs.    Family History:  Family History   Problem Relation Name Age of Onset    Heart attack Mother      Other (esophagus cancer) Father          Allergies:  House dust, Loratadine, Other, and Pollen extracts    Outpatient Medications:  Current Outpatient Medications   Medication Instructions    amLODIPine (NORVASC) 5 mg, oral, Daily    aspirin 81 mg, oral, Daily    atorvastatin (LIPITOR) 80 mg, oral, Nightly     atorvastatin (LIPITOR) 80 mg, oral, Daily    cholecalciferol (VITAMIN D3) 1,000 Units, oral, Daily    clopidogrel (Plavix) 75 mg tablet Take 1 per day    cyanocobalamin (VITAMIN B-12) 250 mcg, oral, Daily    docusate sodium (COLACE) 100 mg, oral, 2 times daily PRN    empagliflozin (JARDIANCE) 10 mg, oral, Daily    enalapril (VASOTEC) 20 mg, oral, 2 times daily    enalapril (VASOTEC) 20 mg, oral, 2 times daily    fluticasone (Flonase) 50 mcg/actuation nasal spray 1-2 sprays QD    furosemide (LASIX) 20 mg, oral, Daily    isosorbide mononitrate ER (IMDUR) 60 mg, oral, Daily, As directed    metFORMIN (GLUCOPHAGE) 500 mg, oral, 2 times daily (morning and late afternoon)    metoprolol succinate XL (TOPROL-XL) 12.5 mg, oral, Daily    mv,calcium,min/iron/folic/vitK (OPTISOURCE ORAL) 1 tablet, oral, Daily    nitroglycerin (Nitrostat) 0.4 mg SL tablet 1 tablet, sublingual, Every 5 min PRN    ONETOUCH ULTRASOFT LANCETS MISC miscellaneous, Use as directed        Last Recorded Vitals:  Vitals:    05/08/24 1154   BP: 136/75   Pulse: 88   SpO2: 97%   Weight: 94.3 kg (208 lb)       Physical Exam:  General: no acute distress  Cardiovascular: Regular rate and rhythm  Respiratory: symmetrical chest rise and fall, no increased work of breathing  Abdomen: soft, non tender, non distended  Extremities: no edema          Last Labs:  CBC -  Lab Results   Component Value Date    WBC 5.6 04/24/2024    HGB 10.8 (L) 04/24/2024    HCT 30.9 (L) 04/24/2024    MCV 82 04/24/2024     04/24/2024       CMP -  Lab Results   Component Value Date    CALCIUM 9.8 04/24/2024    PROT 7.2 01/26/2024    ALBUMIN 4.1 01/26/2024    AST 13 01/26/2024    ALT 12 01/26/2024    ALKPHOS 49 01/26/2024    BILITOT 0.5 01/26/2024       LIPID PANEL -   Lab Results   Component Value Date    CHOL 150 01/29/2024    TRIG 53 01/29/2024    HDL 59.8 01/29/2024    CHHDL 2.5 01/29/2024    LDLF 63 08/11/2023    VLDL 11 01/29/2024    NHDL 90 01/29/2024       RENAL FUNCTION PANEL  -   Lab Results   Component Value Date    GLUCOSE 129 (H) 04/24/2024     04/24/2024    K 4.3 04/24/2024     04/24/2024    CO2 26 04/24/2024    ANIONGAP 13 04/24/2024    BUN 21 04/24/2024    CREATININE 0.98 04/24/2024    GFRMALE CANCELED 09/15/2023    CALCIUM 9.8 04/24/2024    ALBUMIN 4.1 01/26/2024        Lab Results   Component Value Date    HGBA1C 5.7 (H) 01/26/2024         Echo:  Transthoracic Echo (TTE) Complete 10/10/2023    1. Poorly visualized anatomical structures due to suboptimal image quality.  2. Left ventricular systolic function is normal with a 65% estimated ejection fraction.  3. Spectral Doppler shows an impaired relaxation pattern of left ventricular diastolic filling.  4. Mild aortic valve regurgitation.  5. Atrial septal aneurysm present.  6. Compared with study from 2/27/2023, no significant changea bubble study was performed on the current examination and within the limits of a technically challenging study it appears to be negative.    Cath:  Cardiac Catheterization Procedure 05/01/2024  75% and 90% LAD stenosis  90% in stent re-stenosis of the circumflex    Cardiac Imaging:  MR cardiac w and wo IV contrast w regadenoson stress for MORPH FUNCT and valve DZ 04/05/2024  IMPRESSION:  1. The left ventricle is normal in size, shape, and has normal global  systolic function. LVEF = 74%. There are no segmental wall motion  abnormalities. Quantitative values are as noted above.  2. Small reversible defect that is subendocardial in location on  stress sequences within the mid anterior wall, most consistent with  ischemia.  3. There are no findings to suggest prior ischemic damage or an  infiltrative process.  4. Mildly dilated left atrium.  5. Mild aortic regurgitation (regurgitant fraction = 15%).          Assessment/Plan   This is a 69 yr/old female with multivessel CAD including in stent re-stenosis of the circumflex and proximal LAD disease.  Her EF is preserved and she does not have  any other significant valvular disease.  I have recommended that she undergo CABG with LIMA and radial artery.      Her pre operative studies/labs are ordered  She is planning for a colonoscopy next week and then we will get her scheduled for surgery    The risks benefits and alternatives were discussed with the patient in detail.  The risks include but are not limited to  bleeding, infection, injury to other structures, atrial fibrillation, arrhythmias, graft failure, renal failure, stroke, MI and death.  All the patients questions were answered.       Franci Kee MD  Cardiac Surgery

## 2024-05-15 NOTE — H&P
Referred by Dr. Elkins for Consult (Patient is here for a surgical evaluation of CAD following referral from Dr. Leyva. DORY Sanchez BSN, RN-BC/)     History Of Present Illness:    Rita Rubi is a 69 y.o. female with hx of hypertension, dyslipidemia, DM II on OHA and family history of premature CAD ( CABG in mother in her 40s and sudden death at age 50). Additionally she has a BMI of 33 after bariatric surgery procedure. She had chest pain and a negative nuclear stress test.  She underwent cath by Dr. Elkins and was found to have multivessel CAD for which she was referred to me for evaluation.  She is still endorsing chest pain and shortness of breath with exertion, made better with reset.    Past Medical History:  She has a past medical history of Disorder of the skin and subcutaneous tissue, unspecified (03/09/2021) and Gastro-esophageal reflux disease with esophagitis, without bleeding (12/18/2013).     Past Surgical History:  She has a past surgical history that includes Total abdominal hysterectomy (09/05/2013); Cataract extraction (03/14/2017); Cataract extraction (01/02/2014); Gastric restriction surgery (08/14/2015); MR angio head wo IV contrast (09/10/2023); MR angio neck wo IV contrast (09/10/2023); Breast biopsy (Left); and Cardiac catheterization (N/A, 5/1/2024).      Social History:  She reports that she has never smoked. She has never been exposed to tobacco smoke. She has never used smokeless tobacco. She reports that she does not currently use alcohol. She reports that she does not use drugs.     Family History:  Family History          Family History   Problem Relation Name Age of Onset    Heart attack Mother        Other (esophagus cancer) Father                Allergies:  House dust, Loratadine, Other, and Pollen extracts     Outpatient Medications:       Current Outpatient Medications   Medication Instructions    amLODIPine (NORVASC) 5 mg, oral, Daily    aspirin 81 mg, oral, Daily     atorvastatin (LIPITOR) 80 mg, oral, Nightly    atorvastatin (LIPITOR) 80 mg, oral, Daily    cholecalciferol (VITAMIN D3) 1,000 Units, oral, Daily    clopidogrel (Plavix) 75 mg tablet Take 1 per day    cyanocobalamin (VITAMIN B-12) 250 mcg, oral, Daily    docusate sodium (COLACE) 100 mg, oral, 2 times daily PRN    empagliflozin (JARDIANCE) 10 mg, oral, Daily    enalapril (VASOTEC) 20 mg, oral, 2 times daily    enalapril (VASOTEC) 20 mg, oral, 2 times daily    fluticasone (Flonase) 50 mcg/actuation nasal spray 1-2 sprays QD    furosemide (LASIX) 20 mg, oral, Daily    isosorbide mononitrate ER (IMDUR) 60 mg, oral, Daily, As directed    metFORMIN (GLUCOPHAGE) 500 mg, oral, 2 times daily (morning and late afternoon)    metoprolol succinate XL (TOPROL-XL) 12.5 mg, oral, Daily    mv,calcium,min/iron/folic/vitK (OPTISOURCE ORAL) 1 tablet, oral, Daily    nitroglycerin (Nitrostat) 0.4 mg SL tablet 1 tablet, sublingual, Every 5 min PRN    ONETOUCH ULTRASOFT LANCETS MISC miscellaneous, Use as directed         Last Recorded Vitals:  Vitals       Vitals:     05/08/24 1154   BP: 136/75   Pulse: 88   SpO2: 97%   Weight: 94.3 kg (208 lb)            Physical Exam:  General: no acute distress  Cardiovascular: Regular rate and rhythm  Respiratory: symmetrical chest rise and fall, no increased work of breathing  Abdomen: soft, non tender, non distended  Extremities: no edema            Last Labs:  CBC -        Lab Results   Component Value Date     WBC 5.6 04/24/2024     HGB 10.8 (L) 04/24/2024     HCT 30.9 (L) 04/24/2024     MCV 82 04/24/2024      04/24/2024         CMP -        Lab Results   Component Value Date     CALCIUM 9.8 04/24/2024     PROT 7.2 01/26/2024     ALBUMIN 4.1 01/26/2024     AST 13 01/26/2024     ALT 12 01/26/2024     ALKPHOS 49 01/26/2024     BILITOT 0.5 01/26/2024         LIPID PANEL -         Lab Results   Component Value Date     CHOL 150 01/29/2024     TRIG 53 01/29/2024     HDL 59.8 01/29/2024     CHHDL  2.5 01/29/2024     LDLF 63 08/11/2023     VLDL 11 01/29/2024     NHDL 90 01/29/2024         RENAL FUNCTION PANEL -         Lab Results   Component Value Date     GLUCOSE 129 (H) 04/24/2024      04/24/2024     K 4.3 04/24/2024      04/24/2024     CO2 26 04/24/2024     ANIONGAP 13 04/24/2024     BUN 21 04/24/2024     CREATININE 0.98 04/24/2024     GFRMALE CANCELED 09/15/2023     CALCIUM 9.8 04/24/2024     ALBUMIN 4.1 01/26/2024               Lab Results   Component Value Date     HGBA1C 5.7 (H) 01/26/2024            Echo:  Transthoracic Echo (TTE) Complete 10/10/2023     1. Poorly visualized anatomical structures due to suboptimal image quality.  2. Left ventricular systolic function is normal with a 65% estimated ejection fraction.  3. Spectral Doppler shows an impaired relaxation pattern of left ventricular diastolic filling.  4. Mild aortic valve regurgitation.  5. Atrial septal aneurysm present.  6. Compared with study from 2/27/2023, no significant changea bubble study was performed on the current examination and within the limits of a technically challenging study it appears to be negative.     Cath:  Cardiac Catheterization Procedure 05/01/2024  75% and 90% LAD stenosis  90% in stent re-stenosis of the circumflex     Cardiac Imaging:  MR cardiac w and wo IV contrast w regadenoson stress for MORPH FUNCT and valve DZ 04/05/2024  IMPRESSION:  1. The left ventricle is normal in size, shape, and has normal global  systolic function. LVEF = 74%. There are no segmental wall motion  abnormalities. Quantitative values are as noted above.  2. Small reversible defect that is subendocardial in location on  stress sequences within the mid anterior wall, most consistent with  ischemia.  3. There are no findings to suggest prior ischemic damage or an  infiltrative process.  4. Mildly dilated left atrium.  5. Mild aortic regurgitation (regurgitant fraction = 15%).                 Assessment/Plan   This is a 69  yr/old female with multivessel CAD including in stent re-stenosis of the circumflex and proximal LAD disease.  Her EF is preserved and she does not have any other significant valvular disease.  I have recommended that she undergo CABG with LIMA and radial artery.       Her pre operative studies/labs are ordered  She is planning for a colonoscopy next week and then we will get her scheduled for surgery     The risks benefits and alternatives were discussed with the patient in detail.  The risks include but are not limited to  bleeding, infection, injury to other structures, atrial fibrillation, arrhythmias, graft failure, renal failure, stroke, MI and death.  All the patients questions were answered.         Franci Kee MD  Cardiac Surgery

## 2024-05-16 ENCOUNTER — APPOINTMENT (OUTPATIENT)
Dept: GASTROENTEROLOGY | Facility: CLINIC | Age: 69
End: 2024-05-16

## 2024-05-16 PROBLEM — I25.10 CORONARY ARTERY DISEASE INVOLVING NATIVE CORONARY ARTERY OF NATIVE HEART: Status: ACTIVE | Noted: 2024-05-08

## 2024-05-17 ENCOUNTER — TELEPHONE (OUTPATIENT)
Dept: CARDIOLOGY | Facility: HOSPITAL | Age: 69
End: 2024-05-17

## 2024-05-17 DIAGNOSIS — I25.10 ATHEROSCLEROTIC CARDIOVASCULAR DISEASE: Primary | ICD-10-CM

## 2024-05-17 RX ORDER — ISOSORBIDE MONONITRATE 30 MG/1
90 TABLET, EXTENDED RELEASE ORAL DAILY
Qty: 90 TABLET | Refills: 2 | Status: SHIPPED | OUTPATIENT
Start: 2024-05-17 | End: 2024-08-15

## 2024-05-17 RX ORDER — NITROGLYCERIN 0.4 MG/1
0.4 TABLET SUBLINGUAL EVERY 5 MIN PRN
Qty: 90 TABLET | Refills: 1 | Status: SHIPPED | OUTPATIENT
Start: 2024-05-17 | End: 2024-08-15

## 2024-05-17 NOTE — TELEPHONE ENCOUNTER
Please see communication for this patient below:  Pt was called with the following instructions from Dr Leyva. Pt  verbalized understating of medications instructions.          Please tell her that Imdur has been increased to 90mg qd (3 tablets of her 30mg pills)  Additional sublingual nitroglycerin also sent  All to [facility]    Please tell her that sublingual nitro should be taken 5 minutes apart. If pain persists after 3 pills. Then she needs to go to the nearest emergency room    [facility]    On May 17, 2024, at 10:34?AM, Carmen Nguyen [email or web address] wrote:  ?   Dr. Leyva,     Patient called and says she has been having a little pain and wants a prescription of Nitroglycerin to go to [facility] in [place].   If needed she could be reached again at [phone]

## 2024-05-22 ENCOUNTER — APPOINTMENT (OUTPATIENT)
Dept: VASCULAR MEDICINE | Facility: HOSPITAL | Age: 69
End: 2024-05-22
Payer: MEDICARE

## 2024-05-22 ENCOUNTER — APPOINTMENT (OUTPATIENT)
Dept: RADIOLOGY | Facility: HOSPITAL | Age: 69
End: 2024-05-22
Payer: MEDICARE

## 2024-06-19 ENCOUNTER — CLINICAL SUPPORT (OUTPATIENT)
Dept: PREADMISSION TESTING | Facility: HOSPITAL | Age: 69
End: 2024-06-19
Payer: COMMERCIAL

## 2024-06-19 DIAGNOSIS — I25.10 CORONARY ARTERY DISEASE INVOLVING NATIVE CORONARY ARTERY OF NATIVE HEART, UNSPECIFIED WHETHER ANGINA PRESENT: ICD-10-CM

## 2024-06-24 ENCOUNTER — HOSPITAL ENCOUNTER (OUTPATIENT)
Dept: RADIOLOGY | Facility: HOSPITAL | Age: 69
Discharge: HOME | End: 2024-06-24
Payer: COMMERCIAL

## 2024-06-24 ENCOUNTER — HOSPITAL ENCOUNTER (OUTPATIENT)
Dept: VASCULAR MEDICINE | Facility: HOSPITAL | Age: 69
Discharge: HOME | End: 2024-06-24
Payer: COMMERCIAL

## 2024-06-24 ENCOUNTER — LAB (OUTPATIENT)
Dept: LAB | Facility: LAB | Age: 69
End: 2024-06-24
Payer: COMMERCIAL

## 2024-06-24 ENCOUNTER — DOCUMENTATION (OUTPATIENT)
Dept: PREADMISSION TESTING | Facility: HOSPITAL | Age: 69
End: 2024-06-24

## 2024-06-24 ENCOUNTER — PRE-ADMISSION TESTING (OUTPATIENT)
Dept: PREADMISSION TESTING | Facility: HOSPITAL | Age: 69
End: 2024-06-24
Payer: COMMERCIAL

## 2024-06-24 VITALS
SYSTOLIC BLOOD PRESSURE: 138 MMHG | RESPIRATION RATE: 18 BRPM | WEIGHT: 205.03 LBS | DIASTOLIC BLOOD PRESSURE: 65 MMHG | HEART RATE: 70 BPM | BODY MASS INDEX: 32.18 KG/M2 | HEIGHT: 67 IN | TEMPERATURE: 97.9 F | OXYGEN SATURATION: 99 %

## 2024-06-24 DIAGNOSIS — I65.23 OCCLUSION AND STENOSIS OF BILATERAL CAROTID ARTERIES: ICD-10-CM

## 2024-06-24 DIAGNOSIS — I25.10 CORONARY ARTERY DISEASE INVOLVING NATIVE CORONARY ARTERY OF NATIVE HEART, UNSPECIFIED WHETHER ANGINA PRESENT: ICD-10-CM

## 2024-06-24 DIAGNOSIS — Z01.818 PREOP TESTING: ICD-10-CM

## 2024-06-24 DIAGNOSIS — R07.9 CHEST PAIN WITH HEMODYNAMIC INSTABILITY: ICD-10-CM

## 2024-06-24 DIAGNOSIS — R09.89 CHEST PAIN WITH HEMODYNAMIC INSTABILITY: ICD-10-CM

## 2024-06-24 DIAGNOSIS — Z01.818 PREOP TESTING: Primary | ICD-10-CM

## 2024-06-24 LAB
ABO GROUP (TYPE) IN BLOOD: NORMAL
ALBUMIN SERPL BCP-MCNC: 4.1 G/DL (ref 3.4–5)
ALP SERPL-CCNC: 47 U/L (ref 33–136)
ALT SERPL W P-5'-P-CCNC: 12 U/L (ref 7–45)
ANION GAP SERPL CALC-SCNC: 13 MMOL/L (ref 10–20)
ANTIBODY SCREEN: NORMAL
APPEARANCE UR: CLEAR
APTT PPP: 30 SECONDS (ref 27–38)
AST SERPL W P-5'-P-CCNC: 14 U/L (ref 9–39)
BASOPHILS # BLD AUTO: 0.03 X10*3/UL (ref 0–0.1)
BASOPHILS NFR BLD AUTO: 0.5 %
BILIRUB SERPL-MCNC: 0.7 MG/DL (ref 0–1.2)
BILIRUB UR STRIP.AUTO-MCNC: NEGATIVE MG/DL
BUN SERPL-MCNC: 17 MG/DL (ref 6–23)
CALCIUM SERPL-MCNC: 10 MG/DL (ref 8.6–10.3)
CHLORIDE SERPL-SCNC: 102 MMOL/L (ref 98–107)
CO2 SERPL-SCNC: 27 MMOL/L (ref 21–32)
COLOR UR: ABNORMAL
CREAT SERPL-MCNC: 0.94 MG/DL (ref 0.5–1.05)
CRP SERPL-MCNC: <0.1 MG/DL
EGFRCR SERPLBLD CKD-EPI 2021: 65 ML/MIN/1.73M*2
EOSINOPHIL # BLD AUTO: 0.03 X10*3/UL (ref 0–0.4)
EOSINOPHIL NFR BLD AUTO: 0.5 %
ERYTHROCYTE [DISTWIDTH] IN BLOOD BY AUTOMATED COUNT: 17 % (ref 11.5–14.5)
EST. AVERAGE GLUCOSE BLD GHB EST-MCNC: 128 MG/DL
GLUCOSE SERPL-MCNC: 110 MG/DL (ref 74–99)
GLUCOSE UR STRIP.AUTO-MCNC: ABNORMAL MG/DL
HBA1C MFR BLD: 6.1 %
HCT VFR BLD AUTO: 32.3 % (ref 36–46)
HGB BLD-MCNC: 11.1 G/DL (ref 12–16)
HOLD SPECIMEN: NORMAL
IMM GRANULOCYTES # BLD AUTO: 0.02 X10*3/UL (ref 0–0.5)
IMM GRANULOCYTES NFR BLD AUTO: 0.3 % (ref 0–0.9)
INR PPP: 1 (ref 0.9–1.1)
KETONES UR STRIP.AUTO-MCNC: NEGATIVE MG/DL
LEUKOCYTE ESTERASE UR QL STRIP.AUTO: ABNORMAL
LYMPHOCYTES # BLD AUTO: 1.32 X10*3/UL (ref 0.8–3)
LYMPHOCYTES NFR BLD AUTO: 22.8 %
MCH RBC QN AUTO: 28 PG (ref 26–34)
MCHC RBC AUTO-ENTMCNC: 34.4 G/DL (ref 32–36)
MCV RBC AUTO: 82 FL (ref 80–100)
MONOCYTES # BLD AUTO: 0.43 X10*3/UL (ref 0.05–0.8)
MONOCYTES NFR BLD AUTO: 7.4 %
NEUTROPHILS # BLD AUTO: 3.96 X10*3/UL (ref 1.6–5.5)
NEUTROPHILS NFR BLD AUTO: 68.5 %
NITRITE UR QL STRIP.AUTO: NEGATIVE
NRBC BLD-RTO: 0 /100 WBCS (ref 0–0)
PH UR STRIP.AUTO: 6.5 [PH]
PLATELET # BLD AUTO: 203 X10*3/UL (ref 150–450)
POTASSIUM SERPL-SCNC: 4.2 MMOL/L (ref 3.5–5.3)
PROT SERPL-MCNC: 6.6 G/DL (ref 6.4–8.2)
PROT UR STRIP.AUTO-MCNC: ABNORMAL MG/DL
PROTHROMBIN TIME: 11.4 SECONDS (ref 9.8–12.8)
RBC # BLD AUTO: 3.96 X10*6/UL (ref 4–5.2)
RBC # UR STRIP.AUTO: NEGATIVE /UL
RBC #/AREA URNS AUTO: ABNORMAL /HPF
RH FACTOR (ANTIGEN D): NORMAL
SODIUM SERPL-SCNC: 138 MMOL/L (ref 136–145)
SP GR UR STRIP.AUTO: 1.04
SQUAMOUS #/AREA URNS AUTO: ABNORMAL /HPF
UROBILINOGEN UR STRIP.AUTO-MCNC: NORMAL MG/DL
WBC # BLD AUTO: 5.8 X10*3/UL (ref 4.4–11.3)
WBC #/AREA URNS AUTO: ABNORMAL /HPF
YEAST BUDDING #/AREA UR COMP ASSIST: PRESENT /HPF

## 2024-06-24 PROCEDURE — 93010 ELECTROCARDIOGRAM REPORT: CPT | Performed by: INTERNAL MEDICINE

## 2024-06-24 PROCEDURE — 83036 HEMOGLOBIN GLYCOSYLATED A1C: CPT

## 2024-06-24 PROCEDURE — 86901 BLOOD TYPING SEROLOGIC RH(D): CPT

## 2024-06-24 PROCEDURE — 86900 BLOOD TYPING SEROLOGIC ABO: CPT

## 2024-06-24 PROCEDURE — 86140 C-REACTIVE PROTEIN: CPT

## 2024-06-24 PROCEDURE — 85610 PROTHROMBIN TIME: CPT

## 2024-06-24 PROCEDURE — 86850 RBC ANTIBODY SCREEN: CPT

## 2024-06-24 PROCEDURE — 80053 COMPREHEN METABOLIC PANEL: CPT

## 2024-06-24 PROCEDURE — 85025 COMPLETE CBC W/AUTO DIFF WBC: CPT

## 2024-06-24 PROCEDURE — 93880 EXTRACRANIAL BILAT STUDY: CPT

## 2024-06-24 PROCEDURE — 74176 CT ABD & PELVIS W/O CONTRAST: CPT

## 2024-06-24 PROCEDURE — 87086 URINE CULTURE/COLONY COUNT: CPT

## 2024-06-24 PROCEDURE — 71046 X-RAY EXAM CHEST 2 VIEWS: CPT

## 2024-06-24 PROCEDURE — 81001 URINALYSIS AUTO W/SCOPE: CPT

## 2024-06-24 PROCEDURE — 93880 EXTRACRANIAL BILAT STUDY: CPT | Performed by: INTERNAL MEDICINE

## 2024-06-24 PROCEDURE — 93005 ELECTROCARDIOGRAM TRACING: CPT | Performed by: PHYSICIAN ASSISTANT

## 2024-06-24 PROCEDURE — 36415 COLL VENOUS BLD VENIPUNCTURE: CPT

## 2024-06-24 PROCEDURE — 85730 THROMBOPLASTIN TIME PARTIAL: CPT

## 2024-06-24 PROCEDURE — 87081 CULTURE SCREEN ONLY: CPT | Mod: AHULAB | Performed by: PHYSICIAN ASSISTANT

## 2024-06-24 RX ORDER — CHLORHEXIDINE GLUCONATE ORAL RINSE 1.2 MG/ML
SOLUTION DENTAL
Qty: 473 ML | Refills: 0 | Status: SHIPPED | OUTPATIENT
Start: 2024-06-24

## 2024-06-24 ASSESSMENT — ENCOUNTER SYMPTOMS
WEAKNESS: 1
CARDIOVASCULAR NEGATIVE: 1
HEMATOLOGIC/LYMPHATIC NEGATIVE: 1
ENDOCRINE NEGATIVE: 1
PSYCHIATRIC NEGATIVE: 1
CONSTITUTIONAL NEGATIVE: 1
RESPIRATORY NEGATIVE: 1
ARTHRALGIAS: 1
GASTROINTESTINAL NEGATIVE: 1
EYES NEGATIVE: 1
ALLERGIC/IMMUNOLOGIC NEGATIVE: 1

## 2024-06-24 NOTE — PREPROCEDURE INSTRUCTIONS
Medication List            Accurate as of June 24, 2024 11:00 AM. Always use your most recent med list.                alpha tocopherol 268 mg (400 unit) capsule  Commonly known as: Vitamin E  Medication Adjustments for Surgery: Stop 7 days before surgery     amLODIPine 5 mg tablet  Commonly known as: Norvasc  TAKE 1 TABLET BY MOUTH EVERY DAY  Medication Adjustments for Surgery: Take morning of surgery with sip of water, no other fluids     aspirin 81 mg EC tablet  Take 1 tablet (81 mg) by mouth once daily.  Medication Adjustments for Surgery: Take morning of surgery with sip of water, no other fluids     atorvastatin 80 mg tablet  Commonly known as: Lipitor  Take 1 tablet (80 mg) by mouth once daily at bedtime.  Notes to patient: Continue night prior to surgery     clopidogrel 75 mg tablet  Commonly known as: Plavix  TAKE 1 TABLET BY MOUTH EVERY DAY  Medication Adjustments for Surgery: Stop 7 days before surgery     cyanocobalamin 250 mcg tablet  Commonly known as: Vitamin B-12  Medication Adjustments for Surgery: Continue until night before surgery     docusate sodium 100 mg capsule  Commonly known as: Colace  Take 1 capsule (100 mg) by mouth 2 times a day as needed for constipation.  Medication Adjustments for Surgery: Continue until night before surgery     empagliflozin 10 mg  Commonly known as: Jardiance  Take 1 tablet (10 mg) by mouth once daily.  Medication Adjustments for Surgery: Stop 3 days before surgery     enalapril 20 mg tablet  Commonly known as: Vasotec  Take 1 tablet (20 mg) by mouth 2 times a day.  Medication Adjustments for Surgery: Stop 3 days before surgery     fluticasone 50 mcg/actuation nasal spray  Commonly known as: Flonase  1-2 sprays QD  Notes to patient: Use if needed morning of surgery     furosemide 20 mg tablet  Commonly known as: Lasix  TAKE 1 TABLET BY MOUTH EVERY DAY  Medication Adjustments for Surgery: Stop 3 days before surgery     isosorbide mononitrate ER 30 mg 24 hr  tablet  Commonly known as: Imdur  Take 3 tablets (90 mg) by mouth once daily. As directed  Medication Adjustments for Surgery: Take morning of surgery with sip of water, no other fluids     metFORMIN 500 mg tablet  Commonly known as: Glucophage  TAKE 1 TABLET BY MOUTH TWICE A DAY WITH MEALS  Medication Adjustments for Surgery: Stop 3 days before surgery     metoprolol succinate XL 25 mg 24 hr tablet  Commonly known as: Toprol-XL  Take 0.5 tablets (12.5 mg) by mouth once daily.  Medication Adjustments for Surgery: Take morning of surgery with sip of water, no other fluids     nitroglycerin 0.4 mg SL tablet  Commonly known as: Nitrostat  Place 1 tablet (0.4 mg) under the tongue every 5 minutes if needed for chest pain.  Notes to patient: Use if needed morning of surgery     ONETOUCH ULTRASOFT LANCETS MISC     OPTISOURCE ORAL  Medication Adjustments for Surgery: Stop 7 days before surgery            Concerning above medication instructions- If medication is normally taken at night continue normal schedule - do not take night prior and morning of surgery.     CONTACT SURGEON'S OFFICE IF YOU DEVELOP:  * Fever = 100.4 F   * New respiratory symptoms (e.g. cough, shortness of breath, respiratory distress, sore throat)  * Recent loss of taste or smell  *Flu like symptoms such as headache, fatigue or gastrointestinal symptoms  * You develop any open sores, shingles, burning or painful urination   AND/OR:  * You no longer wish to have the surgery.  * Any other personal circumstances change that may lead to the need to cancel or defer this surgery.  *You were admitted to any hospital within one week of your planned procedure.    SMOKING:  *Quitting smoking can make a huge difference to your health and recovery from surgery.    *If you need help with quitting, call 8-800-QUIT-NOW.    DAY BEFORE SURGERY:  Nothing by mouth (solid or liquid) after midnight the night before your surgery/procedure.           If DIABETIC - Please  check fasting blood sugar upon waking up.  If fasting sugar is <80 mg/dl, please drink 100ml/3oz of apple juice no later than 2 hours prior to arrival time to surgery.      SURGICAL TIME  *You will be contacted between 2 p.m. and 6 p.m. the business day before your surgery with your arrival time.  *If you haven't received a call by 6pm, call [phone].  *Scheduled surgery times may change and you will be notified if this occurs-check your personal voicemail for any updates.    ON THE MORNING OF SURGERY:  *Wear comfortable, loose fitting clothing.   *Do not use moisturizers, creams, lotions or perfume.  *All jewelry and valuables should be left at home.  *Prosthetic devices such as contact lenses, hearing aids, dentures, eyelash extensions, hairpins and body piercing must be removed before surgery.    BRING WITH YOU:  *Photo ID and insurance card  *Current list of medicines and allergies  *Pacemaker/Defibrillator/Heart stent cards  *CPAP machine and mask  *Slings/splints/crutches  *Copy of your complete Advanced Directive/DHPOA-if applicable  *Neurostimulator implant remote    PARKING AND ARRIVAL:  *Check in at the Main Entrance desk and let them know you are here for surgery.  *You will be directed to the 2nd floor surgical waiting area.    AFTER OUTPATIENT SURGERY:  *A responsible adult MUST accompany you at the time of discharge and stay with you for 24 hours after your surgery.  *You may NOT drive yourself home after surgery.  *You may use a taxi or ride sharing service ([employer], [employer]) to return home ONLY if you are accompanied by a friend or family member.  *Instructions for resuming your medications will be provided by your surgeon.      Home Preoperative Antibacterial Shower     What is a home preoperative antibacterial shower?  This shower is a way of cleaning the skin with a germ killing soap before surgery.  The soap contains chlorhexidine, commonly known as CHG.  CHG is a soap for your skin with germ  killing ability.  Let your doctor know if you are allergic to chlorhexidine.    Why do I need to take a preoperative antibacterial shower?  Skin is not sterile.  It is best to try to make your skin as free of germs as possible before surgery.  Proper cleansing with a germ killing soap before surgery can lower the number of germs on your skin.  This helps to reduce the risk of infection at the surgical site.  Following the instructions listed below will help you prepare your skin for surgery.      How do I use the CHG skin cleanser?  Steps:  Begin using your CHG soap five days before your scheduled surgery on ________________________.    Days 1-4 Shower before bed:  Wash your face and genitals with your normal soap and rinse.    Wash and rinse your hair using the CHG soap. Rinse completely, do not condition your   Hair.          3.    Apply the CHG soap to a clean wet washcloth.  Turn the water off or move away                From the water spray to avoid premature rinsing of the CHG soap as you are applying.     4.   Lather your entire body from the neck down.  Do not use on your face or genitals.   Pay special attention to the area(s) where your incision(s) will be located unless they are on your face.  Avoid scrubbing your skin too hard.  The important point is to have the CHG soap sit on your skin for 3 minutes.    When the 3 minutes are up, turn on the water and rinse the CHG soap off your body completely.   Pat yourself dry with a clean, freshly-laundered towel.  Dress in clean, freshly laundered night clothes.    Be sure to sleep with clean, freshly laundered sheets.  Day 5:  Last shower is the morning before surgery: Follow above Instructions.    NOTE:    *Hair extensions should be removed    *Keep CHG soap out of eyes and ear canals   *DO NOT wash with regular soap on your body after you have used the CHG        soap solution  *DO NOT apply powders, lotions, or perfume.  *Deodorant may be used days 1-4, BUT  NOT the day of surgery    Who should I contact if I have any questions regarding the use of CHG soap?  Call the [facility], Preadmission Testing at [phone] if you have any questions.              Patient Information: Pre-Operative Infection Prevention Measures     Why did I have my nose, under my arms and groin swabbed?  The purpose of the swab is to identify Staphylococcus aureus inside your nose or on your skin.  The swab was sent to the laboratory for culture.  A positive swab/culture for Staphylococcus aureus is called colonization or carriage.      What is Staphylococcus aureus?  Staphylococcus aureus, also known as “staph”, is a germ found on the skin or in the nose of healthy people.  Sometimes Staphylococcus aureus can get into the body and cause an infection.  This can be minor (such as pimples, boils or other skin problems).  It might also be serious (such as blood infection, pneumonia or a surgical site infection).    What is Staphylococcus aureus colonization or carriage?  Colonization or carriage means that a person has the germ but is not sick from it.  These bacteria can be spread on the hands or when breathing or sneezing.    How is Staphylococcus aureus spread?  It is most often spread by close contact with a person or item that carries it.    What happens if my culture is positive for Staphylococcus aureus?  Your doctor/medical team will use this information to guide any antibiotic treatment which may be necessary.  Regardless of the culture results, we will clean the inside of your nose with a betadine swab just before you have your surgery.      Will I get an infection if I have Staphylococcus aureus in my nose or on my skin?  Anyone can get an infection with Staphylococcus aureus.  However, the best way to reduce your risk of infection is to follow the instructions provided to you for the use of your CHG soap and dental rinse.        Who should I contact if I  have any questions?  Call the [facility], Preadmission Testing at [phone] if you have any questions.         Peridex (Chlorhexidine Gluconate) Mouthwash       Before using, read all instructions.  A prescription for this mouthwash has been sent to your preferred local pharmacy     What is chlorhexidine mouthwash and what is its use?   Chlorhexidine mouthwash is used to treat bacteria in your mouth and teeth that can lead to infections.      How should I use Chlorhexidine mouthwash?               Use the mouthwash 2 times:   1. the night before surgery.  2. the morning of your surgery.        *First brush and floss your teeth.     *Use 3 teaspoons or 15 ml of mouthwash     *Swish mouthwash in mouth for at least 30 seconds and spit it out     *Do not swallow the mouthwash.          Pre-Op Carbohydrate loading with Apple Juice     Drinking Apple Juice, a carbohydrate rich drink, helps to improve your recovery after surgery.     No later than 2 hours before your arrival at the hospital, drink 12oz of Apple Juice.

## 2024-06-24 NOTE — CPM/PAT NURSE NOTE
CPM/PAT Nurse Note      Name: Rita Rubi (Rita Rubi)  /Age: 1955/69 y.o.       Past Medical History:   Diagnosis Date    Allergic rhinitis     Anemia     Anticoagulated     Plavix    Arthritis     left knee    Coronary artery disease involving native coronary artery of native heart, unspecified whether angina present     GERD (gastroesophageal reflux disease)     under control    Hyperlipidemia     Hypertension     Ischemic stroke (Multi) 2023    Cerebral infarction due to thrombosis of right middle cerebral artery    TYLER (obstructive sleep apnea)     does not use CPAP (stopped after her bariatric surgery)    Stuttering     has become worse since her stroke    Type 2 diabetes mellitus (Multi)     2024 A1C 5.7%       Past Surgical History:   Procedure Laterality Date    BREAST BIOPSY Left     benign    CARDIAC CATHETERIZATION N/A 2024    Procedure: Left Heart Cath, No LV;  Surgeon: Armond Leyva MD;  Location: [address] Cardiac Cath Lab;  Service: Cardiovascular;  Laterality: N/A;    CATARACT EXTRACTION Left 2016    CATARACT EXTRACTION Right 2013    CORONARY ANGIOPLASTY WITH STENT PLACEMENT  2023    FASCIOTOMY Left 2011    ENDOSCOPIC FASCIOTOMY PLANTAR    GASTRIC RESTRICTION SURGERY      MR HEAD ANGIO WO IV CONTRAST  09/10/2023    MR HEAD ANGIO WO IV CONTRAST 9/10/2023 [facility]    MR NECK ANGIO WO IV CONTRAST  09/10/2023    MR NECK ANGIO WO IV CONTRAST 9/10/2023 [facility]    TOTAL ABDOMINAL HYSTERECTOMY         Patient Sexual activity questions deferred to the physician.    Family History   Problem Relation Name Age of Onset    Heart attack Mother          CABG in mother in her 40s and sudden death at age 50    Esophageal cancer Father      Epilepsy Sister         Allergies   Allergen Reactions    House Dust Runny nose    Pollen Extracts Hives and Itching       Prior to Admission medications    Medication Sig Start Date End Date Taking? Authorizing Provider   alpha  tocopherol (Vitamin E) 268 mg (400 unit) capsule Take 1 capsule (400 Units) by mouth once daily.    Historical Provider, MD   amLODIPine (Norvasc) 5 mg tablet TAKE 1 TABLET BY MOUTH EVERY DAY 6/9/24   Pretty Link MD   aspirin 81 mg EC tablet Take 1 tablet (81 mg) by mouth once daily. 2/15/24   Pretty Link MD   atorvastatin (Lipitor) 80 mg tablet Take 1 tablet (80 mg) by mouth once daily at bedtime. 1/25/24   Pretty Link MD   chlorhexidine (Peridex) 0.12 % solution 15 ml swish and spit for 30 seconds night prior to surgery and morning of surgery 6/24/24   Maddie Sharma PA-C   clopidogrel (Plavix) 75 mg tablet TAKE 1 TABLET BY MOUTH EVERY DAY 6/9/24   Pretty Link MD   cyanocobalamin (Vitamin B-12) 250 mcg tablet Take 1 tablet (250 mcg) by mouth once daily.    Historical Provider, MD   docusate sodium (Colace) 100 mg capsule Take 1 capsule (100 mg) by mouth 2 times a day as needed for constipation. 12/8/23   Armond Leyva MD   empagliflozin (Jardiance) 10 mg Take 1 tablet (10 mg) by mouth once daily. 2/28/24 2/27/25  Pretty Link MD   enalapril (Vasotec) 20 mg tablet Take 1 tablet (20 mg) by mouth 2 times a day. 2/15/24 2/14/25  Pretty Link MD   fluticasone (Flonase) 50 mcg/actuation nasal spray 1-2 sprays QD  Patient taking differently: Administer 1 spray into each nostril if needed for allergies or rhinitis. 1-2 sprays QD 2/15/24   Pretty Link MD   furosemide (Lasix) 20 mg tablet TAKE 1 TABLET BY MOUTH EVERY DAY 6/9/24   Pretty Link MD   isosorbide mononitrate ER (Imdur) 30 mg 24 hr tablet Take 3 tablets (90 mg) by mouth once daily. As directed 5/17/24 8/15/24  Armond Leyav MD   metFORMIN (Glucophage) 500 mg tablet TAKE 1 TABLET BY MOUTH TWICE A DAY WITH MEALS 6/9/24   Pretty Link MD   metoprolol succinate XL (Toprol-XL) 25 mg 24 hr tablet Take 0.5 tablets (12.5 mg) by mouth once daily. 1/25/24    Pretty Link MD   mv,calcium,min/iron/folic/vitK (OPTISOURCE ORAL) Take 1 tablet by mouth once daily.    Historical Provider, MD   nitroglycerin (Nitrostat) 0.4 mg SL tablet Place 1 tablet (0.4 mg) under the tongue every 5 minutes if needed for chest pain.  Patient not taking: Reported on 6/24/2024 5/17/24 8/15/24  Armond Leyva MD   ONETOUCH ULTRASOFT LANCETS MISC Use as directed    Historical Provider, MD   atorvastatin (Lipitor) 80 mg tablet Take 1 tablet (80 mg) by mouth once daily. 2/15/24 6/19/24  Pretty Link MD   cholecalciferol (Vitamin D3) 25 MCG (1000 UT) tablet Take 1 tablet (1,000 Units) by mouth once daily.  6/19/24  Historical Provider, MD   enalapril (Vasotec) 20 mg tablet Take 1 tablet (20 mg) by mouth 2 times a day. 1/25/24 6/19/24  Pretty Link MD        PAT ROS     DASI Risk Score    No data to display       Caprini DVT Assessment    No data to display       Modified Frailty Index    No data to display       CHADS2 Stroke Risk  Current as of 48 minutes ago        N/A 3 to 100%: High Risk   2 to < 3%: Medium Risk   0 to < 2%: Low Risk     Last Change: N/A          This score determines the patient's risk of having a stroke if the patient has atrial fibrillation.        This score is not applicable to this patient. Components are not calculated.          Revised Cardiac Risk Index    No data to display       Apfel Simplified Score    No data to display       Risk Analysis Index Results This Encounter    No data found in the last 1 encounters.         Nurse Plan of Action: After Visit Summary (AVS) reviewed and patient verbalized good understanding of medications and NPO instructions.

## 2024-06-24 NOTE — CPM/PAT NURSE NOTE
CPM/PAT Nurse Note      Name: Rita Rubi (Rita Rubi)  /Age: 1955/69 y.o.       Past Medical History:   Diagnosis Date    Allergic rhinitis     Anemia     Anticoagulated     Plavix    Arthritis     left knee    Coronary artery disease involving native coronary artery of native heart, unspecified whether angina present     GERD (gastroesophageal reflux disease)     under control    Hyperlipidemia     Hypertension     Ischemic stroke (Multi) 2023    Cerebral infarction due to thrombosis of right middle cerebral artery    TYLER (obstructive sleep apnea)     does not use CPAP (stopped after her bariatric surgery)    Stuttering     has become worse since her stroke    Type 2 diabetes mellitus (Multi)     2024 A1C 5.7%       Past Surgical History:   Procedure Laterality Date    BREAST BIOPSY Left     benign    CARDIAC CATHETERIZATION N/A 2024    Procedure: Left Heart Cath, No LV;  Surgeon: Armond Leyva MD;  Location: [address] Cardiac Cath Lab;  Service: Cardiovascular;  Laterality: N/A;    CATARACT EXTRACTION Left 2016    CATARACT EXTRACTION Right 2013    CORONARY ANGIOPLASTY WITH STENT PLACEMENT  2023    FASCIOTOMY Left 2011    ENDOSCOPIC FASCIOTOMY PLANTAR    GASTRIC RESTRICTION SURGERY      MR HEAD ANGIO WO IV CONTRAST  09/10/2023    MR HEAD ANGIO WO IV CONTRAST 9/10/2023 [facility]    MR NECK ANGIO WO IV CONTRAST  09/10/2023    MR NECK ANGIO WO IV CONTRAST 9/10/2023 [facility]    TOTAL ABDOMINAL HYSTERECTOMY         Patient Sexual activity questions deferred to the physician.    Family History   Problem Relation Name Age of Onset    Heart attack Mother          CABG in mother in her 40s and sudden death at age 50    Esophageal cancer Father      Epilepsy Sister         Allergies   Allergen Reactions    House Dust Runny nose    Pollen Extracts Hives and Itching       Prior to Admission medications    Medication Sig Start Date End Date Taking? Authorizing Provider   alpha  tocopherol (Vitamin E) 268 mg (400 unit) capsule Take 1 capsule (400 Units) by mouth once daily.    Historical Provider, MD   amLODIPine (Norvasc) 5 mg tablet TAKE 1 TABLET BY MOUTH EVERY DAY 6/9/24   Pretty Link MD   aspirin 81 mg EC tablet Take 1 tablet (81 mg) by mouth once daily. 2/15/24   Pretty Link MD   atorvastatin (Lipitor) 80 mg tablet Take 1 tablet (80 mg) by mouth once daily at bedtime. 1/25/24   Pretty Link MD   chlorhexidine (Peridex) 0.12 % solution 15 ml swish and spit for 30 seconds night prior to surgery and morning of surgery 6/24/24   Maddie Sharma PA-C   clopidogrel (Plavix) 75 mg tablet TAKE 1 TABLET BY MOUTH EVERY DAY 6/9/24   Pretty Link MD   cyanocobalamin (Vitamin B-12) 250 mcg tablet Take 1 tablet (250 mcg) by mouth once daily.    Historical Provider, MD   docusate sodium (Colace) 100 mg capsule Take 1 capsule (100 mg) by mouth 2 times a day as needed for constipation. 12/8/23   Armond Leyva MD   empagliflozin (Jardiance) 10 mg Take 1 tablet (10 mg) by mouth once daily. 2/28/24 2/27/25  Pretty Link MD   enalapril (Vasotec) 20 mg tablet Take 1 tablet (20 mg) by mouth 2 times a day. 2/15/24 2/14/25  Pretty Link MD   fluticasone (Flonase) 50 mcg/actuation nasal spray 1-2 sprays QD  Patient taking differently: Administer 1 spray into each nostril if needed for allergies or rhinitis. 1-2 sprays QD 2/15/24   Pretty Link MD   furosemide (Lasix) 20 mg tablet TAKE 1 TABLET BY MOUTH EVERY DAY 6/9/24   Pretty Link MD   isosorbide mononitrate ER (Imdur) 30 mg 24 hr tablet Take 3 tablets (90 mg) by mouth once daily. As directed 5/17/24 8/15/24  Armond Leyva MD   metFORMIN (Glucophage) 500 mg tablet TAKE 1 TABLET BY MOUTH TWICE A DAY WITH MEALS 6/9/24   Pretty Link MD   metoprolol succinate XL (Toprol-XL) 25 mg 24 hr tablet Take 0.5 tablets (12.5 mg) by mouth once daily. 1/25/24    Pretty Link MD   mv,calcium,min/iron/folic/vitK (OPTISOURCE ORAL) Take 1 tablet by mouth once daily.    Historical Provider, MD   nitroglycerin (Nitrostat) 0.4 mg SL tablet Place 1 tablet (0.4 mg) under the tongue every 5 minutes if needed for chest pain.  Patient not taking: Reported on 6/24/2024 5/17/24 8/15/24  Armond Leyva MD   ONETOUCH ULTRASOFT LANCETS MISC Use as directed    Historical Provider, MD   atorvastatin (Lipitor) 80 mg tablet Take 1 tablet (80 mg) by mouth once daily. 2/15/24 6/19/24  Pretty Link MD   cholecalciferol (Vitamin D3) 25 MCG (1000 UT) tablet Take 1 tablet (1,000 Units) by mouth once daily.  6/19/24  Historical Provider, MD   enalapril (Vasotec) 20 mg tablet Take 1 tablet (20 mg) by mouth 2 times a day. 1/25/24 6/19/24  Pretty Link MD        PAT ROS     DASI Risk Score    No data to display       Caprini DVT Assessment    No data to display       Modified Frailty Index    No data to display       CHADS2 Stroke Risk  Current as of 48 minutes ago        N/A 3 to 100%: High Risk   2 to < 3%: Medium Risk   0 to < 2%: Low Risk     Last Change: N/A          This score determines the patient's risk of having a stroke if the patient has atrial fibrillation.        This score is not applicable to this patient. Components are not calculated.          Revised Cardiac Risk Index    No data to display       Apfel Simplified Score    No data to display       Risk Analysis Index Results This Encounter    No data found in the last 1 encounters.         Nurse Plan of Action: After Visit Summary (AVS) reviewed and patient verbalized good understanding of medications and NPO instructions.  Pre-op infection prevention measures:  CHG showers and mouthwash reviewed, understanding voiced.  CHG soap given and patient verbalized need to pick CHG mouthwash at their preferred local pharmacy.

## 2024-06-24 NOTE — CPM/PAT H&P
[facility] Evaluation       Name: Rita Rubi (Rita Rubi)  /Age: 1955/69 y.o.     In-Person       Chief Complaint: Coronary artery disease involving native coronary artery of native heart, unspecified whether angina present     HPI      Date of Consult: 24    Referring Provider: Dr. Kee     Surgery, Date, and Length: Coronary Artery Bypass Graft x3; LIMA; RADIAL; VEIN ; 24; 330 minutes      Rita Rubi  is a 69 year-old female who presents to the [facility] for perioperative risk assessment prior to surgery.  She had chest pain and a negative nuclear stress test.  She underwent cath by Dr. Elkins and was found to have multivessel CAD for which she was referred. Multivessel CAD including in stent re-stenosis of the circumflex and proximal LAD disease. Her EF is preserved and she does not have any other significant valvular disease. She is still endorsing chest pain and shortness of breath with exertion, made better with rest.   Patient has family history of premature CAD ( CABG in mother in her 40s and sudden death at age 50).     This note was created in part upon personal review of patient's medical records.      Patient is scheduled to have Coronary Artery Bypass Graft x3; LIMA; RADIAL; VEIN     Medical History  Past Medical History:   Diagnosis Date    Allergic rhinitis     Anemia     Anticoagulated     Plavix    Arthritis     left knee    Coronary artery disease involving native coronary artery of native heart, unspecified whether angina present     GERD (gastroesophageal reflux disease)     under control    Hyperlipidemia     Hypertension     Ischemic stroke (Multi) 2023    Cerebral infarction due to thrombosis of right middle cerebral artery    TYLER (obstructive sleep apnea)     does not use CPAP (stopped after her bariatric surgery)    Stuttering     has become worse since her stroke    Type 2 diabetes mellitus (Multi)     2024 A1C 5.7%        STOP BANG = +TYLER    Caprini =    5 ( age,  surgery, BMI>25)    Surgical History  Past Surgical History:   Procedure Laterality Date    BREAST BIOPSY Left     benign    CARDIAC CATHETERIZATION N/A 05/01/2024    Procedure: Left Heart Cath, No LV;  Surgeon: Armond Leyva MD;  Location: [address] Cardiac Cath Lab;  Service: Cardiovascular;  Laterality: N/A;    CATARACT EXTRACTION Left 11/02/2016    CATARACT EXTRACTION Right 12/23/2013    CORONARY ANGIOPLASTY WITH STENT PLACEMENT  03/29/2023    FASCIOTOMY Left 06/01/2011    ENDOSCOPIC FASCIOTOMY PLANTAR    GASTRIC RESTRICTION SURGERY      MR HEAD ANGIO WO IV CONTRAST  09/10/2023    MR HEAD ANGIO WO IV CONTRAST 9/10/2023 [facility]    MR NECK ANGIO WO IV CONTRAST  09/10/2023    MR NECK ANGIO WO IV CONTRAST 9/10/2023 [facility] MRI    TOTAL ABDOMINAL HYSTERECTOMY               Family history:  Family History   Problem Relation Name Age of Onset    Heart attack Mother          CABG in mother in her 40s and sudden death at age 50    Esophageal cancer Father      Epilepsy Sister          Social history:  Social History     Socioeconomic History    Marital status: Single     Spouse name: Not on file    Number of children: Not on file    Years of education: Not on file    Highest education level: Not on file   Occupational History    Not on file   Tobacco Use    Smoking status: Never     Passive exposure: Never    Smokeless tobacco: Never   Vaping Use    Vaping status: Never Used   Substance and Sexual Activity    Alcohol use: Not Currently    Drug use: Never    Sexual activity: Defer   Other Topics Concern    Not on file   Social History Narrative    Not on file     Social Determinants of Health     Financial Resource Strain: Not on file   Food Insecurity: Not on file   Transportation Needs: Not on file   Physical Activity: Not on file   Stress: Not on file   Social Connections: Not on file   Intimate Partner Violence: Not on file   Housing Stability: Not on file          Current Outpatient Medications:     alpha  tocopherol (Vitamin E) 268 mg (400 unit) capsule, Take 1 capsule (400 Units) by mouth once daily., Disp: , Rfl:     amLODIPine (Norvasc) 5 mg tablet, TAKE 1 TABLET BY MOUTH EVERY DAY, Disp: 30 tablet, Rfl: 1    aspirin 81 mg EC tablet, Take 1 tablet (81 mg) by mouth once daily., Disp: 90 tablet, Rfl: 1    atorvastatin (Lipitor) 80 mg tablet, Take 1 tablet (80 mg) by mouth once daily at bedtime., Disp: 90 tablet, Rfl: 1    clopidogrel (Plavix) 75 mg tablet, TAKE 1 TABLET BY MOUTH EVERY DAY, Disp: 30 tablet, Rfl: 1    cyanocobalamin (Vitamin B-12) 250 mcg tablet, Take 1 tablet (250 mcg) by mouth once daily., Disp: , Rfl:     docusate sodium (Colace) 100 mg capsule, Take 1 capsule (100 mg) by mouth 2 times a day as needed for constipation., Disp: 60 capsule, Rfl: 3    empagliflozin (Jardiance) 10 mg, Take 1 tablet (10 mg) by mouth once daily., Disp: 90 tablet, Rfl: 1    enalapril (Vasotec) 20 mg tablet, Take 1 tablet (20 mg) by mouth 2 times a day., Disp: 180 tablet, Rfl: 1    fluticasone (Flonase) 50 mcg/actuation nasal spray, 1-2 sprays QD (Patient taking differently: Administer 1 spray into each nostril if needed for allergies or rhinitis. 1-2 sprays QD), Disp: 16 g, Rfl: 5    furosemide (Lasix) 20 mg tablet, TAKE 1 TABLET BY MOUTH EVERY DAY, Disp: 30 tablet, Rfl: 1    isosorbide mononitrate ER (Imdur) 30 mg 24 hr tablet, Take 3 tablets (90 mg) by mouth once daily. As directed, Disp: 90 tablet, Rfl: 2    metFORMIN (Glucophage) 500 mg tablet, TAKE 1 TABLET BY MOUTH TWICE A DAY WITH MEALS, Disp: 60 tablet, Rfl: 1    metoprolol succinate XL (Toprol-XL) 25 mg 24 hr tablet, Take 0.5 tablets (12.5 mg) by mouth once daily., Disp: 90 tablet, Rfl: 1    ONETOUCH ULTRASOFT LANCETS MISC, Use as directed, Disp: , Rfl:     chlorhexidine (Peridex) 0.12 % solution, 15 ml swish and spit for 30 seconds night prior to surgery and morning of surgery, Disp: 473 mL, Rfl: 0    mv,calcium,min/iron/folic/vitK (OPTISOURCE ORAL), Take 1  "tablet by mouth once daily., Disp: , Rfl:     nitroglycerin (Nitrostat) 0.4 mg SL tablet, Place 1 tablet (0.4 mg) under the tongue every 5 minutes if needed for chest pain. (Patient not taking: Reported on 6/24/2024), Disp: 90 tablet, Rfl: 1       Visit Vitals  /65   Pulse 70   Temp 36.6 °C (97.9 °F)   Resp 18   Ht 1.69 m (5' 6.54\")   Wt 93 kg (205 lb 0.4 oz)   LMP  (LMP Unknown)   SpO2 99%   BMI 32.56 kg/m²   OB Status Postmenopausal   Smoking Status Never   BSA 2.09 m²        Review of Systems   Constitutional: Negative.    HENT: Negative.     Eyes: Negative.         Glasses   Respiratory: Negative.     Cardiovascular: Negative.         METS  previously 4-5 mile walks - decreased physical exertion at this time   Gastrointestinal: Negative.    Endocrine: Negative.    Genitourinary: Negative.    Musculoskeletal:  Positive for arthralgias (left knee).   Skin: Negative.    Allergic/Immunologic: Negative.    Neurological:  Positive for weakness (left side since CVA).   Hematological: Negative.    Psychiatric/Behavioral: Negative.          Physical Exam  Vitals reviewed.   Constitutional:       Appearance: Normal appearance.      Comments: +stutters   HENT:      Head: Normocephalic and atraumatic.      Right Ear: External ear normal.      Left Ear: External ear normal.      Nose: Nose normal.      Mouth/Throat:      Pharynx: Oropharynx is clear.   Eyes:      Extraocular Movements: Extraocular movements intact.      Conjunctiva/sclera: Conjunctivae normal.      Pupils: Pupils are equal, round, and reactive to light.   Cardiovascular:      Rate and Rhythm: Normal rate and regular rhythm.      Heart sounds: No murmur (grade 1/6 systolic) heard.  Pulmonary:      Effort: Pulmonary effort is normal.      Breath sounds: Normal breath sounds.   Abdominal:      Palpations: Abdomen is soft.   Musculoskeletal:         General: Normal range of motion.      Cervical back: Normal range of motion and neck supple.      Right " lower leg: Edema (minimal pedal) present.      Left lower leg: Edema (minimal pedal) present.   Skin:     General: Skin is warm and dry.   Neurological:      General: No focal deficit present.      Mental Status: She is alert and oriented to person, place, and time.   Psychiatric:         Mood and Affect: Mood normal.         Behavior: Behavior normal.          PAT AIRWAY:   Airway:     Mallampati::  II    Neck ROM::  Full    Lab Results   Component Value Date    WBC 5.8 06/24/2024    HGB 11.1 (L) 06/24/2024    HCT 32.3 (L) 06/24/2024    MCV 82 06/24/2024     06/24/2024      Lab Results   Component Value Date    GLUCOSE 110 (H) 06/24/2024    CALCIUM 10.0 06/24/2024     06/24/2024    K 4.2 06/24/2024    CO2 27 06/24/2024     06/24/2024    BUN 17 06/24/2024    CREATININE 0.94 06/24/2024      Lab Results   Component Value Date    ALT 12 06/24/2024    AST 14 06/24/2024    ALKPHOS 47 06/24/2024    BILITOT 0.7 06/24/2024      Lab Results   Component Value Date    CRP <0.10 06/24/2024      Protime  9.8 - 12.8 seconds 11.4 11.2 R, CM 11.6 R, CM   INR  0.9 - 1.1 1.0 1.0 1.0   aPTT  27 - 38 seconds 30       Lab Results   Component Value Date    HGBA1C 6.1 (H) 06/24/2024      EKG 6/24/24  NSR  Nonspecific T wave abnormality  67 BPM     Cardiac cath 5/1/24  Double vessel coronary artery disease with proximal left anterior descending involvement.     TTE 10/10/23  CONCLUSIONS:   1. Poorly visualized anatomical structures due to suboptimal image quality.   2. Left ventricular systolic function is normal with a 65% estimated ejection fraction.   3. Spectral Doppler shows an impaired relaxation pattern of left ventricular diastolic filling.   4. Mild aortic valve regurgitation.   5. Atrial septal aneurysm present.   6. Compared with study from 2/27/2023, no significant changea bubble study was performed on the current examination and within the limits of a technically challenging study it appears to be  negative.     HOLTER 10/10/23  Predominant rhythm sinus bradycardia to sinus tachycardia     CARDIAC CATH 3/29/23  CONCLUSIONS:  1. Moderate stenosis in the proximal LAD with physiologically non significant FFR.  2. Successful PCI of Proximal-Mid LCx with deployment of 3.0 x 18 mm Resolute Oynx BENITO.  3. Successful PCi of distal RCA into RPDA with deployment of 2.25 x 22 mm Resolute Oynx BENITO, post dilated with 2.5 NC distally and 3.0 NC proximally.    Carotid US 6/24/24  CONCLUSIONS:  Right Carotid: Findings are consistent with less than 50% stenosis of the right proximal internal carotid artery. The right proximal internal carotid artery is normal. Right external carotid artery appears patent with no evidence of stenosis. No evidence of hemodynamically significant stenosis of the right common carotid artery. The right vertebral artery is patent with antegrade flow. No evidence of hemodynamically significant stenosis in the right subclavian artery.  Left Carotid: Findings are consistent with less than 50% stenosis of the left proximal internal carotid artery. The left proximal internal carotid artery is normal. Left external carotid artery appears patent with no evidence of stenosis. No evidence of hemodynamically significant stenosis of the left common carotid artery. The left vertebral artery is patent with antegrade flow. No evidence of hemodynamically significant stenosis in the left subclavian artery.    CXR 6/24/24  IMPRESSION:  No acute cardiopulmonary process.      CT chest/abdomen 6/24/24  IMPRESSION:  1. Mild vascular calcification of the aortic valve plane and coronary  sinus. However, ascending thoracic aorta demonstrates no calcified  plaques.      2. No infiltrate in the chest      3. Postsurgical changes status post gastric sleeve procedure      4. Uncomplicated colonic diverticulosis in particular of the sigmoid  colon      RCRI  2  , 10 % Risk of MACE    Cardiac  CAD - plavix - hold 1 week prior to  surgery; enalapril HOLD 3 days prior to surgery; continue isosorbide and ASA DOS   HTN - furosemide HOLD 3 days prior to surgery; continue metoprolol and amlodipine DOS   HLD - continue atorvastatin DOS     Endocrinology  DM - jardiance  and metformin HOLD 3 days prior to surgery    Hematology       Patient instructed to ambulate as soon as possible postoperatively to decrease thromboembolic risk.      Initiate mechanical DVT prophylaxis as soon as possible and initiate chemical prophylaxis when deemed safe from a bleeding standpoint post surgery.       VTE prophylaxis per surgical team       Tests ordered in PAT: cbc, cmp, coag, crp, A1c, ua, t&s/t&x, mrsa, EKG   LABS REVIEWED: unremarkable    Risk assessment complete.  Patient is scheduled for a high surgical risk procedure.        Preoperative medication instructions were provided and reviewed with the patient.  Any additional testing or evaluation was explained to the patient.  Nothing by mouth instructions were discussed and patient's questions were answered prior to conclusion to this encounter.  Patient verbalized understanding of preoperative instructions given in preadmission testing; discharge instructions available in EMR.    This note was dictated by a speech recognition.  Minor errors may have been detected in a speech recognition.

## 2024-06-24 NOTE — H&P (VIEW-ONLY)
[facility] Evaluation       Name: Rita Rubi (iRta Rubi)  /Age: 1955/69 y.o.     In-Person       Chief Complaint: Coronary artery disease involving native coronary artery of native heart, unspecified whether angina present     HPI      Date of Consult: 24    Referring Provider: Dr. Kee     Surgery, Date, and Length: Coronary Artery Bypass Graft x3; LIMA; RADIAL; VEIN ; 24; 330 minutes      Rita Rubi  is a 69 year-old female who presents to the [facility] for perioperative risk assessment prior to surgery.  She had chest pain and a negative nuclear stress test.  She underwent cath by Dr. Elkins and was found to have multivessel CAD for which she was referred. Multivessel CAD including in stent re-stenosis of the circumflex and proximal LAD disease. Her EF is preserved and she does not have any other significant valvular disease. She is still endorsing chest pain and shortness of breath with exertion, made better with rest.   Patient has family history of premature CAD ( CABG in mother in her 40s and sudden death at age 50).     This note was created in part upon personal review of patient's medical records.      Patient is scheduled to have Coronary Artery Bypass Graft x3; LIMA; RADIAL; VEIN     Medical History  Past Medical History:   Diagnosis Date    Allergic rhinitis     Anemia     Anticoagulated     Plavix    Arthritis     left knee    Coronary artery disease involving native coronary artery of native heart, unspecified whether angina present     GERD (gastroesophageal reflux disease)     under control    Hyperlipidemia     Hypertension     Ischemic stroke (Multi) 2023    Cerebral infarction due to thrombosis of right middle cerebral artery    TYLER (obstructive sleep apnea)     does not use CPAP (stopped after her bariatric surgery)    Stuttering     has become worse since her stroke    Type 2 diabetes mellitus (Multi)     2024 A1C 5.7%        STOP BANG = +TYLER    Caprini =    5 ( age,  surgery, BMI>25)    Surgical History  Past Surgical History:   Procedure Laterality Date    BREAST BIOPSY Left     benign    CARDIAC CATHETERIZATION N/A 05/01/2024    Procedure: Left Heart Cath, No LV;  Surgeon: Armond Leyva MD;  Location: [address] Cardiac Cath Lab;  Service: Cardiovascular;  Laterality: N/A;    CATARACT EXTRACTION Left 11/02/2016    CATARACT EXTRACTION Right 12/23/2013    CORONARY ANGIOPLASTY WITH STENT PLACEMENT  03/29/2023    FASCIOTOMY Left 06/01/2011    ENDOSCOPIC FASCIOTOMY PLANTAR    GASTRIC RESTRICTION SURGERY      MR HEAD ANGIO WO IV CONTRAST  09/10/2023    MR HEAD ANGIO WO IV CONTRAST 9/10/2023 [facility]    MR NECK ANGIO WO IV CONTRAST  09/10/2023    MR NECK ANGIO WO IV CONTRAST 9/10/2023 [facility] MRI    TOTAL ABDOMINAL HYSTERECTOMY               Family history:  Family History   Problem Relation Name Age of Onset    Heart attack Mother          CABG in mother in her 40s and sudden death at age 50    Esophageal cancer Father      Epilepsy Sister          Social history:  Social History     Socioeconomic History    Marital status: Single     Spouse name: Not on file    Number of children: Not on file    Years of education: Not on file    Highest education level: Not on file   Occupational History    Not on file   Tobacco Use    Smoking status: Never     Passive exposure: Never    Smokeless tobacco: Never   Vaping Use    Vaping status: Never Used   Substance and Sexual Activity    Alcohol use: Not Currently    Drug use: Never    Sexual activity: Defer   Other Topics Concern    Not on file   Social History Narrative    Not on file     Social Determinants of Health     Financial Resource Strain: Not on file   Food Insecurity: Not on file   Transportation Needs: Not on file   Physical Activity: Not on file   Stress: Not on file   Social Connections: Not on file   Intimate Partner Violence: Not on file   Housing Stability: Not on file          Current Outpatient Medications:     alpha  tocopherol (Vitamin E) 268 mg (400 unit) capsule, Take 1 capsule (400 Units) by mouth once daily., Disp: , Rfl:     amLODIPine (Norvasc) 5 mg tablet, TAKE 1 TABLET BY MOUTH EVERY DAY, Disp: 30 tablet, Rfl: 1    aspirin 81 mg EC tablet, Take 1 tablet (81 mg) by mouth once daily., Disp: 90 tablet, Rfl: 1    atorvastatin (Lipitor) 80 mg tablet, Take 1 tablet (80 mg) by mouth once daily at bedtime., Disp: 90 tablet, Rfl: 1    clopidogrel (Plavix) 75 mg tablet, TAKE 1 TABLET BY MOUTH EVERY DAY, Disp: 30 tablet, Rfl: 1    cyanocobalamin (Vitamin B-12) 250 mcg tablet, Take 1 tablet (250 mcg) by mouth once daily., Disp: , Rfl:     docusate sodium (Colace) 100 mg capsule, Take 1 capsule (100 mg) by mouth 2 times a day as needed for constipation., Disp: 60 capsule, Rfl: 3    empagliflozin (Jardiance) 10 mg, Take 1 tablet (10 mg) by mouth once daily., Disp: 90 tablet, Rfl: 1    enalapril (Vasotec) 20 mg tablet, Take 1 tablet (20 mg) by mouth 2 times a day., Disp: 180 tablet, Rfl: 1    fluticasone (Flonase) 50 mcg/actuation nasal spray, 1-2 sprays QD (Patient taking differently: Administer 1 spray into each nostril if needed for allergies or rhinitis. 1-2 sprays QD), Disp: 16 g, Rfl: 5    furosemide (Lasix) 20 mg tablet, TAKE 1 TABLET BY MOUTH EVERY DAY, Disp: 30 tablet, Rfl: 1    isosorbide mononitrate ER (Imdur) 30 mg 24 hr tablet, Take 3 tablets (90 mg) by mouth once daily. As directed, Disp: 90 tablet, Rfl: 2    metFORMIN (Glucophage) 500 mg tablet, TAKE 1 TABLET BY MOUTH TWICE A DAY WITH MEALS, Disp: 60 tablet, Rfl: 1    metoprolol succinate XL (Toprol-XL) 25 mg 24 hr tablet, Take 0.5 tablets (12.5 mg) by mouth once daily., Disp: 90 tablet, Rfl: 1    ONETOUCH ULTRASOFT LANCETS MISC, Use as directed, Disp: , Rfl:     chlorhexidine (Peridex) 0.12 % solution, 15 ml swish and spit for 30 seconds night prior to surgery and morning of surgery, Disp: 473 mL, Rfl: 0    mv,calcium,min/iron/folic/vitK (OPTISOURCE ORAL), Take 1  "tablet by mouth once daily., Disp: , Rfl:     nitroglycerin (Nitrostat) 0.4 mg SL tablet, Place 1 tablet (0.4 mg) under the tongue every 5 minutes if needed for chest pain. (Patient not taking: Reported on 6/24/2024), Disp: 90 tablet, Rfl: 1       Visit Vitals  /65   Pulse 70   Temp 36.6 °C (97.9 °F)   Resp 18   Ht 1.69 m (5' 6.54\")   Wt 93 kg (205 lb 0.4 oz)   LMP  (LMP Unknown)   SpO2 99%   BMI 32.56 kg/m²   OB Status Postmenopausal   Smoking Status Never   BSA 2.09 m²        Review of Systems   Constitutional: Negative.    HENT: Negative.     Eyes: Negative.         Glasses   Respiratory: Negative.     Cardiovascular: Negative.         METS  previously 4-5 mile walks - decreased physical exertion at this time   Gastrointestinal: Negative.    Endocrine: Negative.    Genitourinary: Negative.    Musculoskeletal:  Positive for arthralgias (left knee).   Skin: Negative.    Allergic/Immunologic: Negative.    Neurological:  Positive for weakness (left side since CVA).   Hematological: Negative.    Psychiatric/Behavioral: Negative.          Physical Exam  Vitals reviewed.   Constitutional:       Appearance: Normal appearance.      Comments: +stutters   HENT:      Head: Normocephalic and atraumatic.      Right Ear: External ear normal.      Left Ear: External ear normal.      Nose: Nose normal.      Mouth/Throat:      Pharynx: Oropharynx is clear.   Eyes:      Extraocular Movements: Extraocular movements intact.      Conjunctiva/sclera: Conjunctivae normal.      Pupils: Pupils are equal, round, and reactive to light.   Cardiovascular:      Rate and Rhythm: Normal rate and regular rhythm.      Heart sounds: No murmur (grade 1/6 systolic) heard.  Pulmonary:      Effort: Pulmonary effort is normal.      Breath sounds: Normal breath sounds.   Abdominal:      Palpations: Abdomen is soft.   Musculoskeletal:         General: Normal range of motion.      Cervical back: Normal range of motion and neck supple.      Right " lower leg: Edema (minimal pedal) present.      Left lower leg: Edema (minimal pedal) present.   Skin:     General: Skin is warm and dry.   Neurological:      General: No focal deficit present.      Mental Status: She is alert and oriented to person, place, and time.   Psychiatric:         Mood and Affect: Mood normal.         Behavior: Behavior normal.          PAT AIRWAY:   Airway:     Mallampati::  II    Neck ROM::  Full    Lab Results   Component Value Date    WBC 5.8 06/24/2024    HGB 11.1 (L) 06/24/2024    HCT 32.3 (L) 06/24/2024    MCV 82 06/24/2024     06/24/2024      Lab Results   Component Value Date    GLUCOSE 110 (H) 06/24/2024    CALCIUM 10.0 06/24/2024     06/24/2024    K 4.2 06/24/2024    CO2 27 06/24/2024     06/24/2024    BUN 17 06/24/2024    CREATININE 0.94 06/24/2024      Lab Results   Component Value Date    ALT 12 06/24/2024    AST 14 06/24/2024    ALKPHOS 47 06/24/2024    BILITOT 0.7 06/24/2024      Lab Results   Component Value Date    CRP <0.10 06/24/2024      Protime  9.8 - 12.8 seconds 11.4 11.2 R, CM 11.6 R, CM   INR  0.9 - 1.1 1.0 1.0 1.0   aPTT  27 - 38 seconds 30       Lab Results   Component Value Date    HGBA1C 6.1 (H) 06/24/2024      EKG 6/24/24  NSR  Nonspecific T wave abnormality  67 BPM     Cardiac cath 5/1/24  Double vessel coronary artery disease with proximal left anterior descending involvement.     TTE 10/10/23  CONCLUSIONS:   1. Poorly visualized anatomical structures due to suboptimal image quality.   2. Left ventricular systolic function is normal with a 65% estimated ejection fraction.   3. Spectral Doppler shows an impaired relaxation pattern of left ventricular diastolic filling.   4. Mild aortic valve regurgitation.   5. Atrial septal aneurysm present.   6. Compared with study from 2/27/2023, no significant changea bubble study was performed on the current examination and within the limits of a technically challenging study it appears to be  negative.     HOLTER 10/10/23  Predominant rhythm sinus bradycardia to sinus tachycardia     CARDIAC CATH 3/29/23  CONCLUSIONS:  1. Moderate stenosis in the proximal LAD with physiologically non significant FFR.  2. Successful PCI of Proximal-Mid LCx with deployment of 3.0 x 18 mm Resolute Oynx BENITO.  3. Successful PCi of distal RCA into RPDA with deployment of 2.25 x 22 mm Resolute Oynx BENITO, post dilated with 2.5 NC distally and 3.0 NC proximally.    Carotid US 6/24/24  CONCLUSIONS:  Right Carotid: Findings are consistent with less than 50% stenosis of the right proximal internal carotid artery. The right proximal internal carotid artery is normal. Right external carotid artery appears patent with no evidence of stenosis. No evidence of hemodynamically significant stenosis of the right common carotid artery. The right vertebral artery is patent with antegrade flow. No evidence of hemodynamically significant stenosis in the right subclavian artery.  Left Carotid: Findings are consistent with less than 50% stenosis of the left proximal internal carotid artery. The left proximal internal carotid artery is normal. Left external carotid artery appears patent with no evidence of stenosis. No evidence of hemodynamically significant stenosis of the left common carotid artery. The left vertebral artery is patent with antegrade flow. No evidence of hemodynamically significant stenosis in the left subclavian artery.    CXR 6/24/24  IMPRESSION:  No acute cardiopulmonary process.      CT chest/abdomen 6/24/24  IMPRESSION:  1. Mild vascular calcification of the aortic valve plane and coronary  sinus. However, ascending thoracic aorta demonstrates no calcified  plaques.      2. No infiltrate in the chest      3. Postsurgical changes status post gastric sleeve procedure      4. Uncomplicated colonic diverticulosis in particular of the sigmoid  colon      RCRI  2  , 10 % Risk of MACE    Cardiac  CAD - plavix - hold 1 week prior to  surgery; enalapril HOLD 3 days prior to surgery; continue isosorbide and ASA DOS   HTN - furosemide HOLD 3 days prior to surgery; continue metoprolol and amlodipine DOS   HLD - continue atorvastatin DOS     Endocrinology  DM - jardiance  and metformin HOLD 3 days prior to surgery    Hematology       Patient instructed to ambulate as soon as possible postoperatively to decrease thromboembolic risk.      Initiate mechanical DVT prophylaxis as soon as possible and initiate chemical prophylaxis when deemed safe from a bleeding standpoint post surgery.       VTE prophylaxis per surgical team       Tests ordered in PAT: cbc, cmp, coag, crp, A1c, ua, t&s/t&x, mrsa, EKG   LABS REVIEWED: unremarkable    Risk assessment complete.  Patient is scheduled for a high surgical risk procedure.        Preoperative medication instructions were provided and reviewed with the patient.  Any additional testing or evaluation was explained to the patient.  Nothing by mouth instructions were discussed and patient's questions were answered prior to conclusion to this encounter.  Patient verbalized understanding of preoperative instructions given in preadmission testing; discharge instructions available in EMR.    This note was dictated by a speech recognition.  Minor errors may have been detected in a speech recognition.

## 2024-06-25 LAB
ATRIAL RATE: 67 BPM
P AXIS: 29 DEGREES
P OFFSET: 191 MS
P ONSET: 136 MS
PR INTERVAL: 174 MS
Q ONSET: 223 MS
QRS COUNT: 11 BEATS
QRS DURATION: 78 MS
QT INTERVAL: 390 MS
QTC CALCULATION(BAZETT): 412 MS
QTC FREDERICIA: 404 MS
R AXIS: -5 DEGREES
T AXIS: 29 DEGREES
T OFFSET: 418 MS
VENTRICULAR RATE: 67 BPM

## 2024-06-26 ENCOUNTER — TELEPHONE (OUTPATIENT)
Dept: PRIMARY CARE | Facility: CLINIC | Age: 69
End: 2024-06-26
Payer: COMMERCIAL

## 2024-06-27 DIAGNOSIS — R69 DIAGNOSIS UNKNOWN: ICD-10-CM

## 2024-06-27 DIAGNOSIS — A49.9 BACTERIAL URINARY INFECTION: Primary | ICD-10-CM

## 2024-06-27 DIAGNOSIS — N39.0 UTI (URINARY TRACT INFECTION): ICD-10-CM

## 2024-06-27 DIAGNOSIS — Z01.818 PRE-OP TESTING: ICD-10-CM

## 2024-06-27 DIAGNOSIS — Z01.818 PREOP TESTING: Primary | ICD-10-CM

## 2024-06-27 DIAGNOSIS — I25.10 CORONARY ARTERY DISEASE INVOLVING NATIVE CORONARY ARTERY OF NATIVE HEART, UNSPECIFIED WHETHER ANGINA PRESENT: ICD-10-CM

## 2024-06-27 DIAGNOSIS — N39.0 BACTERIAL URINARY INFECTION: Primary | ICD-10-CM

## 2024-06-27 LAB
BACTERIA UR CULT: NORMAL
STAPHYLOCOCCUS SPEC CULT: NORMAL

## 2024-07-01 ENCOUNTER — TELEPHONE (OUTPATIENT)
Dept: PRIMARY CARE | Facility: CLINIC | Age: 69
End: 2024-07-01
Payer: COMMERCIAL

## 2024-07-01 ENCOUNTER — ANESTHESIA EVENT (OUTPATIENT)
Dept: OPERATING ROOM | Facility: HOSPITAL | Age: 69
End: 2024-07-01
Payer: MEDICARE

## 2024-07-01 SDOH — HEALTH STABILITY: MENTAL HEALTH: CURRENT SMOKER: 0

## 2024-07-01 NOTE — ANESTHESIA PREPROCEDURE EVALUATION
Patient: Rita Rubi    Procedure Information       Date/Time: 07/02/24 0730    Procedure: Coronary Artery Bypass Graft x3; LIMA; RADIAL; VEIN (Chest)    Location: [place] [address] / [facility] OR    Surgeons: Franci Kee MD            Relevant Problems   Cardiac   (+) Atypical chest pain   (+) Benign essential hypertension   (+) Coronary artery disease involving native coronary artery of native heart   (+) Coronary artery disease involving native coronary artery with other form of angina pectoris, unspecified whether native or transplanted heart (CMS-HCC)   (+) HTN (hypertension)   (+) Hypercholesteremia   (+) Hyperlipidemia   (+) Primary hypertension      Pulmonary   (+) TYLER (obstructive sleep apnea)      Neuro   (+) Acute ischemic stroke (Multi)   (+) CVA (cerebral vascular accident) (Multi)   (+) Ischemic stroke (Multi)      GI   (+) Dysphagia      Endocrine   (+) Diabetes mellitus type 2 in nonobese (Multi)   (+) Obesity      Hematology   (+) Anemia       Clinical information reviewed:                    Past Medical History:   Diagnosis Date    Allergic rhinitis     Anemia     Anticoagulated     Plavix    Arthritis     left knee    Coronary artery disease involving native coronary artery of native heart, unspecified whether angina present     GERD (gastroesophageal reflux disease)     under control    Hyperlipidemia     Hypertension     Ischemic stroke (Multi) 09/2023    Cerebral infarction due to thrombosis of right middle cerebral artery    TYLER (obstructive sleep apnea)     does not use CPAP (stopped after her bariatric surgery)    Stuttering     has become worse since her stroke    Type 2 diabetes mellitus (Multi)     1/26/2024 A1C 5.7%      Past Surgical History:   Procedure Laterality Date    BREAST BIOPSY Left     benign    CARDIAC CATHETERIZATION N/A 05/01/2024    Procedure: Left Heart Cath, No LV;  Surgeon: Armond Leyva MD;  Location: [address] Cardiac Cath Lab;  Service: Cardiovascular;   Laterality: N/A;    CATARACT EXTRACTION Left 11/02/2016    CATARACT EXTRACTION Right 12/23/2013    CORONARY ANGIOPLASTY WITH STENT PLACEMENT  03/29/2023    FASCIOTOMY Left 06/01/2011    ENDOSCOPIC FASCIOTOMY PLANTAR    GASTRIC RESTRICTION SURGERY      MR HEAD ANGIO WO IV CONTRAST  09/10/2023    MR HEAD ANGIO WO IV CONTRAST 9/10/2023 [facility]    MR NECK ANGIO WO IV CONTRAST  09/10/2023    MR NECK ANGIO WO IV CONTRAST 9/10/2023 [facility] MRI    TOTAL ABDOMINAL HYSTERECTOMY       Social History     Tobacco Use    Smoking status: Never     Passive exposure: Never    Smokeless tobacco: Never   Vaping Use    Vaping status: Never Used   Substance Use Topics    Alcohol use: Not Currently    Drug use: Never      Current Outpatient Medications   Medication Instructions    alpha tocopherol (VITAMIN E) 400 Units, oral, Daily    amLODIPine (NORVASC) 5 mg, oral, Daily    aspirin 81 mg, oral, Daily    atorvastatin (LIPITOR) 80 mg, oral, Nightly    chlorhexidine (Peridex) 0.12 % solution 15 ml swish and spit for 30 seconds night prior to surgery and morning of surgery    clopidogrel (Plavix) 75 mg tablet TAKE 1 TABLET BY MOUTH EVERY DAY    cyanocobalamin (VITAMIN B-12) 250 mcg, oral, Daily    docusate sodium (COLACE) 100 mg, oral, 2 times daily PRN    empagliflozin (JARDIANCE) 10 mg, oral, Daily    enalapril (VASOTEC) 20 mg, oral, 2 times daily    fluticasone (Flonase) 50 mcg/actuation nasal spray 1-2 sprays QD    furosemide (LASIX) 20 mg, oral, Daily    isosorbide mononitrate ER (IMDUR) 90 mg, oral, Daily, As directed    metFORMIN (GLUCOPHAGE) 500 mg, oral, 2 times daily (morning and late afternoon)    metoprolol succinate XL (TOPROL-XL) 12.5 mg, oral, Daily    mv,calcium,min/iron/folic/vitK (OPTISOURCE ORAL) 1 tablet, oral, Daily    nitroglycerin (NITROSTAT) 0.4 mg, sublingual, Every 5 min PRN    ONETOUCH ULTRASOFT LANCETS MISC miscellaneous, Use as directed      Allergies   Allergen Reactions    House Dust Runny nose    Pollen Extracts  "Hives and Itching        Chemistry    Lab Results   Component Value Date/Time     06/24/2024 1152    K 4.2 06/24/2024 1152     06/24/2024 1152    CO2 27 06/24/2024 1152    BUN 17 06/24/2024 1152    CREATININE 0.94 06/24/2024 1152    Lab Results   Component Value Date/Time    CALCIUM 10.0 06/24/2024 1152    ALKPHOS 47 06/24/2024 1152    AST 14 06/24/2024 1152    ALT 12 06/24/2024 1152    BILITOT 0.7 06/24/2024 1152          Lab Results   Component Value Date    HGBA1C 6.1 (H) 06/24/2024     Lab Results   Component Value Date/Time    WBC 5.8 06/24/2024 1152    HGB 11.1 (L) 06/24/2024 1152    HCT 32.3 (L) 06/24/2024 1152     06/24/2024 1152     Lab Results   Component Value Date/Time    PROTIME 11.4 06/24/2024 1152    INR 1.0 06/24/2024 1152     No results found for: \"ABORH\"    No results found for this or any previous visit from the past 1095 days.       Visit Vitals  LMP  (LMP Unknown)   OB Status Postmenopausal   Smoking Status Never     No data recorded     Physical Exam    Airway  Mallampati: II  TM distance: >3 FB  Neck ROM: full     Cardiovascular - normal exam     Dental    Pulmonary - normal exam     Abdominal - normal exam              Anesthesia Plan    History of general anesthesia?: yes  History of complications of general anesthesia?: no    ASA 3     general     The patient is not a current smoker.    intravenous induction   Postoperative administration of opioids is intended.  Anesthetic plan and risks discussed with patient.  Use of blood products discussed with patient who.    Plan discussed with CAA.        "

## 2024-07-02 ENCOUNTER — HOSPITAL ENCOUNTER (INPATIENT)
Facility: HOSPITAL | Age: 69
DRG: 235 | End: 2024-07-02
Attending: STUDENT IN AN ORGANIZED HEALTH CARE EDUCATION/TRAINING PROGRAM | Admitting: STUDENT IN AN ORGANIZED HEALTH CARE EDUCATION/TRAINING PROGRAM
Payer: MEDICARE

## 2024-07-02 ENCOUNTER — HOSPITAL ENCOUNTER (OUTPATIENT)
Dept: CARDIOLOGY | Facility: HOSPITAL | Age: 69
Discharge: HOME | End: 2024-07-02
Payer: MEDICARE

## 2024-07-02 ENCOUNTER — APPOINTMENT (OUTPATIENT)
Dept: CARDIOLOGY | Facility: HOSPITAL | Age: 69
DRG: 235 | End: 2024-07-02
Payer: MEDICARE

## 2024-07-02 ENCOUNTER — TELEPHONE (OUTPATIENT)
Dept: PRIMARY CARE | Facility: CLINIC | Age: 69
End: 2024-07-02
Payer: COMMERCIAL

## 2024-07-02 ENCOUNTER — ANESTHESIA (OUTPATIENT)
Dept: OPERATING ROOM | Facility: HOSPITAL | Age: 69
End: 2024-07-02
Payer: MEDICARE

## 2024-07-02 ENCOUNTER — APPOINTMENT (OUTPATIENT)
Dept: RADIOLOGY | Facility: HOSPITAL | Age: 69
DRG: 235 | End: 2024-07-02
Payer: MEDICARE

## 2024-07-02 DIAGNOSIS — I10 PRIMARY HYPERTENSION: ICD-10-CM

## 2024-07-02 DIAGNOSIS — I25.10 CORONARY ARTERY DISEASE INVOLVING NATIVE CORONARY ARTERY OF NATIVE HEART, UNSPECIFIED WHETHER ANGINA PRESENT: ICD-10-CM

## 2024-07-02 DIAGNOSIS — I25.110 CORONARY ARTERY DISEASE INVOLVING NATIVE CORONARY ARTERY OF NATIVE HEART WITH UNSTABLE ANGINA PECTORIS (MULTI): Primary | ICD-10-CM

## 2024-07-02 DIAGNOSIS — Z95.1 S/P CABG X 3: ICD-10-CM

## 2024-07-02 LAB
ABO GROUP (TYPE) IN BLOOD: NORMAL
ALBUMIN SERPL BCP-MCNC: 3.3 G/DL (ref 3.4–5)
ANION GAP BLDA CALCULATED.4IONS-SCNC: 10 MMO/L (ref 10–25)
ANION GAP BLDA CALCULATED.4IONS-SCNC: 10 MMO/L (ref 10–25)
ANION GAP BLDA CALCULATED.4IONS-SCNC: 6 MMO/L (ref 10–25)
ANION GAP BLDA CALCULATED.4IONS-SCNC: 7 MMO/L (ref 10–25)
ANION GAP BLDA CALCULATED.4IONS-SCNC: 7 MMO/L (ref 10–25)
ANION GAP BLDA CALCULATED.4IONS-SCNC: 8 MMO/L (ref 10–25)
ANION GAP BLDA CALCULATED.4IONS-SCNC: 9 MMO/L (ref 10–25)
ANION GAP BLDA CALCULATED.4IONS-SCNC: 9 MMO/L (ref 10–25)
ANION GAP BLDV CALCULATED.4IONS-SCNC: 7 MMOL/L (ref 10–25)
ANION GAP SERPL CALC-SCNC: 12 MMOL/L (ref 10–20)
APTT PPP: 26 SECONDS (ref 27–38)
BASE EXCESS BLDA CALC-SCNC: -1 MMOL/L (ref -2–3)
BASE EXCESS BLDA CALC-SCNC: 0.7 MMOL/L (ref -2–3)
BASE EXCESS BLDA CALC-SCNC: 0.7 MMOL/L (ref -2–3)
BASE EXCESS BLDA CALC-SCNC: 1.2 MMOL/L (ref -2–3)
BASE EXCESS BLDA CALC-SCNC: 1.3 MMOL/L (ref -2–3)
BASE EXCESS BLDA CALC-SCNC: 2 MMOL/L (ref -2–3)
BASE EXCESS BLDA CALC-SCNC: 2.2 MMOL/L (ref -2–3)
BASE EXCESS BLDA CALC-SCNC: 2.2 MMOL/L (ref -2–3)
BASE EXCESS BLDA CALC-SCNC: 2.7 MMOL/L (ref -2–3)
BASE EXCESS BLDA CALC-SCNC: 3.3 MMOL/L (ref -2–3)
BASE EXCESS BLDV CALC-SCNC: 4.7 MMOL/L (ref -2–3)
BODY TEMPERATURE: 37 DEGREES CELSIUS
BUN SERPL-MCNC: 16 MG/DL (ref 6–23)
CA-I BLDA-SCNC: 1.19 MMOL/L (ref 1.1–1.33)
CA-I BLDA-SCNC: 1.21 MMOL/L (ref 1.1–1.33)
CA-I BLDA-SCNC: 1.22 MMOL/L (ref 1.1–1.33)
CA-I BLDA-SCNC: 1.26 MMOL/L (ref 1.1–1.33)
CA-I BLDA-SCNC: 1.27 MMOL/L (ref 1.1–1.33)
CA-I BLDA-SCNC: 1.29 MMOL/L (ref 1.1–1.33)
CA-I BLDA-SCNC: 1.33 MMOL/L (ref 1.1–1.33)
CA-I BLDA-SCNC: 1.34 MMOL/L (ref 1.1–1.33)
CA-I BLDV-SCNC: 1.19 MMOL/L (ref 1.1–1.33)
CALCIUM SERPL-MCNC: 8.9 MG/DL (ref 8.6–10.3)
CHLORIDE BLDA-SCNC: 106 MMOL/L (ref 98–107)
CHLORIDE BLDA-SCNC: 106 MMOL/L (ref 98–107)
CHLORIDE BLDA-SCNC: 108 MMOL/L (ref 98–107)
CHLORIDE BLDA-SCNC: 109 MMOL/L (ref 98–107)
CHLORIDE BLDA-SCNC: 110 MMOL/L (ref 98–107)
CHLORIDE BLDA-SCNC: 110 MMOL/L (ref 98–107)
CHLORIDE BLDA-SCNC: 111 MMOL/L (ref 98–107)
CHLORIDE BLDV-SCNC: 106 MMOL/L (ref 98–107)
CHLORIDE SERPL-SCNC: 108 MMOL/L (ref 98–107)
CO2 SERPL-SCNC: 27 MMOL/L (ref 21–32)
COHGB MFR BLDA: 0.7 %
COHGB MFR BLDA: 0.9 %
COHGB MFR BLDA: 1 %
COHGB MFR BLDA: 1.2 %
COHGB MFR BLDA: 1.3 %
COHGB MFR BLDA: 1.3 %
COHGB MFR BLDA: 1.4 %
COHGB MFR BLDA: 1.4 %
COHGB MFR BLDA: 1.5 %
COHGB MFR BLDV: 1.7 %
CREAT SERPL-MCNC: 0.99 MG/DL (ref 0.5–1.05)
DO-HGB MFR BLDA: 0 % (ref 0–5)
DO-HGB MFR BLDA: 0.1 % (ref 0–5)
DO-HGB MFR BLDA: 0.1 % (ref 0–5)
DO-HGB MFR BLDA: 0.2 % (ref 0–5)
DO-HGB MFR BLDA: 0.3 % (ref 0–5)
DO-HGB MFR BLDA: 0.4 % (ref 0–5)
DO-HGB MFR BLDA: 0.7 % (ref 0–5)
EGFRCR SERPLBLD CKD-EPI 2021: 61 ML/MIN/1.73M*2
EJECTION FRACTION: 65 %
ERYTHROCYTE [DISTWIDTH] IN BLOOD BY AUTOMATED COUNT: 16.9 % (ref 11.5–14.5)
FIBRINOGEN PPP-MCNC: 164 MG/DL (ref 200–400)
GLUCOSE BLD MANUAL STRIP-MCNC: 103 MG/DL (ref 74–99)
GLUCOSE BLD MANUAL STRIP-MCNC: 155 MG/DL (ref 74–99)
GLUCOSE BLD MANUAL STRIP-MCNC: 203 MG/DL (ref 74–99)
GLUCOSE BLD MANUAL STRIP-MCNC: 207 MG/DL (ref 74–99)
GLUCOSE BLD MANUAL STRIP-MCNC: 235 MG/DL (ref 74–99)
GLUCOSE BLDA-MCNC: 108 MG/DL (ref 74–99)
GLUCOSE BLDA-MCNC: 117 MG/DL (ref 74–99)
GLUCOSE BLDA-MCNC: 137 MG/DL (ref 74–99)
GLUCOSE BLDA-MCNC: 138 MG/DL (ref 74–99)
GLUCOSE BLDA-MCNC: 143 MG/DL (ref 74–99)
GLUCOSE BLDA-MCNC: 163 MG/DL (ref 74–99)
GLUCOSE BLDA-MCNC: 201 MG/DL (ref 74–99)
GLUCOSE BLDA-MCNC: 208 MG/DL (ref 74–99)
GLUCOSE BLDA-MCNC: 211 MG/DL (ref 74–99)
GLUCOSE BLDA-MCNC: 218 MG/DL (ref 74–99)
GLUCOSE BLDV-MCNC: 140 MG/DL (ref 74–99)
GLUCOSE SERPL-MCNC: 183 MG/DL (ref 74–99)
HCO3 BLDA-SCNC: 24.3 MMOL/L (ref 22–26)
HCO3 BLDA-SCNC: 25.9 MMOL/L (ref 22–26)
HCO3 BLDA-SCNC: 26 MMOL/L (ref 22–26)
HCO3 BLDA-SCNC: 26.5 MMOL/L (ref 22–26)
HCO3 BLDA-SCNC: 26.5 MMOL/L (ref 22–26)
HCO3 BLDA-SCNC: 27.1 MMOL/L (ref 22–26)
HCO3 BLDA-SCNC: 28.5 MMOL/L (ref 22–26)
HCO3 BLDA-SCNC: 28.6 MMOL/L (ref 22–26)
HCO3 BLDV-SCNC: 30.6 MMOL/L (ref 22–26)
HCT VFR BLD AUTO: 25.1 % (ref 36–46)
HCT VFR BLD EST: 21 % (ref 36–46)
HCT VFR BLD EST: 23 % (ref 36–46)
HCT VFR BLD EST: 23 % (ref 36–46)
HCT VFR BLD EST: 25 % (ref 36–46)
HCT VFR BLD EST: 26 % (ref 36–46)
HCT VFR BLD EST: 27 % (ref 36–46)
HCT VFR BLD EST: 28 % (ref 36–46)
HCT VFR BLD EST: 28 % (ref 36–46)
HCT VFR BLD EST: 29 % (ref 36–46)
HCT VFR BLD EST: 29 % (ref 36–46)
HCT VFR BLD EST: 31 % (ref 36–46)
HGB BLD-MCNC: 9 G/DL (ref 12–16)
HGB BLDA-MCNC: 10.4 G/DL (ref 12–16)
HGB BLDA-MCNC: 10.4 G/DL (ref 12–16)
HGB BLDA-MCNC: 7 G/DL (ref 12–16)
HGB BLDA-MCNC: 7 G/DL (ref 12–16)
HGB BLDA-MCNC: 7.7 G/DL (ref 12–16)
HGB BLDA-MCNC: 7.7 G/DL (ref 12–16)
HGB BLDA-MCNC: 8.4 G/DL (ref 12–16)
HGB BLDA-MCNC: 8.4 G/DL (ref 12–16)
HGB BLDA-MCNC: 8.5 G/DL (ref 12–16)
HGB BLDA-MCNC: 8.5 G/DL (ref 12–16)
HGB BLDA-MCNC: 9.1 G/DL (ref 12–16)
HGB BLDA-MCNC: 9.1 G/DL (ref 12–16)
HGB BLDA-MCNC: 9.2 G/DL (ref 12–16)
HGB BLDA-MCNC: 9.3 G/DL (ref 12–16)
HGB BLDA-MCNC: 9.3 G/DL (ref 12–16)
HGB BLDA-MCNC: 9.6 G/DL (ref 12–16)
HGB BLDA-MCNC: 9.6 G/DL (ref 12–16)
HGB BLDA-MCNC: 9.8 G/DL (ref 12–16)
HGB BLDA-MCNC: 9.8 G/DL (ref 12–16)
HGB BLDV-MCNC: 7.7 G/DL (ref 12–16)
INHALED O2 CONCENTRATION: 100 %
INHALED O2 CONCENTRATION: 60 %
INHALED O2 CONCENTRATION: 70 %
INHALED O2 CONCENTRATION: 80 %
INHALED O2 CONCENTRATION: 80 %
INR PPP: 1.2 (ref 0.9–1.1)
LACTATE BLDA-SCNC: 0.7 MMOL/L (ref 0.4–2)
LACTATE BLDA-SCNC: 1 MMOL/L (ref 0.4–2)
LACTATE BLDA-SCNC: 1.2 MMOL/L (ref 0.4–2)
LACTATE BLDA-SCNC: 1.4 MMOL/L (ref 0.4–2)
LACTATE BLDA-SCNC: 1.5 MMOL/L (ref 0.4–2)
LACTATE BLDA-SCNC: 1.7 MMOL/L (ref 0.4–2)
LACTATE BLDA-SCNC: 1.7 MMOL/L (ref 0.4–2)
LACTATE BLDA-SCNC: 1.8 MMOL/L (ref 0.4–2)
LACTATE BLDA-SCNC: 2.2 MMOL/L (ref 0.4–2)
LACTATE BLDA-SCNC: 2.7 MMOL/L (ref 0.4–2)
LACTATE BLDV-SCNC: 1.7 MMOL/L (ref 0.4–2)
LEFT VENTRICLE INTERNAL DIMENSION DIASTOLE: 3.83 CM (ref 3.5–6)
LEFT VENTRICULAR OUTFLOW TRACT DIAMETER: 1.88 CM
MAGNESIUM SERPL-MCNC: 3.1 MG/DL (ref 1.6–2.4)
MCH RBC QN AUTO: 28.5 PG (ref 26–34)
MCHC RBC AUTO-ENTMCNC: 35.9 G/DL (ref 32–36)
MCV RBC AUTO: 79 FL (ref 80–100)
METHGB MFR BLDA: 0 % (ref 0–1.5)
METHGB MFR BLDA: 0.1 % (ref 0–1.5)
METHGB MFR BLDA: 0.2 % (ref 0–1.5)
METHGB MFR BLDA: 0.3 % (ref 0–1.5)
METHGB MFR BLDA: 0.4 % (ref 0–1.5)
METHGB MFR BLDA: 0.5 % (ref 0–1.5)
METHGB MFR BLDA: 0.7 % (ref 0–1.5)
METHGB MFR BLDV: 0 % (ref 0–1.5)
NRBC BLD-RTO: 0 /100 WBCS (ref 0–0)
OXYHGB MFR BLDA: 97 % (ref 94–98)
OXYHGB MFR BLDA: 97.6 % (ref 94–98)
OXYHGB MFR BLDA: 97.6 % (ref 94–98)
OXYHGB MFR BLDA: 98.1 % (ref 94–98)
OXYHGB MFR BLDA: 98.1 % (ref 94–98)
OXYHGB MFR BLDA: 98.2 % (ref 94–98)
OXYHGB MFR BLDA: 98.2 % (ref 94–98)
OXYHGB MFR BLDA: 98.3 % (ref 94–98)
OXYHGB MFR BLDA: 98.3 % (ref 94–98)
OXYHGB MFR BLDA: 98.4 % (ref 94–98)
OXYHGB MFR BLDA: 98.5 % (ref 94–98)
OXYHGB MFR BLDA: 98.5 % (ref 94–98)
OXYHGB MFR BLDA: 98.6 % (ref 94–98)
OXYHGB MFR BLDA: 98.6 % (ref 94–98)
OXYHGB MFR BLDA: 98.7 % (ref 94–98)
OXYHGB MFR BLDA: 98.7 % (ref 94–98)
OXYHGB MFR BLDV: 85.3 % (ref 45–75)
PCO2 BLDA: 34 MM HG (ref 38–42)
PCO2 BLDA: 39 MM HG (ref 38–42)
PCO2 BLDA: 40 MM HG (ref 38–42)
PCO2 BLDA: 41 MM HG (ref 38–42)
PCO2 BLDA: 42 MM HG (ref 38–42)
PCO2 BLDA: 44 MM HG (ref 38–42)
PCO2 BLDA: 44 MM HG (ref 38–42)
PCO2 BLDA: 46 MM HG (ref 38–42)
PCO2 BLDA: 48 MM HG (ref 38–42)
PCO2 BLDA: 53 MM HG (ref 38–42)
PCO2 BLDV: 53 MM HG (ref 41–51)
PEEP CMH2O: 5 CM H2O
PH BLDA: 7.34 PH (ref 7.38–7.42)
PH BLDA: 7.36 PH (ref 7.38–7.42)
PH BLDA: 7.37 PH (ref 7.38–7.42)
PH BLDA: 7.38 PH (ref 7.38–7.42)
PH BLDA: 7.38 PH (ref 7.38–7.42)
PH BLDA: 7.4 PH (ref 7.38–7.42)
PH BLDA: 7.41 PH (ref 7.38–7.42)
PH BLDA: 7.43 PH (ref 7.38–7.42)
PH BLDA: 7.44 PH (ref 7.38–7.42)
PH BLDA: 7.49 PH (ref 7.38–7.42)
PH BLDV: 7.37 PH (ref 7.33–7.43)
PHOSPHATE SERPL-MCNC: 1.7 MG/DL (ref 2.5–4.9)
PLATELET # BLD AUTO: 91 X10*3/UL (ref 150–450)
PO2 BLDA: 238 MM HG (ref 85–95)
PO2 BLDA: 240 MM HG (ref 85–95)
PO2 BLDA: 247 MM HG (ref 85–95)
PO2 BLDA: 261 MM HG (ref 85–95)
PO2 BLDA: 289 MM HG (ref 85–95)
PO2 BLDA: 318 MM HG (ref 85–95)
PO2 BLDA: 347 MM HG (ref 85–95)
PO2 BLDA: 355 MM HG (ref 85–95)
PO2 BLDA: 395 MM HG (ref 85–95)
PO2 BLDA: 464 MM HG (ref 85–95)
PO2 BLDV: 49 MM HG (ref 35–45)
POTASSIUM BLDA-SCNC: 3.2 MMOL/L (ref 3.5–5.3)
POTASSIUM BLDA-SCNC: 3.3 MMOL/L (ref 3.5–5.3)
POTASSIUM BLDA-SCNC: 3.3 MMOL/L (ref 3.5–5.3)
POTASSIUM BLDA-SCNC: 3.6 MMOL/L (ref 3.5–5.3)
POTASSIUM BLDA-SCNC: 3.8 MMOL/L (ref 3.5–5.3)
POTASSIUM BLDA-SCNC: 4 MMOL/L (ref 3.5–5.3)
POTASSIUM BLDA-SCNC: 4.1 MMOL/L (ref 3.5–5.3)
POTASSIUM BLDA-SCNC: 4.2 MMOL/L (ref 3.5–5.3)
POTASSIUM BLDA-SCNC: 4.6 MMOL/L (ref 3.5–5.3)
POTASSIUM BLDA-SCNC: 4.6 MMOL/L (ref 3.5–5.3)
POTASSIUM BLDV-SCNC: 3.4 MMOL/L (ref 3.5–5.3)
POTASSIUM SERPL-SCNC: 4.1 MMOL/L (ref 3.5–5.3)
PROTHROMBIN TIME: 14 SECONDS (ref 9.8–12.8)
RBC # BLD AUTO: 3.16 X10*6/UL (ref 4–5.2)
RH FACTOR (ANTIGEN D): NORMAL
SAO2 % BLDA: 100 % (ref 94–100)
SAO2 % BLDA: 99 % (ref 94–100)
SAO2 % BLDA: 99 % (ref 94–100)
SAO2 % BLDV: 87 % (ref 45–75)
SODIUM BLDA-SCNC: 137 MMOL/L (ref 136–145)
SODIUM BLDA-SCNC: 138 MMOL/L (ref 136–145)
SODIUM BLDA-SCNC: 138 MMOL/L (ref 136–145)
SODIUM BLDA-SCNC: 139 MMOL/L (ref 136–145)
SODIUM BLDA-SCNC: 140 MMOL/L (ref 136–145)
SODIUM BLDA-SCNC: 141 MMOL/L (ref 136–145)
SODIUM BLDV-SCNC: 140 MMOL/L (ref 136–145)
SODIUM SERPL-SCNC: 143 MMOL/L (ref 136–145)
SPECIMEN DRAWN FROM PATIENT: ABNORMAL
TIDAL VOLUME: 480 ML
TRICUSPID ANNULAR PLANE SYSTOLIC EXCURSION: 1.8 CM
VENTILATOR MODE: ABNORMAL
VENTILATOR RATE: 14 BPM
WBC # BLD AUTO: 8.5 X10*3/UL (ref 4.4–11.3)

## 2024-07-02 PROCEDURE — 83050 HGB METHEMOGLOBIN QUAN: CPT

## 2024-07-02 PROCEDURE — 2500000004 HC RX 250 GENERAL PHARMACY W/ HCPCS (ALT 636 FOR OP/ED): Performed by: STUDENT IN AN ORGANIZED HEALTH CARE EDUCATION/TRAINING PROGRAM

## 2024-07-02 PROCEDURE — 99292 CRITICAL CARE ADDL 30 MIN: CPT | Performed by: NURSE PRACTITIONER

## 2024-07-02 PROCEDURE — 71045 X-RAY EXAM CHEST 1 VIEW: CPT | Performed by: RADIOLOGY

## 2024-07-02 PROCEDURE — C9248 INJ, CLEVIDIPINE BUTYRATE: HCPCS | Performed by: ANESTHESIOLOGIST ASSISTANT

## 2024-07-02 PROCEDURE — 2500000002 HC RX 250 W HCPCS SELF ADMINISTERED DRUGS (ALT 637 FOR MEDICARE OP, ALT 636 FOR OP/ED): Performed by: NURSE PRACTITIONER

## 2024-07-02 PROCEDURE — 2500000005 HC RX 250 GENERAL PHARMACY W/O HCPCS: Performed by: ANESTHESIOLOGIST ASSISTANT

## 2024-07-02 PROCEDURE — 36430 TRANSFUSION BLD/BLD COMPNT: CPT | Performed by: ANESTHESIOLOGIST ASSISTANT

## 2024-07-02 PROCEDURE — 84132 ASSAY OF SERUM POTASSIUM: CPT | Performed by: NURSE PRACTITIONER

## 2024-07-02 PROCEDURE — P9016 RBC LEUKOCYTES REDUCED: HCPCS

## 2024-07-02 PROCEDURE — 5A1221Z PERFORMANCE OF CARDIAC OUTPUT, CONTINUOUS: ICD-10-PCS | Performed by: STUDENT IN AN ORGANIZED HEALTH CARE EDUCATION/TRAINING PROGRAM

## 2024-07-02 PROCEDURE — 82947 ASSAY GLUCOSE BLOOD QUANT: CPT

## 2024-07-02 PROCEDURE — 84132 ASSAY OF SERUM POTASSIUM: CPT

## 2024-07-02 PROCEDURE — 99291 CRITICAL CARE FIRST HOUR: CPT | Performed by: NURSE PRACTITIONER

## 2024-07-02 PROCEDURE — 2500000005 HC RX 250 GENERAL PHARMACY W/O HCPCS: Performed by: STUDENT IN AN ORGANIZED HEALTH CARE EDUCATION/TRAINING PROGRAM

## 2024-07-02 PROCEDURE — 06BP4ZZ EXCISION OF RIGHT SAPHENOUS VEIN, PERCUTANEOUS ENDOSCOPIC APPROACH: ICD-10-PCS | Performed by: STUDENT IN AN ORGANIZED HEALTH CARE EDUCATION/TRAINING PROGRAM

## 2024-07-02 PROCEDURE — C9248 INJ, CLEVIDIPINE BUTYRATE: HCPCS

## 2024-07-02 PROCEDURE — 02100AW BYPASS CORONARY ARTERY, ONE ARTERY FROM AORTA WITH AUTOLOGOUS ARTERIAL TISSUE, OPEN APPROACH: ICD-10-PCS | Performed by: STUDENT IN AN ORGANIZED HEALTH CARE EDUCATION/TRAINING PROGRAM

## 2024-07-02 PROCEDURE — 83735 ASSAY OF MAGNESIUM: CPT | Performed by: NURSE PRACTITIONER

## 2024-07-02 PROCEDURE — 02100Z9 BYPASS CORONARY ARTERY, ONE ARTERY FROM LEFT INTERNAL MAMMARY, OPEN APPROACH: ICD-10-PCS | Performed by: STUDENT IN AN ORGANIZED HEALTH CARE EDUCATION/TRAINING PROGRAM

## 2024-07-02 PROCEDURE — 85384 FIBRINOGEN ACTIVITY: CPT | Performed by: NURSE PRACTITIONER

## 2024-07-02 PROCEDURE — 82810 BLOOD GASES O2 SAT ONLY: CPT

## 2024-07-02 PROCEDURE — 2500000004 HC RX 250 GENERAL PHARMACY W/ HCPCS (ALT 636 FOR OP/ED): Performed by: ANESTHESIOLOGIST ASSISTANT

## 2024-07-02 PROCEDURE — 2500000004 HC RX 250 GENERAL PHARMACY W/ HCPCS (ALT 636 FOR OP/ED)

## 2024-07-02 PROCEDURE — 3700000001 HC GENERAL ANESTHESIA TIME - INITIAL BASE CHARGE: Performed by: STUDENT IN AN ORGANIZED HEALTH CARE EDUCATION/TRAINING PROGRAM

## 2024-07-02 PROCEDURE — 3600000017 HC OR TIME - EACH INCREMENTAL 1 MINUTE - PROCEDURE LEVEL SIX: Performed by: STUDENT IN AN ORGANIZED HEALTH CARE EDUCATION/TRAINING PROGRAM

## 2024-07-02 PROCEDURE — 85610 PROTHROMBIN TIME: CPT | Performed by: NURSE PRACTITIONER

## 2024-07-02 PROCEDURE — 03BB4ZZ EXCISION OF RIGHT RADIAL ARTERY, PERCUTANEOUS ENDOSCOPIC APPROACH: ICD-10-PCS | Performed by: STUDENT IN AN ORGANIZED HEALTH CARE EDUCATION/TRAINING PROGRAM

## 2024-07-02 PROCEDURE — 93005 ELECTROCARDIOGRAM TRACING: CPT

## 2024-07-02 PROCEDURE — 94002 VENT MGMT INPAT INIT DAY: CPT

## 2024-07-02 PROCEDURE — 37799 UNLISTED PX VASCULAR SURGERY: CPT | Performed by: NURSE PRACTITIONER

## 2024-07-02 PROCEDURE — 3600000018 HC OR TIME - INITIAL BASE CHARGE - PROCEDURE LEVEL SIX: Performed by: STUDENT IN AN ORGANIZED HEALTH CARE EDUCATION/TRAINING PROGRAM

## 2024-07-02 PROCEDURE — 2500000004 HC RX 250 GENERAL PHARMACY W/ HCPCS (ALT 636 FOR OP/ED): Performed by: NURSE PRACTITIONER

## 2024-07-02 PROCEDURE — 3600000011 HC PERFUSION TIME - INITIAL BASE CHARGE: Performed by: STUDENT IN AN ORGANIZED HEALTH CARE EDUCATION/TRAINING PROGRAM

## 2024-07-02 PROCEDURE — 2780000003 HC OR 278 NO HCPCS: Performed by: STUDENT IN AN ORGANIZED HEALTH CARE EDUCATION/TRAINING PROGRAM

## 2024-07-02 PROCEDURE — 71045 X-RAY EXAM CHEST 1 VIEW: CPT

## 2024-07-02 PROCEDURE — 82375 ASSAY CARBOXYHB QUANT: CPT

## 2024-07-02 PROCEDURE — P9045 ALBUMIN (HUMAN), 5%, 250 ML: HCPCS | Mod: JZ | Performed by: ANESTHESIOLOGIST ASSISTANT

## 2024-07-02 PROCEDURE — A4649 SURGICAL SUPPLIES: HCPCS | Performed by: STUDENT IN AN ORGANIZED HEALTH CARE EDUCATION/TRAINING PROGRAM

## 2024-07-02 PROCEDURE — 85347 COAGULATION TIME ACTIVATED: CPT

## 2024-07-02 PROCEDURE — 85730 THROMBOPLASTIN TIME PARTIAL: CPT | Performed by: NURSE PRACTITIONER

## 2024-07-02 PROCEDURE — 85027 COMPLETE CBC AUTOMATED: CPT | Performed by: NURSE PRACTITIONER

## 2024-07-02 PROCEDURE — 3600000012 HC PERFUSION TIME - EACH INCREMENTAL 1 MINUTE: Performed by: STUDENT IN AN ORGANIZED HEALTH CARE EDUCATION/TRAINING PROGRAM

## 2024-07-02 PROCEDURE — 3700000002 HC GENERAL ANESTHESIA TIME - EACH INCREMENTAL 1 MINUTE: Performed by: STUDENT IN AN ORGANIZED HEALTH CARE EDUCATION/TRAINING PROGRAM

## 2024-07-02 PROCEDURE — 36415 COLL VENOUS BLD VENIPUNCTURE: CPT | Performed by: STUDENT IN AN ORGANIZED HEALTH CARE EDUCATION/TRAINING PROGRAM

## 2024-07-02 PROCEDURE — 93010 ELECTROCARDIOGRAM REPORT: CPT | Performed by: INTERNAL MEDICINE

## 2024-07-02 PROCEDURE — 2020000001 HC ICU ROOM DAILY

## 2024-07-02 PROCEDURE — 021009W BYPASS CORONARY ARTERY, ONE ARTERY FROM AORTA WITH AUTOLOGOUS VENOUS TISSUE, OPEN APPROACH: ICD-10-PCS | Performed by: STUDENT IN AN ORGANIZED HEALTH CARE EDUCATION/TRAINING PROGRAM

## 2024-07-02 PROCEDURE — 2720000007 HC OR 272 NO HCPCS: Performed by: STUDENT IN AN ORGANIZED HEALTH CARE EDUCATION/TRAINING PROGRAM

## 2024-07-02 PROCEDURE — P9047 ALBUMIN (HUMAN), 25%, 50ML: HCPCS | Mod: JZ | Performed by: STUDENT IN AN ORGANIZED HEALTH CARE EDUCATION/TRAINING PROGRAM

## 2024-07-02 PROCEDURE — 33535 CABG ARTERIAL THREE: CPT | Performed by: STUDENT IN AN ORGANIZED HEALTH CARE EDUCATION/TRAINING PROGRAM

## 2024-07-02 PROCEDURE — 2500000001 HC RX 250 WO HCPCS SELF ADMINISTERED DRUGS (ALT 637 FOR MEDICARE OP): Performed by: NURSE PRACTITIONER

## 2024-07-02 PROCEDURE — 30233N1 TRANSFUSION OF NONAUTOLOGOUS RED BLOOD CELLS INTO PERIPHERAL VEIN, PERCUTANEOUS APPROACH: ICD-10-PCS | Performed by: STUDENT IN AN ORGANIZED HEALTH CARE EDUCATION/TRAINING PROGRAM

## 2024-07-02 PROCEDURE — 33509 NDSC HRV UXTR ART 1 SGM CAB: CPT | Performed by: STUDENT IN AN ORGANIZED HEALTH CARE EDUCATION/TRAINING PROGRAM

## 2024-07-02 PROCEDURE — 2500000002 HC RX 250 W HCPCS SELF ADMINISTERED DRUGS (ALT 637 FOR MEDICARE OP, ALT 636 FOR OP/ED): Performed by: ANESTHESIOLOGIST ASSISTANT

## 2024-07-02 DEVICE — ADAPTER 13003 CARDIOPLEGIA MGMT SET 17L
Type: IMPLANTABLE DEVICE | Site: HEART | Status: FUNCTIONAL
Brand: DLP®

## 2024-07-02 RX ORDER — PAPAVERINE HYDROCHLORIDE 30 MG/ML
INJECTION INTRAMUSCULAR; INTRAVENOUS AS NEEDED
Status: DISCONTINUED | OUTPATIENT
Start: 2024-07-02 | End: 2024-07-02 | Stop reason: HOSPADM

## 2024-07-02 RX ORDER — PHENYLEPHRINE HCL IN 0.9% NACL 1 MG/10 ML
SYRINGE (ML) INTRAVENOUS AS NEEDED
Status: DISCONTINUED | OUTPATIENT
Start: 2024-07-02 | End: 2024-07-02

## 2024-07-02 RX ORDER — PROTAMINE SULFATE 10 MG/ML
INJECTION, SOLUTION INTRAVENOUS AS NEEDED
Status: DISCONTINUED | OUTPATIENT
Start: 2024-07-02 | End: 2024-07-02

## 2024-07-02 RX ORDER — CEFAZOLIN SODIUM 2 G/100ML
2 INJECTION, SOLUTION INTRAVENOUS EVERY 8 HOURS
Status: COMPLETED | OUTPATIENT
Start: 2024-07-02 | End: 2024-07-04

## 2024-07-02 RX ORDER — AMOXICILLIN 250 MG
2 CAPSULE ORAL 2 TIMES DAILY
Status: DISCONTINUED | OUTPATIENT
Start: 2024-07-02 | End: 2024-07-08

## 2024-07-02 RX ORDER — OXYCODONE HYDROCHLORIDE 5 MG/1
5 TABLET ORAL EVERY 4 HOURS PRN
Status: DISCONTINUED | OUTPATIENT
Start: 2024-07-02 | End: 2024-07-10 | Stop reason: HOSPADM

## 2024-07-02 RX ORDER — CEFAZOLIN 1 G/1
INJECTION, POWDER, FOR SOLUTION INTRAVENOUS AS NEEDED
Status: DISCONTINUED | OUTPATIENT
Start: 2024-07-02 | End: 2024-07-02

## 2024-07-02 RX ORDER — ROCURONIUM BROMIDE 10 MG/ML
INJECTION, SOLUTION INTRAVENOUS AS NEEDED
Status: DISCONTINUED | OUTPATIENT
Start: 2024-07-02 | End: 2024-07-02

## 2024-07-02 RX ORDER — POLYETHYLENE GLYCOL 3350 17 G/17G
17 POWDER, FOR SOLUTION ORAL DAILY
Status: DISCONTINUED | OUTPATIENT
Start: 2024-07-02 | End: 2024-07-08

## 2024-07-02 RX ORDER — MAGNESIUM SULFATE HEPTAHYDRATE 500 MG/ML
INJECTION, SOLUTION INTRAMUSCULAR; INTRAVENOUS AS NEEDED
Status: DISCONTINUED | OUTPATIENT
Start: 2024-07-02 | End: 2024-07-02

## 2024-07-02 RX ORDER — DEXTROSE 50 % IN WATER (D50W) INTRAVENOUS SYRINGE
25
Status: DISCONTINUED | OUTPATIENT
Start: 2024-07-02 | End: 2024-07-10 | Stop reason: HOSPADM

## 2024-07-02 RX ORDER — NALOXONE HYDROCHLORIDE 0.4 MG/ML
0.2 INJECTION, SOLUTION INTRAMUSCULAR; INTRAVENOUS; SUBCUTANEOUS EVERY 5 MIN PRN
Status: DISCONTINUED | OUTPATIENT
Start: 2024-07-02 | End: 2024-07-10 | Stop reason: HOSPADM

## 2024-07-02 RX ORDER — SODIUM CHLORIDE, SODIUM GLUCONATE, SODIUM ACETATE, POTASSIUM CHLORIDE AND MAGNESIUM CHLORIDE 30; 37; 368; 526; 502 MG/100ML; MG/100ML; MG/100ML; MG/100ML; MG/100ML
INJECTION, SOLUTION INTRAVENOUS CONTINUOUS PRN
Status: DISCONTINUED | OUTPATIENT
Start: 2024-07-02 | End: 2024-07-02

## 2024-07-02 RX ORDER — INSULIN LISPRO 100 [IU]/ML
0-15 INJECTION, SOLUTION INTRAVENOUS; SUBCUTANEOUS EVERY 4 HOURS
Status: DISCONTINUED | OUTPATIENT
Start: 2024-07-02 | End: 2024-07-09

## 2024-07-02 RX ORDER — SODIUM CHLORIDE 0.9 G/100ML
IRRIGANT IRRIGATION AS NEEDED
Status: DISCONTINUED | OUTPATIENT
Start: 2024-07-02 | End: 2024-07-02 | Stop reason: HOSPADM

## 2024-07-02 RX ORDER — MIDAZOLAM HYDROCHLORIDE 1 MG/ML
INJECTION, SOLUTION INTRAMUSCULAR; INTRAVENOUS AS NEEDED
Status: DISCONTINUED | OUTPATIENT
Start: 2024-07-02 | End: 2024-07-02

## 2024-07-02 RX ORDER — LIDOCAINE HYDROCHLORIDE 20 MG/ML
INJECTION, SOLUTION INFILTRATION; PERINEURAL AS NEEDED
Status: DISCONTINUED | OUTPATIENT
Start: 2024-07-02 | End: 2024-07-02

## 2024-07-02 RX ORDER — FENTANYL CITRATE 50 UG/ML
INJECTION, SOLUTION INTRAMUSCULAR; INTRAVENOUS
Status: COMPLETED
Start: 2024-07-02 | End: 2024-07-02

## 2024-07-02 RX ORDER — PANTOPRAZOLE SODIUM 40 MG/1
40 TABLET, DELAYED RELEASE ORAL
Status: DISCONTINUED | OUTPATIENT
Start: 2024-07-03 | End: 2024-07-02

## 2024-07-02 RX ORDER — SODIUM CHLORIDE, SODIUM LACTATE, POTASSIUM CHLORIDE, CALCIUM CHLORIDE 600; 310; 30; 20 MG/100ML; MG/100ML; MG/100ML; MG/100ML
5 INJECTION, SOLUTION INTRAVENOUS CONTINUOUS
Status: DISCONTINUED | OUTPATIENT
Start: 2024-07-02 | End: 2024-07-07

## 2024-07-02 RX ORDER — FENTANYL CITRATE 50 UG/ML
INJECTION, SOLUTION INTRAMUSCULAR; INTRAVENOUS AS NEEDED
Status: DISCONTINUED | OUTPATIENT
Start: 2024-07-02 | End: 2024-07-02

## 2024-07-02 RX ORDER — PANTOPRAZOLE SODIUM 40 MG/1
40 TABLET, DELAYED RELEASE ORAL
Status: DISCONTINUED | OUTPATIENT
Start: 2024-07-03 | End: 2024-07-02 | Stop reason: SDUPTHER

## 2024-07-02 RX ORDER — FENTANYL CITRATE 50 UG/ML
100 INJECTION, SOLUTION INTRAMUSCULAR; INTRAVENOUS ONCE
Status: COMPLETED | OUTPATIENT
Start: 2024-07-02 | End: 2024-07-02

## 2024-07-02 RX ORDER — PROPOFOL 10 MG/ML
5-50 INJECTION, EMULSION INTRAVENOUS CONTINUOUS
Status: DISCONTINUED | OUTPATIENT
Start: 2024-07-02 | End: 2024-07-03

## 2024-07-02 RX ORDER — PANTOPRAZOLE SODIUM 40 MG/10ML
40 INJECTION, POWDER, LYOPHILIZED, FOR SOLUTION INTRAVENOUS
Status: DISCONTINUED | OUTPATIENT
Start: 2024-07-03 | End: 2024-07-02

## 2024-07-02 RX ORDER — ALBUMIN HUMAN 50 G/1000ML
SOLUTION INTRAVENOUS AS NEEDED
Status: DISCONTINUED | OUTPATIENT
Start: 2024-07-02 | End: 2024-07-02

## 2024-07-02 RX ORDER — PROPOFOL 10 MG/ML
INJECTION, EMULSION INTRAVENOUS AS NEEDED
Status: DISCONTINUED | OUTPATIENT
Start: 2024-07-02 | End: 2024-07-02

## 2024-07-02 RX ORDER — ACETAMINOPHEN 325 MG/1
650 TABLET ORAL EVERY 6 HOURS
Status: DISCONTINUED | OUTPATIENT
Start: 2024-07-02 | End: 2024-07-10 | Stop reason: HOSPADM

## 2024-07-02 RX ORDER — PHENYLEPHRINE 10 MG/250 ML(40 MCG/ML)IN 0.9 % SOD.CHLORIDE INTRAVENOUS
CONTINUOUS PRN
Status: DISCONTINUED | OUTPATIENT
Start: 2024-07-02 | End: 2024-07-02

## 2024-07-02 RX ORDER — HYDROMORPHONE HYDROCHLORIDE 0.2 MG/ML
0.2 INJECTION INTRAMUSCULAR; INTRAVENOUS; SUBCUTANEOUS
Status: DISCONTINUED | OUTPATIENT
Start: 2024-07-02 | End: 2024-07-03

## 2024-07-02 RX ORDER — LIDOCAINE HYDROCHLORIDE 10 MG/ML
INJECTION INFILTRATION; PERINEURAL AS NEEDED
Status: DISCONTINUED | OUTPATIENT
Start: 2024-07-02 | End: 2024-07-02

## 2024-07-02 RX ORDER — PROPOFOL 10 MG/ML
INJECTION, EMULSION INTRAVENOUS
Status: DISPENSED
Start: 2024-07-02 | End: 2024-07-03

## 2024-07-02 RX ORDER — VANCOMYCIN HYDROCHLORIDE 1 G/20ML
INJECTION, POWDER, LYOPHILIZED, FOR SOLUTION INTRAVENOUS AS NEEDED
Status: DISCONTINUED | OUTPATIENT
Start: 2024-07-02 | End: 2024-07-02 | Stop reason: HOSPADM

## 2024-07-02 RX ORDER — HEPARIN SODIUM 1000 [USP'U]/ML
INJECTION, SOLUTION INTRAVENOUS; SUBCUTANEOUS AS NEEDED
Status: DISCONTINUED | OUTPATIENT
Start: 2024-07-02 | End: 2024-07-02 | Stop reason: HOSPADM

## 2024-07-02 RX ORDER — NAPROXEN SODIUM 220 MG/1
81 TABLET, FILM COATED ORAL DAILY
Status: DISCONTINUED | OUTPATIENT
Start: 2024-07-02 | End: 2024-07-10 | Stop reason: HOSPADM

## 2024-07-02 RX ORDER — ATORVASTATIN CALCIUM 80 MG/1
80 TABLET, FILM COATED ORAL NIGHTLY
Status: DISCONTINUED | OUTPATIENT
Start: 2024-07-02 | End: 2024-07-10 | Stop reason: HOSPADM

## 2024-07-02 RX ORDER — PANTOPRAZOLE SODIUM 40 MG/10ML
40 INJECTION, POWDER, LYOPHILIZED, FOR SOLUTION INTRAVENOUS
Status: DISCONTINUED | OUTPATIENT
Start: 2024-07-03 | End: 2024-07-02 | Stop reason: SDUPTHER

## 2024-07-02 RX ORDER — PROPOFOL 10 MG/ML
INJECTION, EMULSION INTRAVENOUS CONTINUOUS PRN
Status: DISCONTINUED | OUTPATIENT
Start: 2024-07-02 | End: 2024-07-02

## 2024-07-02 RX ORDER — NOREPINEPHRINE BITARTRATE/D5W 8 MG/250ML
0-.5 PLASTIC BAG, INJECTION (ML) INTRAVENOUS CONTINUOUS
Status: DISCONTINUED | OUTPATIENT
Start: 2024-07-02 | End: 2024-07-03

## 2024-07-02 RX ORDER — OXYCODONE HYDROCHLORIDE 5 MG/1
10 TABLET ORAL EVERY 4 HOURS PRN
Status: DISCONTINUED | OUTPATIENT
Start: 2024-07-02 | End: 2024-07-10 | Stop reason: HOSPADM

## 2024-07-02 RX ORDER — ENOXAPARIN SODIUM 100 MG/ML
40 INJECTION SUBCUTANEOUS EVERY 24 HOURS
Status: DISCONTINUED | OUTPATIENT
Start: 2024-07-03 | End: 2024-07-10 | Stop reason: HOSPADM

## 2024-07-02 SDOH — SOCIAL STABILITY: SOCIAL INSECURITY: HAVE YOU HAD THOUGHTS OF HARMING ANYONE ELSE?: UNABLE TO ASSESS

## 2024-07-02 ASSESSMENT — ACTIVITIES OF DAILY LIVING (ADL)
ADEQUATE_TO_COMPLETE_ADL: UNABLE TO ASSESS
WALKS IN HOME: UNABLE TO ASSESS
DRESSING YOURSELF: UNABLE TO ASSESS
BATHING: UNABLE TO ASSESS
FEEDING YOURSELF: UNABLE TO ASSESS
HEARING - LEFT EAR: FUNCTIONAL
PATIENT'S MEMORY ADEQUATE TO SAFELY COMPLETE DAILY ACTIVITIES?: UNABLE TO ASSESS
GROOMING: UNABLE TO ASSESS
HEARING - RIGHT EAR: FUNCTIONAL
TOILETING: UNABLE TO ASSESS
LACK_OF_TRANSPORTATION: PATIENT UNABLE TO ANSWER
JUDGMENT_ADEQUATE_SAFELY_COMPLETE_DAILY_ACTIVITIES: UNABLE TO ASSESS

## 2024-07-02 ASSESSMENT — PATIENT HEALTH QUESTIONNAIRE - PHQ9
1. LITTLE INTEREST OR PLEASURE IN DOING THINGS: NOT AT ALL
SUM OF ALL RESPONSES TO PHQ9 QUESTIONS 1 & 2: 0
2. FEELING DOWN, DEPRESSED OR HOPELESS: NOT AT ALL

## 2024-07-02 ASSESSMENT — COGNITIVE AND FUNCTIONAL STATUS - GENERAL
PATIENT BASELINE BEDBOUND: NO
MOBILITY SCORE: 24
DAILY ACTIVITIY SCORE: 24

## 2024-07-02 ASSESSMENT — COLUMBIA-SUICIDE SEVERITY RATING SCALE - C-SSRS
1. IN THE PAST MONTH, HAVE YOU WISHED YOU WERE DEAD OR WISHED YOU COULD GO TO SLEEP AND NOT WAKE UP?: NO
2. HAVE YOU ACTUALLY HAD ANY THOUGHTS OF KILLING YOURSELF?: NO
6. HAVE YOU EVER DONE ANYTHING, STARTED TO DO ANYTHING, OR PREPARED TO DO ANYTHING TO END YOUR LIFE?: NO

## 2024-07-02 ASSESSMENT — LIFESTYLE VARIABLES
SUBSTANCE_ABUSE_PAST_12_MONTHS: NO
HOW OFTEN DO YOU HAVE A DRINK CONTAINING ALCOHOL: PATIENT UNABLE TO ANSWER
HOW OFTEN DO YOU HAVE 6 OR MORE DRINKS ON ONE OCCASION: PATIENT UNABLE TO ANSWER

## 2024-07-02 ASSESSMENT — PAIN SCALES - GENERAL
PAINLEVEL_OUTOF10: 0 - NO PAIN
PAINLEVEL_OUTOF10: 5 - MODERATE PAIN
PAINLEVEL_OUTOF10: 0 - NO PAIN
PAINLEVEL_OUTOF10: 0 - NO PAIN

## 2024-07-02 ASSESSMENT — PAIN - FUNCTIONAL ASSESSMENT
PAIN_FUNCTIONAL_ASSESSMENT: CPOT (CRITICAL CARE PAIN OBSERVATION TOOL)
PAIN_FUNCTIONAL_ASSESSMENT: CPOT (CRITICAL CARE PAIN OBSERVATION TOOL)
PAIN_FUNCTIONAL_ASSESSMENT: 0-10
PAIN_FUNCTIONAL_ASSESSMENT: CPOT (CRITICAL CARE PAIN OBSERVATION TOOL)

## 2024-07-02 NOTE — ANESTHESIA PROCEDURE NOTES
Arterial Line:    Date/Time: 7/2/2024 8:38 AM    Staffing  Performed: MARY   Authorized by: Willem Schwarz MD    Performed by: MARY Duarte    An arterial line was placed. Procedure performed using ultrasound guidance.in the OR for the following indication(s): continuous blood pressure monitoring and blood sampling needed.    A 20 gauge (size), 12 cm (length), Angiocath (type) catheter was placed into the Left brachial artery, secured by Tegadecheyanne   Seldinger technique used.  Events:  patient tolerated procedure well with no complications.

## 2024-07-02 NOTE — ANESTHESIA PROCEDURE NOTES
Central Venous Line:    Date/Time: 7/2/2024 8:58 AM    A central venous line was placed in the OR for the following indication(s): central venous access and CVP monitoring.  Staffing  Performed: [facility]   Authorized by: Willem Schwarz MD    Performed by: MARY Duarte    Sterility preparation included the following: provider hand hygiene performed prior to central venous catheter insertion, all 5 sterile barriers used (gloves, gown, cap, mask, large sterile drape) during central venous catheter insertion, antiseptic used during central venous catheter insertion and skin prep agent completely dried prior to procedure.  The patient was placed in Trendelenburg position.    Right internal jugular vein was prepped.    The site was prepped with Chlorhexidine.  Size: 9 Fr   Length: 11.5  Catheter type: introducer   Number of Lumens: double lumen      During the procedure, the following specific steps were taken: target vein identified, needle advanced into vein and blood aspirated and guidewire advanced into vein.  Seldinger technique used.  Procedure performed using ultrasound guidance.  Sterile gel and probe cover used in ultrasound-guided central venous catheter insertion.    Intravenous verification was obtained by ultrasound.      Post insertion care included: all ports aspirated, all ports flushed easily, guidewire removed intact, Biopatch applied, line sutured in place and dressing applied.    During the procedure the patient experienced: patient tolerated procedure well with no complications.           images stored in chart

## 2024-07-02 NOTE — BRIEF OP NOTE
Date: 2024  OR Location: [facility] OR    Name: Rita Rubi, : 1955, Age: 69 y.o., MRN: [id number], Sex: female    Diagnosis  Pre-op Diagnosis     * Coronary artery disease involving native coronary artery of native heart, unspecified whether angina present [I25.10] Post-op Diagnosis     * Coronary artery disease involving native coronary artery of native heart, unspecified whether angina present [I25.10]     Procedures  Coronary Artery Bypass Graft x2 ; LIMA --> LAD, EVH RA --> RAMUS --> CX  72823 - TX CABG W/ARTERIAL GRAFT THREE ARTERIAL GRAFTS  Median Sternotomy  EVH Right Radial Artery  EVH Right Saphenous Vein  CABG x 3 LIMA to LAD 26 ml/min, PI 3.6  Radial To Ramus, sequenced to Circ 26 ml/min,PI 1.6  Posterior Pericardial Window    Chest Tubes/Drains: Bilateral Pleural, Mediastinal x 1       Temporary Pacing Wires: A and V wires    -Settings: DDD 80   -Underlying Rhythm: Sinus, 68    Sternotomy performed by: Chilango    Conduit Harvested by: Radial, Dayanna/ Vein, Craycyvettet    Sternal Wires placed by: PPisano    Arm/Leg/Groin Closure/Cutdown performed by: Arm:Kee/ Leg:Dayanna    Cardio Pulmonary Bypass Time: 108 min  Cross-clamp Time: 77 min      Is patient candidate for Emergency Re-sternotomy? Yes   -If yes, POD #10 is -       Surgeons      * Franci Kee - Primary    Resident/Fellow/Other Assistant:  Surgeons and Role:  * No surgeons found with a matching role *  Dayanna RNFA  Craycroft RNFA  Notte PA-C    Procedure Summary  Anesthesia: General  ASA: III  Anesthesia Staff: Anesthesiologist: Willem Schwarz MD  C-AA: MARY Duarte; MARY Drake  Perfusionist: Mesfin Youngblood  Estimated Blood Loss: 250mL  Intra-op Medications:   Administrations occurring from 0730 to 1300 on 24:   Medication Name Total Dose   vancomycin (Vancocin) vial for injection 4 g   papaverine injection 60 mg   heparin 1,000 unit/mL injection 48,000 Units   sodium chloride 0.9 % irrigation solution 3,000 mL    insulin regular 100 unit/100 mL (1 unit/mL) in 0.9 % NaCl infusion 12.23 Units   norepinephrine (Levophed) 8 mg in dextrose 5% 250 mL (0.032 mg/mL) infusion (premix) 0.13 mg              Anesthesia Record               Intraprocedure I/O Totals          Intake    PRBC 350.00 mL    Norepinephrine Drip 0.00 mL    The total shown is the total volume documented since Anesthesia Start was filed.    Clevidipine Drip 0.00 mL    The total shown is the total volume documented since Anesthesia Start was filed.    Insulin Drip 0.00 mL    The total shown is the total volume documented since Anesthesia Start was filed.    Phenylephrine Drip 0.00 mL    The total shown is the total volume documented since Anesthesia Start was filed.    perfusion prime builder 380.00 mL    Cell Saver 244 mL    Total Intake 974 mL       Output    Urine 130 mL    Total Output 130 mL       Net    Net Volume 844 mL          Specimen: No specimens collected     Staff:   Circulator: Mona Soler Person: Iza Paige Circulator: Dorys Paige Scrub: Samson          Findings: CAD    Complications:  None; patient tolerated the procedure well.     Disposition: ICU - intubated and hemodynamically stable.  Condition: stable  Specimens Collected: No specimens collected  Attending Attestation: I performed the procedure.    Franci Kee  Phone Number: [phone]

## 2024-07-02 NOTE — H&P
.CTICU History and Physical    Subjective   HPI:  Rita Rubi is a 69 y.o. female  with past medical history significant for CAD, HTN, HLD, TYLER, GERD, CVA on Plavix,  NIDDM II, Anemia. Work up for chest pain revealed  Multivessel disease including in stent re-stenosis of the circumflex and proximal LAD. She is admitted to ICU s/p CABG x 3 with Dr Kee      Pt arrived to ICU intubated on propofol and low dose levophed, Chest tubes to suction w/o air leak, expected bloody drainage.      Right arm AC and forearm tight with edema, Lost pulseOx,  Dr Kee at bedside, harvest site opened and clip applied to radial stump.  Additional bleeding noted with increased edema and tension Dr Kee aware, 1 of 2 sutures removed, blood expressed with decreased tension, Fellow at bedside. New dressing applied, plan to monitor for additional bleeding. HGB stable at 9.     OR course:   Procedure/Surgeon: Dr Kee  Coronary Artery Bypass Graft x2 ; LIMA --> LAD, EVH RA --> RAMUS --> CX  39693 - OR CABG W/ARTERIAL GRAFT THREE ARTERIAL GRAFTS  Median Sternotomy  EVH Right Radial Artery  EVH Right Saphenous Vein  CABG x 3 LIMA to LAD 26 ml/min, PI 3.6  Radial To Ramus, sequenced to Circ 26 ml/min,PI 1.6  Posterior Pericardial Window  CPB: 108  min, XC:  77 min  ECHO:  LV function: EF 65%  Chest Tubes/Drains: 1 left pleural, 1 right pleural pleural, 1 mediastinal   Temporary wires: Atrial Ventricular DDD 80   Airway: grade 3 w/ mac     Fluids:  Crystalloid: 1500 ml   Albumin  750 ml  Cellsaver:  244 ml  Product:  RBC: 350 ml (3 units)  EBL: 500ml  UOP: 130 ml    Infusions: Norepinephrine and Propofol      Past Medical History   Past Medical History:   Diagnosis Date    Allergic rhinitis     Anemia     Anticoagulated     Plavix    Arthritis     left knee    Coronary artery disease involving native coronary artery of native heart, unspecified whether angina present     GERD (gastroesophageal reflux disease)     under control    Hyperlipidemia      Hypertension     Ischemic stroke (Multi) 09/2023    Cerebral infarction due to thrombosis of right middle cerebral artery    TYLER (obstructive sleep apnea)     does not use CPAP (stopped after her bariatric surgery)    Stuttering     has become worse since her stroke    Type 2 diabetes mellitus (Multi)     1/26/2024 A1C 5.7%        Surgical History  Past Surgical History:   Procedure Laterality Date    BREAST BIOPSY Left     benign    CARDIAC CATHETERIZATION N/A 05/01/2024    Procedure: Left Heart Cath, No LV;  Surgeon: Armond Leyva MD;  Location: [address] Cardiac Cath Lab;  Service: Cardiovascular;  Laterality: N/A;    CATARACT EXTRACTION Left 11/02/2016    CATARACT EXTRACTION Right 12/23/2013    CORONARY ANGIOPLASTY WITH STENT PLACEMENT  03/29/2023    FASCIOTOMY Left 06/01/2011    ENDOSCOPIC FASCIOTOMY PLANTAR    GASTRIC RESTRICTION SURGERY      MR HEAD ANGIO WO IV CONTRAST  09/10/2023    MR HEAD ANGIO WO IV CONTRAST 9/10/2023 [facility]    MR NECK ANGIO WO IV CONTRAST  09/10/2023    MR NECK ANGIO WO IV CONTRAST 9/10/2023 [facility] MRI    TOTAL ABDOMINAL HYSTERECTOMY          Social History   reports that she has never smoked. She has never been exposed to tobacco smoke. She has never used smokeless tobacco. She reports that she does not currently use alcohol. She reports that she does not use drugs.    Family History   Family History   Problem Relation Name Age of Onset    Heart attack Mother          CABG in mother in her 40s and sudden death at age 50    Esophageal cancer Father      Epilepsy Sister         Allergies  House dust and Pollen extracts     Home Meds  Medications Prior to Admission   Medication Sig Dispense Refill Last Dose    alpha tocopherol (Vitamin E) 268 mg (400 unit) capsule Take 1 capsule (400 Units) by mouth once daily.   Past Week    amLODIPine (Norvasc) 5 mg tablet TAKE 1 TABLET BY MOUTH EVERY DAY 30 tablet 1 7/2/2024    aspirin 81 mg EC tablet Take 1 tablet (81 mg) by mouth once daily.  90 tablet 1 7/2/2024    atorvastatin (Lipitor) 80 mg tablet Take 1 tablet (80 mg) by mouth once daily at bedtime. 90 tablet 1 7/1/2024    chlorhexidine (Peridex) 0.12 % solution 15 ml swish and spit for 30 seconds night prior to surgery and morning of surgery 473 mL 0 7/2/2024    clopidogrel (Plavix) 75 mg tablet TAKE 1 TABLET BY MOUTH EVERY DAY 30 tablet 1 Past Week    cyanocobalamin (Vitamin B-12) 250 mcg tablet Take 1 tablet (250 mcg) by mouth once daily.   Past Week    docusate sodium (Colace) 100 mg capsule Take 1 capsule (100 mg) by mouth 2 times a day as needed for constipation. 60 capsule 3 Past Week    isosorbide mononitrate ER (Imdur) 30 mg 24 hr tablet Take 3 tablets (90 mg) by mouth once daily. As directed 90 tablet 2 7/2/2024    metoprolol succinate XL (Toprol-XL) 25 mg 24 hr tablet Take 0.5 tablets (12.5 mg) by mouth once daily. 90 tablet 1 7/2/2024 at 0400    mv,calcium,min/iron/folic/vitK (OPTISOURCE ORAL) Take 1 tablet by mouth once daily.   Past Week    ONETOUCH ULTRASOFT LANCETS MISC Use as directed   7/1/2024    empagliflozin (Jardiance) 10 mg Take 1 tablet (10 mg) by mouth once daily. 90 tablet 1     enalapril (Vasotec) 20 mg tablet Take 1 tablet (20 mg) by mouth 2 times a day. 180 tablet 1     fluticasone (Flonase) 50 mcg/actuation nasal spray 1-2 sprays QD (Patient taking differently: Administer 1 spray into each nostril if needed for allergies or rhinitis. 1-2 sprays QD) 16 g 5     furosemide (Lasix) 20 mg tablet TAKE 1 TABLET BY MOUTH EVERY DAY 30 tablet 1     metFORMIN (Glucophage) 500 mg tablet TAKE 1 TABLET BY MOUTH TWICE A DAY WITH MEALS 60 tablet 1     nitroglycerin (Nitrostat) 0.4 mg SL tablet Place 1 tablet (0.4 mg) under the tongue every 5 minutes if needed for chest pain. (Patient not taking: Reported on 6/24/2024) 90 tablet 1        Review of Systems    Scheduled Medications:   acetaminophen, 650 mg, oral, q6h  aspirin, 81 mg, oral, Daily  atorvastatin, 80 mg, oral,  Nightly  ceFAZolin, 2 g, intravenous, q8h  [START ON 7/3/2024] enoxaparin, 40 mg, subcutaneous, q24h  insulin lispro, 0-15 Units, subcutaneous, q4h  polyethylene glycol, 17 g, oral, Daily  sennosides-docusate sodium, 2 tablet, oral, BID  tranexamic acid, 5,000 mg, intravenous, Once         Continuous Medications:   lactated Ringer's, 5 mL/hr, Last Rate: 5 mL/hr (07/02/24 1700)  norepinephrine, 0-0.5 mcg/kg/min, Last Rate: Stopped (07/02/24 1552)  propofol, 5-50 mcg/kg/min, Last Rate: 50 mcg/kg/min (07/02/24 1816)         PRN Medications:   PRN medications: alteplase, dextrose **OR** glucagon, HYDROmorphone, naloxone, oxyCODONE, oxyCODONE, oxygen    Objective   Vitals:  Most Recent:  Vitals:    07/02/24 1830   BP:    Pulse: 80   Resp:    Temp: 36.2 °C (97.2 °F)   SpO2: 100%       24hr Min/Max:  Temp  Min: 35.8 °C (96.4 °F)  Max: 36.3 °C (97.3 °F)  Pulse  Min: 74  Max: 81  BP  Min: 108/61  Max: 165/78  Resp  Min: 0  Max: 19  SpO2  Min: 81 %  Max: 100 %    LDA:   CVC 07/02/24 Double lumen Right Internal jugular (Active)   Placement Date/Time: 07/02/24 (c) 0844   Hand Hygiene Performed Prior to CVC Insertion: Yes  Site Prep: Chlorhexidine   Site Prep Agent has Completely Dried Before Insertion: Yes  All 5 Sterile Barriers Used (Gloves, Gown, Cap, Mask, Large Sterile Azul...   Number of days: 0       Arterial Line 07/02/24 Left Brachial (Active)   Placement Date/Time: 07/02/24 (c) 0843   Size: 20 G  Orientation: Left  Location: Brachial  Securement Method: Transparent dressing  Patient Tolerance: Tolerated well   Number of days: 0       ETT  7 mm (Active)   Placement Date/Time: 07/02/24 (c) 0832   Mask Ventilation: Vent by mask + OA or adjuvant +/- NMBA  Technique: Direct laryngoscopy  ETT Type: ETT - single  Single Lumen Tube Size: 7 mm  Cuffed: Yes  Laryngoscope: Moy  Blade Size: 3  Location: Ora...   Number of days: 0       Urethral Catheter Temperature probe;Straight-tip 16 Fr. (Active)   Placement Date/Time:  07/02/24 0830   Catheter Type: Temperature probe;Straight-tip  Tube Size (Fr.): 16 Fr.  Urine Returned: Yes   Number of days: 0         Vent settings:  Vent Mode: Assist control/Volume control plus  FiO2 (%):  [40 %-100 %] 40 %  S RR:  [14] 14  S VT:  [480 mL] 480 mL  PEEP/CPAP (cm H2O):  [5 cm H20] 5 cm H20  MAP (cm H2O):  [8-8.9] 8    Hemodynamic parameters for last 24 hours:  CVP:  [5 mmHg-14 mmHg] 5 mmHg    I/O:  No intake/output data recorded.    Physical Exam    Lab/Radiology/Diagnostic Review:  CMP:  Results from last 7 days   Lab Units 07/02/24  1510   SODIUM mmol/L 143   POTASSIUM mmol/L 4.1   CHLORIDE mmol/L 108*   CO2 mmol/L 27   ANION GAP mmol/L 12   BUN mg/dL 16   CREATININE mg/dL 0.99   EGFR mL/min/1.73m*2 61   MAGNESIUM mg/dL 3.10*   ALBUMIN g/dL 3.3*     CBC:  Results from last 7 days   Lab Units 07/02/24  1510   WBC AUTO x10*3/uL 8.5   HEMOGLOBIN g/dL 9.0*   HEMATOCRIT % 25.1*   PLATELETS AUTO x10*3/uL 91*   MCV fL 79*     COAG:   Results from last 7 days   Lab Units 07/02/24  1510   INR  1.2*     HEME/ENDO:     CARDIAC:        XR chest 1 view    Result Date: 7/2/2024  Interpreted By:  Mimi Garrido, STUDY: XR CHEST 1 VIEW;  7/2/2024 3:02 pm   INDICATION: Signs/Symptoms:Post op cardiac surgery.   COMPARISON: 06/24/2024   ACCESSION NUMBER(S): [id number]   ORDERING CLINICIAN: ANGELIC SINGH   FINDINGS: The heart is normal size.   There are bilateral chest tubes. There is right-sided atelectasis.   An endotracheal tube terminates above the salome.   A nasogastric tube terminates below the diaphragm.   A venous catheter is present on the right. The tip is in the region of superior vena cava.   Sternal wires and mediastinal clips are present. There are surgical clips in the left upper abdomen.   COMPARISON OF FINDING: Interval surgery.       Interval cardiothoracic surgery. Right-sided atelectasis.   MACRO: none   Signed by: Mimi Garrido 7/2/2024 3:14 PM Dictation workstation:    [id number]    Anesthesia Intraoperative Transesophageal Echocardiogram    Result Date: 7/2/2024  [facility] - Dept of Anesthesiology   [address] [place] [address]     Tel [phone] and Fax [phone] TRANSESOPHAGEAL ECHOCARDIOGRAM REPORT  Patient Name:      OSKAR STREETER         Mihai Physician:   [id number] Juan Berger MD Study Date:        7/2/2024            Ordering Provider:   [id number] JUAN BERGER MRN/PID:           [id number]            Fellow: Accession#:        [id number]        Nurse: Date of Birth/Age: 1955 / 69      Sonographer:                    years Gender:            F                   Additional Staff:    Franci Kee MD BSA / BMI:         m2 / kg/m2          Encounter#:          [id number] Blood Pressure:    /                   Department Location: [facility] OR Surgery Study Type:    ANESTHESIA INTRAOPERATIVE FELICIANO Diagnosis/ICD: Atherosclerotic heart disease of native coronary artery with                unstable angina pectoris-I25.110 Indication:    Angina, Unstable CPT Code:      FELICIANO Complete-08385; Doppler Limited-73178; Color Doppler-22753 PHYSICIAN INTERPRETATION: FELICIANO Details: Technically adequate omniplane transesophageal echocardiogram performed. Left Ventricle: Left ventricular ejection fraction is normal, by visual estimate at 65%. There are no regional wall motion abnormalities. The left ventricular cavity size is decreased. The left ventricular septal wall thickness is mildly increased. There is mildly increased left ventricular posterior wall thickness. There is mild concentric left ventricular hypertrophy. Left ventricular diastolic filling was not assessed. There are no regional wall motion abnormalities and the LV appears underfilled. Left Atrium: The left atrium is mildly dilated. There is no evidence of a patent foramen ovale. There is no thrombus visualized in the left atrial appendage. The LA measures 48 mm in the minor axis consistent with mild dilation.  Right Ventricle: The right ventricle is normal in size. There is normal right ventricular global systolic function. Right Atrium: The right atrium is mildly dilated. The RA measures 47 mm in the minor axis consistent with mild RA chamber enlargement. Aortic Valve: The aortic valve appears abnormal. There is evidence of mild aortic valve stenosis. There is mild aortic valve regurgitation. The left coronary cusp is severely restricted although there does not appear to be a severe amount of calcium on this cusp. There is mild aortic valve stenosis with peak and mean gradients of 20 and 9 mmHg, respectively. There is mild aortic insufficiency with a vena contracta width of 0.27 cm. The aortic valve pressure half time is 741 msec consistent with mild AI. Mitral Valve: The mitral valve is normal in structure. There is trace mitral valve regurgitation which is centrally directed. Tricuspid Valve: The tricuspid valve is structurally normal. There is no evidence of tricuspid valve stenosis. There is trace tricuspid regurgitation. The tricuspid valve regurgitant jet is centrally directed. Pulmonic Valve: The pulmonic valve is structurally normal. There is no indication of pulmonic valve regurgitation. Pericardium: There is no pericardial effusion noted. Aorta: The aortic root is abnormal. There is no plaque visualized in the ascending aorta. There is plaque visualized in the transverse aorta, which is classified as a Grade 3 [moderate atheroma >3-5 mm (no mobile/ulcerated component)] atherosclerosis. There is plaque visualized in the descending aorta which is classified as a Grade 2 [mild (focal or diffuse) intimal thickening of 2-3 mm] atherosclerosis. The left coronary cusp of the aortic valve is significantly restricted. Pulmonary Veins: The left upper pulmonary vein is normal. In comparison to the previous echocardiogram(s): Not performed on intraoperative study.  CONCLUSIONS:  1. Left ventricular ejection fraction is  normal, by visual estimate at 65%.  2. Left ventricular cavity size is decreased.  3. There is normal right ventricular global systolic function.  4. The left atrium is mildly dilated.  5. There is No tricuspid stenosis.  6. Aortic valve appears abnormal.  7. Mild aortic valve stenosis.  8. Mild aortic valve regurgitation.  9. There is no evidence of a patent foramen ovale. 10. There is plaque visualized in the descending aorta. 11. There is plaque visualized in the transverse aorta. POST CARDIOPULMONARY BYPASS REPORT: Patient is being paced. The patient is on norepinephrine 0.02 mcg/kg/min. Global LV function is normal. The LV appears underfilled. Global RV function is normal. There is mild aortic regurgitation. There is a central jet of AI with peak and mean gradients of 20 and 7 mmHg respectively. There is trace mitral regurgitation. There is mild tricuspid regurgitation. No evidence of post aortic cannulation dissection. The post-CPB images were discussed with Dr. DORY Kee.  QUANTITATIVE DATA SUMMARY: 2D MEASUREMENTS:                Normal Ranges: IVSd:  0.99 cm (0.6-1.1cm) LVPWd: 1.14 cm (0.6-1.1cm) LVIDd: 3.83 cm (3.9-5.9cm) AORTA MEASUREMENTS:                      Normal Ranges: AoV Nichelle,s:   2.04 cm (1.4-2.6cm) Ao Sinus, s: 2.98 cm Ao STJ, s:   2.61 cm LV SYSTOLIC FUNCTION BY 2D PLANIMETRY (MOD):                      Normal Ranges: EF-Visual:      65 % LV EF Reported: 65 % AORTIC VALVE:                            Normal Ranges: LVOT Diameter:    1.88 cm  (1.8-2.4cm) AoV Area, planim: 1.81 cm2 (2.5-4.5cm2)  RIGHT VENTRICLE: TAPSE: 18.4 mm  [id number] Willem Schwarz MD Electronically signed on 7/2/2024 at 2:20:36 PM  ** Final **      Additional Labs:    This patient has a central line   Reason for the central line remaining today? Hemodynamic monitoring    This patient has a urinary catheter   Reason for the urinary catheter remaining today? critically ill patient who need accurate urinary output measurements    This  patient is intubated   Reason for patient to remain intubated today? Intubation unnecessary, will extubate today       Past Cardiac Tests:  EKG:  ECG 12 lead (Clinic Performed) 06/24/2024    Echo:  Transthoracic Echo (TTE) Complete 10/10/2023    Ejection Fractions:  EF   Date/Time Value Ref Range Status   07/02/2024 08:18 AM 65 %      Cath:  Cardiac Catheterization Procedure 05/01/2024    Stress Test:  Nuclear Stress Test 01/26/2023    Cardiac Imaging:  MR cardiac w and wo IV contrast w regadenoson stress for MORPH FUNCT and valve DZ 04/05/2024      Assessment /Plan    Ms Rubi 68 y/o female with past medical history significant for CAD, HTN, HLD, TYLER, GERD, CVA on Plavix,  NIDDM II, Anemia. Work up for chest pain revealed  Multivessel disease including in stent re-stenosis of the circumflex and proximal LAD. She is admitted to ICU s/p Coronary Artery Bypass Graft x2 ; LIMA --> LAD, EVH RA --> RAMUS --> CX  EVH Right Radial Artery; EVH Right Saphenous Vein; CABG x 3 LIMA to LAD 26 ml/min, PI 3.6  Radial To Ramus, sequenced to Circ 26 ml/min,PI 1.6; Posterior Pericardial Window with Dr Kee    NEURO: Patient is intubated and sedated on propofol infusion. Expected acute postop pain, CVA on Plavix  - Serial neuro and pain assessments   - wean sedation for extubation once hemodynamically stable  - Prn dilaudid for pain  - Scheduled Tylenol   - Lidoderm patches  - PT/OT Consult, OOB to chair once extubated, MITT precautions     CV:  HTN, HLD, CAD with MVD       S/p Coronary Artery Bypass Graft x2 ; LIMA --> LAD, EVH RA --> RAMUS --> CX   LV function EF 65% pre and post  - Maintain MAP goal MAP 65-90   - Norepinephrine and Propofol  - maintain & monitor CTs, CT to wall sxn @ -20cm  - pacing wires  DDD @ 50 back up   - asa/ statin , BB when hemodynamics permit  - Hold home Enalapril, Amlodipine, Isosorbide,     PULM:  Currently intubated on ventilator  Postop CXR negative for acute process    - Begin CPAP trials and  extubate when criteria met  - Wean FiO2 maintaining SpO2 >92%.   - ABGs PRN  - Acapella, IS once extubated    : No history of renal disease, baseline creatinine ~0.9.   - Continue plasencia catheter for strict I/Os.    - Check renal function panel and replete electrolytes as needed.  - Maintain a potassium > 4, magnesium > 2.    GI:  GERD  - NPO  - Swallow eval prior to PO  - Bowel regimen- senokot and miralax  - Zofran PRN     HEME:  Acute on chronic post op blood loss anemia     - Monitor drain output volume and characteristics  - CBC, coags, and fibrinogen as clinically indicated  - Start 81mg ASA  - SCDs for DVT prophylaxis     ENDO:  NIDDM II, A1c 6.1(6/24)  - Maintain BG <180, insulin SSI per cardiac surgery protocol.  - Hold home Metformin and Jardiance  ID:  Afebrile, no current indications of infection. MRSA negative.  - Periop Ancef 2g x 48 hours      Dispo: Admit to CTICU    This critically ill patient continues to be at-risk for clinically significant deterioration / failure due to the above mentioned dysfunctional, unstable organ systems.  I have personally identified and managed all complex critical care issues with efforts to prevent aforementioned clinical deterioration.  Critical care time is spent at bedside and/or the immediate area and has included, but is not limited to, the review of diagnostic tests, labs, radiographs, serial assessments of hemodynamics, respiratory status, ventilatory management, and family updates.  Time spent in procedures and teaching are reported separately.  Time: 120 minutes  Urvashi Castaneda, APRN-CNP

## 2024-07-02 NOTE — ANESTHESIA PROCEDURE NOTES
Airway  Date/Time: 7/2/2024 8:24 AM  Urgency: elective    Airway not difficult    Staffing  Performed: MARY   Authorized by: Willem Schwarz MD    Performed by: MARY Duarte  Patient location during procedure: OR    Indications and Patient Condition  Indications for airway management: anesthesia  Spontaneous ventilation: present  Sedation level: deep  Preoxygenated: yes  Patient position: sniffing  Mask difficulty assessment: 2 - vent by mask + OA or adjuvant +/- NMBA    Final Airway Details  Final airway type: endotracheal airway      Successful airway: ETT  Cuffed: yes   Successful intubation technique: direct laryngoscopy  Facilitating devices/methods: intubating stylet  Endotracheal tube insertion site: oral  Blade: Moy  Blade size: #3  ETT size (mm): 7.0  Cormack-Lehane Classification: grade III - view of epiglottis only  Placement verified by: chest auscultation and capnometry   Measured from: teeth  ETT to teeth (cm): 21  Number of attempts at approach: 1

## 2024-07-03 ENCOUNTER — APPOINTMENT (OUTPATIENT)
Dept: RADIOLOGY | Facility: HOSPITAL | Age: 69
DRG: 235 | End: 2024-07-03
Payer: MEDICARE

## 2024-07-03 LAB
ALBUMIN SERPL BCP-MCNC: 3.5 G/DL (ref 3.4–5)
ANION GAP BLDA CALCULATED.4IONS-SCNC: 12 MMO/L (ref 10–25)
ANION GAP SERPL CALC-SCNC: 10 MMOL/L (ref 10–20)
ATRIAL RATE: 74 BPM
BASE EXCESS BLDA CALC-SCNC: 0.7 MMOL/L (ref -2–3)
BLOOD EXPIRATION DATE: NORMAL
BODY TEMPERATURE: 37 DEGREES CELSIUS
BUN SERPL-MCNC: 16 MG/DL (ref 6–23)
CA-I BLD-SCNC: 1.26 MMOL/L (ref 1.1–1.33)
CA-I BLDA-SCNC: 1.3 MMOL/L (ref 1.1–1.33)
CALCIUM SERPL-MCNC: 8.6 MG/DL (ref 8.6–10.3)
CHLORIDE BLDA-SCNC: 107 MMOL/L (ref 98–107)
CHLORIDE SERPL-SCNC: 108 MMOL/L (ref 98–107)
CO2 SERPL-SCNC: 27 MMOL/L (ref 21–32)
CREAT SERPL-MCNC: 0.8 MG/DL (ref 0.5–1.05)
DISPENSE STATUS: NORMAL
EGFRCR SERPLBLD CKD-EPI 2021: 79 ML/MIN/1.73M*2
ERYTHROCYTE [DISTWIDTH] IN BLOOD BY AUTOMATED COUNT: 17.2 % (ref 11.5–14.5)
GLUCOSE BLD MANUAL STRIP-MCNC: 127 MG/DL (ref 74–99)
GLUCOSE BLD MANUAL STRIP-MCNC: 150 MG/DL (ref 74–99)
GLUCOSE BLD MANUAL STRIP-MCNC: 151 MG/DL (ref 74–99)
GLUCOSE BLD MANUAL STRIP-MCNC: 160 MG/DL (ref 74–99)
GLUCOSE BLD MANUAL STRIP-MCNC: 180 MG/DL (ref 74–99)
GLUCOSE BLD MANUAL STRIP-MCNC: 183 MG/DL (ref 74–99)
GLUCOSE BLDA-MCNC: 241 MG/DL (ref 74–99)
GLUCOSE SERPL-MCNC: 212 MG/DL (ref 74–99)
HCO3 BLDA-SCNC: 26 MMOL/L (ref 22–26)
HCT VFR BLD AUTO: 24.8 % (ref 36–46)
HCT VFR BLD EST: 28 % (ref 36–46)
HGB BLD-MCNC: 9 G/DL (ref 12–16)
HGB BLDA-MCNC: 9.4 G/DL (ref 12–16)
INHALED O2 CONCENTRATION: 40 %
LACTATE BLDA-SCNC: 1.4 MMOL/L (ref 0.4–2)
MAGNESIUM SERPL-MCNC: 2.2 MG/DL (ref 1.6–2.4)
MCH RBC QN AUTO: 29.2 PG (ref 26–34)
MCHC RBC AUTO-ENTMCNC: 36.3 G/DL (ref 32–36)
MCV RBC AUTO: 81 FL (ref 80–100)
NRBC BLD-RTO: 0 /100 WBCS (ref 0–0)
OXYHGB MFR BLDA: 97.7 % (ref 94–98)
P AXIS: 68 DEGREES
P OFFSET: 192 MS
P ONSET: 139 MS
PCO2 BLDA: 44 MM HG (ref 38–42)
PH BLDA: 7.38 PH (ref 7.38–7.42)
PHOSPHATE SERPL-MCNC: 2.8 MG/DL (ref 2.5–4.9)
PLATELET # BLD AUTO: 110 X10*3/UL (ref 150–450)
PO2 BLDA: 132 MM HG (ref 85–95)
POTASSIUM BLDA-SCNC: 3.9 MMOL/L (ref 3.5–5.3)
POTASSIUM SERPL-SCNC: 3.8 MMOL/L (ref 3.5–5.3)
PR INTERVAL: 178 MS
PRODUCT BLOOD TYPE: 5100
PRODUCT CODE: NORMAL
Q ONSET: 228 MS
QRS COUNT: 12 BEATS
QRS DURATION: 74 MS
QT INTERVAL: 424 MS
QTC CALCULATION(BAZETT): 470 MS
QTC FREDERICIA: 455 MS
R AXIS: 14 DEGREES
RBC # BLD AUTO: 3.08 X10*6/UL (ref 4–5.2)
SAO2 % BLDA: 99 % (ref 94–100)
SODIUM BLDA-SCNC: 141 MMOL/L (ref 136–145)
SODIUM SERPL-SCNC: 141 MMOL/L (ref 136–145)
T AXIS: -15 DEGREES
T OFFSET: 440 MS
UNIT ABO: NORMAL
UNIT NUMBER: NORMAL
UNIT RH: NORMAL
UNIT VOLUME: 287
VENTRICULAR RATE: 74 BPM
WBC # BLD AUTO: 7.7 X10*3/UL (ref 4.4–11.3)

## 2024-07-03 PROCEDURE — 83735 ASSAY OF MAGNESIUM: CPT | Performed by: NURSE PRACTITIONER

## 2024-07-03 PROCEDURE — 71045 X-RAY EXAM CHEST 1 VIEW: CPT

## 2024-07-03 PROCEDURE — 70450 CT HEAD/BRAIN W/O DYE: CPT

## 2024-07-03 PROCEDURE — 2500000004 HC RX 250 GENERAL PHARMACY W/ HCPCS (ALT 636 FOR OP/ED): Performed by: STUDENT IN AN ORGANIZED HEALTH CARE EDUCATION/TRAINING PROGRAM

## 2024-07-03 PROCEDURE — P9073 PLATELETS PHERESIS PATH REDU: HCPCS

## 2024-07-03 PROCEDURE — 82947 ASSAY GLUCOSE BLOOD QUANT: CPT

## 2024-07-03 PROCEDURE — 2500000004 HC RX 250 GENERAL PHARMACY W/ HCPCS (ALT 636 FOR OP/ED): Performed by: NURSE PRACTITIONER

## 2024-07-03 PROCEDURE — 70450 CT HEAD/BRAIN W/O DYE: CPT | Performed by: RADIOLOGY

## 2024-07-03 PROCEDURE — 2500000004 HC RX 250 GENERAL PHARMACY W/ HCPCS (ALT 636 FOR OP/ED)

## 2024-07-03 PROCEDURE — 82330 ASSAY OF CALCIUM: CPT | Performed by: NURSE PRACTITIONER

## 2024-07-03 PROCEDURE — 2020000001 HC ICU ROOM DAILY

## 2024-07-03 PROCEDURE — 84132 ASSAY OF SERUM POTASSIUM: CPT

## 2024-07-03 PROCEDURE — 71045 X-RAY EXAM CHEST 1 VIEW: CPT | Performed by: RADIOLOGY

## 2024-07-03 PROCEDURE — 94667 MNPJ CHEST WALL 1ST: CPT

## 2024-07-03 PROCEDURE — 85027 COMPLETE CBC AUTOMATED: CPT | Performed by: NURSE PRACTITIONER

## 2024-07-03 PROCEDURE — 2500000005 HC RX 250 GENERAL PHARMACY W/O HCPCS: Performed by: STUDENT IN AN ORGANIZED HEALTH CARE EDUCATION/TRAINING PROGRAM

## 2024-07-03 PROCEDURE — 36430 TRANSFUSION BLD/BLD COMPNT: CPT

## 2024-07-03 PROCEDURE — 84132 ASSAY OF SERUM POTASSIUM: CPT | Performed by: NURSE PRACTITIONER

## 2024-07-03 PROCEDURE — 2500000002 HC RX 250 W HCPCS SELF ADMINISTERED DRUGS (ALT 637 FOR MEDICARE OP, ALT 636 FOR OP/ED): Performed by: NURSE PRACTITIONER

## 2024-07-03 PROCEDURE — 37799 UNLISTED PX VASCULAR SURGERY: CPT | Performed by: NURSE PRACTITIONER

## 2024-07-03 PROCEDURE — 94003 VENT MGMT INPAT SUBQ DAY: CPT

## 2024-07-03 PROCEDURE — 30233R1 TRANSFUSION OF NONAUTOLOGOUS PLATELETS INTO PERIPHERAL VEIN, PERCUTANEOUS APPROACH: ICD-10-PCS | Performed by: STUDENT IN AN ORGANIZED HEALTH CARE EDUCATION/TRAINING PROGRAM

## 2024-07-03 PROCEDURE — C9248 INJ, CLEVIDIPINE BUTYRATE: HCPCS

## 2024-07-03 PROCEDURE — 99291 CRITICAL CARE FIRST HOUR: CPT | Performed by: STUDENT IN AN ORGANIZED HEALTH CARE EDUCATION/TRAINING PROGRAM

## 2024-07-03 RX ORDER — HYDROMORPHONE HYDROCHLORIDE 0.2 MG/ML
0.2 INJECTION INTRAMUSCULAR; INTRAVENOUS; SUBCUTANEOUS EVERY 2 HOUR PRN
Status: DISCONTINUED | OUTPATIENT
Start: 2024-07-03 | End: 2024-07-03

## 2024-07-03 RX ORDER — HYDROMORPHONE HYDROCHLORIDE 0.2 MG/ML
0.2 INJECTION INTRAMUSCULAR; INTRAVENOUS; SUBCUTANEOUS
Status: DISCONTINUED | OUTPATIENT
Start: 2024-07-03 | End: 2024-07-07

## 2024-07-03 RX ORDER — POTASSIUM CHLORIDE 14.9 MG/ML
20 INJECTION INTRAVENOUS ONCE
Status: COMPLETED | OUTPATIENT
Start: 2024-07-03 | End: 2024-07-03

## 2024-07-03 RX ORDER — METOPROLOL TARTRATE 1 MG/ML
5 INJECTION, SOLUTION INTRAVENOUS EVERY 6 HOURS
Status: DISCONTINUED | OUTPATIENT
Start: 2024-07-03 | End: 2024-07-04

## 2024-07-03 RX ORDER — POTASSIUM CHLORIDE 20 MEQ/1
20 TABLET, EXTENDED RELEASE ORAL ONCE
Status: DISCONTINUED | OUTPATIENT
Start: 2024-07-03 | End: 2024-07-03

## 2024-07-03 RX ORDER — NITROGLYCERIN 20 MG/100ML
5 INJECTION INTRAVENOUS CONTINUOUS
Status: DISCONTINUED | OUTPATIENT
Start: 2024-07-03 | End: 2024-07-04

## 2024-07-03 RX ORDER — METOPROLOL TARTRATE 25 MG/1
25 TABLET, FILM COATED ORAL 2 TIMES DAILY
Status: DISCONTINUED | OUTPATIENT
Start: 2024-07-03 | End: 2024-07-03

## 2024-07-03 RX ORDER — HYDROMORPHONE HYDROCHLORIDE 0.2 MG/ML
0.2 INJECTION INTRAMUSCULAR; INTRAVENOUS; SUBCUTANEOUS ONCE
Status: COMPLETED | OUTPATIENT
Start: 2024-07-03 | End: 2024-07-03

## 2024-07-03 ASSESSMENT — PAIN - FUNCTIONAL ASSESSMENT
PAIN_FUNCTIONAL_ASSESSMENT: CPOT (CRITICAL CARE PAIN OBSERVATION TOOL)

## 2024-07-03 ASSESSMENT — PAIN SCALES - GENERAL
PAINLEVEL_OUTOF10: 0 - NO PAIN
PAINLEVEL_OUTOF10: 6
PAINLEVEL_OUTOF10: 3
PAINLEVEL_OUTOF10: 7

## 2024-07-03 ASSESSMENT — ACTIVITIES OF DAILY LIVING (ADL): LACK_OF_TRANSPORTATION: NO

## 2024-07-03 NOTE — PROGRESS NOTES
07/03/24 1151   Discharge Planning   Living Arrangements Alone   Support Systems Family members   Assistance Needed Independent, drives   Type of Residence Private residence   Number of Stairs to Enter Residence 4   Number of Stairs Within Residence 0   Do you have animals or pets at home? No   Who is requesting discharge planning? Provider   Home or Post Acute Services In home services   Type of Home Care Services Home nursing visits;Home OT;Home PT   Patient expects to be discharged to: Home with HHC   Does the patient need discharge transport arranged? Yes   RoundTrip coordination needed? Yes   Has discharge transport been arranged? No   Financial Resource Strain   How hard is it for you to pay for the very basics like food, housing, medical care, and heating? Not hard   Housing Stability   In the last 12 months, was there a time when you were not able to pay the mortgage or rent on time? N   In the last 12 months, how many places have you lived? 1   In the last 12 months, was there a time when you did not have a steady place to sleep or slept in a shelter (including now)? N   Transportation Needs   In the past 12 months, has lack of transportation kept you from medical appointments or from getting medications? no   In the past 12 months, has lack of transportation kept you from meetings, work, or from getting things needed for daily living? No   Patient Choice   Provider Choice list and CMS website (https://medicare.gov/care-compare#search) for post-acute Quality and Resource Measure Data were provided and reviewed with: Family   Patient / Family choosing to utilize agency / facility established prior to hospitalization No         Patient is s/p cabg yesterday and post surgery she has acute metabolic encephalopathy. At one point question was if she had stroke, but CT of head was negative for acute findings. Patient is unable to answer questions. Patient's brother Celio called ( [phone] ) and spoke to him  about his sister. He stated she lives alone in the house. She is independent with her care at home. She drives. Patient is not using any ambulatory devices. No oxygen in use at home, no HD. Her PCP is Dr. Pretty Link. Pharmacy she uses is [facility] in [facility]. Explained to patient's brother about HHC post cardiac surgery and going home. Celio agreed and he stated [facility] is ok for choice. Will continue to follow for discharge needs.

## 2024-07-03 NOTE — SIGNIFICANT EVENT
Dr Edith Anaya at bedside to see pt for bleeding from R arm graft site. Earlier today Dr Kee had opened the site and applied a clip to the radial stump. Dressing was reinforced this evening by Dr Edith Anaya's fellow at bedside. No new bleeding noted from site and radial pulses present distal to the harvest site in her right arm. Following discussion with Dr Edith Anaya plan is for no surgical intervention at this time and close monitoring of the harvest site for now. Okay for CPAP trial and extubation when awake as discussed with Dr Edith Anaya. Bracey site at this time feels soft with intact distal pulses and no significant new bleeding noted.

## 2024-07-03 NOTE — PROGRESS NOTES
Physical Therapy                 Therapy Communication Note    Patient Name: Rita Rubi  MRN: [id number]  Today's Date: 7/3/2024     Discipline: Physical Therapy    Missed Visit Reason: Missed Visit Reason: Patient placed on medical hold (PT consult received, performed chart review. Per conversation with RN and OT, pt fairly encephalopathic, not appropriate for PT evaluation, requesting hold until 7/4. Notified physician.)    Missed Time: Attempt

## 2024-07-03 NOTE — SIGNIFICANT EVENT
Pt extubated to NC following passage of her SBT. Pt has history of L Sided weakness d/t prior right corona radiata infarct from September 2023. Her weakness following her CVA was reported to be 4/5 on her left side and per collateral information from her sister at bedside she had near full strength in her left side following rehab. On assessment post extubation her L sided strength was noted to be 1/5 of both her left leg and arm. She was aphasic and unable to talk. Sedation has been off for nearly 3 hrs and she had full strength in her R side. No appreciable facial drooping was noted. We will take her for STAT CT head to R/O an intracranial bleed. Given her recent surgery, should their be a new ischemic stroke she would not be a TNK candidate.       Update:  0210: Upon returning from CT pt had mild improvement in her L sided strengths now 2/5 in her L arm and leg. Moreover she is now interactive and conversing clearly without any dysarthria. currently A&O x2 and disoriented to time only. Her CT imaging of her head showed no acute process. We will continue close monitoring of her neurological status.      Narrative & Impression   Interpreted By:  George Galdamez,   STUDY:  CT HEAD WO IV CONTRAST;  7/3/2024 1:46 am      INDICATION:  Signs/Symptoms:AMS.      COMPARISON:  CT head 09/12/2023      ACCESSION NUMBER(S):  [id number]      ORDERING CLINICIAN:  RADU MCCORMICK      TECHNIQUE:  Noncontrast axial CT images of head were obtained with coronal and  sagittal reconstructed images.      FINDINGS:  BRAIN PARENCHYMA: Chronic lacunar infarct along the right internal  capsule extending into the corona radiata. No acute large territory  infarction or transcortical edema evident by CT. No mass-effect,  midline shift or effacement of cerebral sulci. Patchy periventricular  and subcortical white matter hypodensities, nonspecific but often  seen in the setting of chronic microangiopathic change.      HEMORRHAGE: No acute  intracranial hemorrhage.      VENTRICLES and EXTRA-AXIAL SPACES: The ventricles and sulci are  within normal limits for brain volume. No abnormal extra-axial fluid  collection.      ORBITS: The visualized orbits and globes are within normal limits.      EXTRACRANIAL SOFT TISSUES: Within normal limits.      PARANASAL SINUSES/MASTOIDS: The visualized paranasal sinuses and  mastoid air cells are well aerated.      CALVARIUM: No depressed skull fracture.          IMPRESSION:  1. No acute intracranial abnormality identified.  2. Chronic white matter changes likely reflecting sequela of  small-vessel ischemic disease.  3. Chronic lacunar infarct in the right internal capsule/corona  radiata          If there is concern for superimposed acute ischemia or other  underlying abnormality then MRI may be performed in further  assessment.      MACRO:  None      Signed by: George Galdamez 7/3/2024 2:00 AM  Dictation workstation:   [id number]

## 2024-07-03 NOTE — PROGRESS NOTES
Occupational Therapy                 Therapy Communication Note    Patient Name: Rita Rubi  MRN: [id number]  Today's Date: 7/3/2024     Discipline: Occupational Therapy    Missed Visit Reason: Missed Visit Reason: Patient placed on medical hold (per nursing pt not appropriate this date for  therapy)    Missed Time: Attempt

## 2024-07-03 NOTE — PROGRESS NOTES
"Rita Rubi is a 69 y.o. female on day 1 of admission presenting with Coronary artery disease involving native coronary artery of native heart.    Subjective   [place] site soft overnight, gauze dressing saturated overnight, compression removed this morning  CT head completed last night d/t worsening        Objective     Physical Exam  Constitutional:       General: She is not in acute distress.     Appearance: She is obese.   Eyes:      Pupils: Pupils are equal, round, and reactive to light.      Comments: No apparent gaze deviation   Cardiovascular:      Rate and Rhythm: Normal rate and regular rhythm.   Pulmonary:      Effort: Pulmonary effort is normal.      Breath sounds: Normal breath sounds.      Comments: Cts with minimal dark thin serosanguinous drainage no air leak noted  Abdominal:      General: There is no distension.      Palpations: Abdomen is soft.      Tenderness: There is no abdominal tenderness.   Skin:     General: Skin is warm and dry.      Comments: Well approximated MSI stable to palpation no erythema edema or purulence   Neurological:      Mental Status: She is alert.      Comments: Encephalopathic, intermittently follows simple commands no multi-step commands, nonsensical speech. A&Ox0. Unable to assess strength/coordination d/t mental status but moves all extremities spontaneously and withdraws to pain from all 4.       Last Recorded Vitals  Blood pressure 108/61, pulse 88, temperature 37.8 °C (100 °F), resp. rate 16, height 1.689 m (5' 6.5\"), weight 103 kg (227 lb 8.2 oz), SpO2 100%.  Intake/Output last 3 Shifts:  I/O last 3 completed shifts:  In: 3574 (37.4 mL/kg) [Blood:944; IV Piggyback:2630]  Out: 2550 (26.7 mL/kg) [Urine:2030 (0.6 mL/kg/hr); Blood:250; Chest Tube:270]  Weight: 95.5 kg     Relevant Results  Scheduled medications  acetaminophen, 650 mg, oral, q6h  aspirin, 81 mg, oral, Daily  atorvastatin, 80 mg, oral, Nightly  ceFAZolin, 2 g, intravenous, q8h  enoxaparin, 40 mg, " subcutaneous, q24h  insulin lispro, 0-15 Units, subcutaneous, q4h  polyethylene glycol, 17 g, oral, Daily  sennosides-docusate sodium, 2 tablet, oral, BID  tranexamic acid, 5,000 mg, intravenous, Once      Continuous medications  clevidipine, 0-16 mg/hr, Last Rate: 6 mg/hr (07/03/24 0514)  lactated Ringer's, 5 mL/hr, Last Rate: 5 mL/hr (07/02/24 2036)  norepinephrine, 0-0.5 mcg/kg/min, Last Rate: Stopped (07/02/24 1552)      PRN medications  PRN medications: alteplase, dextrose **OR** glucagon, HYDROmorphone, naloxone, oxyCODONE, oxyCODONE, oxygen    LABS:  CMP:  Results from last 7 days   Lab Units 07/03/24  0523 07/02/24  1510   SODIUM mmol/L 141 143   POTASSIUM mmol/L 3.8 4.1   CHLORIDE mmol/L 108* 108*   CO2 mmol/L 27 27   ANION GAP mmol/L 10 12   BUN mg/dL 16 16   CREATININE mg/dL 0.80 0.99   EGFR mL/min/1.73m*2 79 61   MAGNESIUM mg/dL 2.20 3.10*   ALBUMIN g/dL 3.5 3.3*     CBC:  Results from last 7 days   Lab Units 07/03/24  0523 07/02/24  1510   WBC AUTO x10*3/uL 7.7 8.5   HEMOGLOBIN g/dL 9.0* 9.0*   HEMATOCRIT % 24.8* 25.1*   PLATELETS AUTO x10*3/uL 110* 91*   MCV fL 81 79*     COAG:   Results from last 7 days   Lab Units 07/02/24  1510   INR  1.2*     HEME/ENDO:     CARDIAC:                Assessment/Plan   Principal Problem:    Coronary artery disease involving native coronary artery of native heart  Active Problems:    Coronary artery disease involving native coronary artery of native heart with unstable angina pectoris (Multi)    Ms Rubi 68 y/o female with past medical history significant for CAD, HTN, HLD, TYLER, GERD, CVA on Plavix,  NIDDM II, Anemia. Work up for chest pain revealed  Multivessel disease including in stent re-stenosis of the circumflex and proximal LAD. She is admitted to ICU s/p Coronary Artery Bypass Graft x2 ; LIMA --> LAD, EVH RA --> RAMUS --> CX  EVH Right Radial Artery; EVH Right Saphenous Vein; CABG x 3 LIMA to LAD 26 ml/min, PI 3.6  Radial To Ramus, sequenced to Circ 26 ml/min,PI  1.6; Posterior Pericardial Window with Dr Kee     NEURO:  # Acute metabolic encephalopathy - suspect postoperative i/s/o recent large Right CVA  # Expected acute postop pain  # H/O recent Right MCA CVA with residual Lt sided deficit (DAPT)  CT head negative for acute/subacute infarcts, redemonstration of prior chronic Rt MCA infarct  - Serial neuro and pain assessments   - Analgesia: scheduled Tylenol, PRN Dilaudid while Cts in place, Lidoderm patches  - PRN Oxy held d/t NPO status  - PT/OT/MITT  - Delirium precautions, sleep hygiene   -> Sitting up in bed HOB > 40   -> Lights, TV/music/conversation on; blinds open   -> Avoid narcotics if possible   -> Sleep/wake cycle  - No MRI at this point per Dr. Kee    CV:  # MVD with in-stent restenosis s/p CABGx2, PPW  # HTN, HLD  LVEF 65% pre/post  AV wires backup DDD 60  - Cont EKG, NIBP  - Titrate Cleviprex to maintain MAP goal MAP 65-90   - Maintain & monitor CTs, CT to wall sxn @ -20cm. Remove later today if appropriate  - ASA/statin  - IV BB q6 with hold parameters while NPO  - Hold home Plavix (CVA) for now  - Hold home Enalapril, Amlodipine, Imdur. Resume as Hds permit     PULM:  # TYLER  Postop CXR negative for acute process  - Wean FiO2 maintaining SpO2 >92%  - Acapella, IS     :  # Preserved renal function (baseline Cr ~0.9)  - RFP daily, replace lytes prn  - Remove Lawrence today if appropriate. Strict I&Os  - Maintain K > 4, Mg > 2     GI:  # GERD  - NPO; failed BSE given mental status  - Bowel regimen- senokot and miralax  - Zofran PRN     HEME:  # Acute on chronic post op blood loss anemia (baseline Hgb ~9.0) and thrombocytopenia - stable  # Postop RUE hematoma surrounding radial harvest site  7/2 POD0 bedside exploration of RUE hematoma involving harvest site  - 1u platelets per Dr. Kee  - CBC daily  - DVTppx: SCDs, Lovenox  - Neurovascular assessment of radial graft harvest q1  - Radial graft site care  -> Maintain loose gauze wrap over RUE exploration  site   -> Notify ICU/CTS RAMSES if new or worsening firmness, bleeding, or loss of pulse ox or pulses   -> Elevation above heart if possible     ENDO:  # NIDDM2  A1c 6.1  - Maintain BG <180  - POCT BG, ISS q4  - Hold home Metformin and Jardiance for now given NPO    ID:  Afebrile, no current indications of infection  MRSA negative  Periop abx  - Trend temp, WBCs    Discussed with Dr. Kee via telephone.     Dispo: CTICU, transfer to SDU if appropriate this afternoon       I spent 60 minutes in the professional and overall critical care of this patient.      Ariane Forbes PA-C

## 2024-07-03 NOTE — CONSULTS
Nutrition Assessment Note  Nutrition Assessment      Reason for Assessment  Reason for Assessment: Admission nursing screening  Admitted for CAD, POD#1 CABG x3, remains encephalopathic and unable to follow commands.  MST score for unsure weight loss, no significant weight loss identified.   Is at risk for malnutrition r/t NPO status and increased nutrient needs post-op.    History:  Food and Nutrient History  Food and Nutrient History: unable to obtain    Diagnosis   Primary hypertension   Diabetes mellitus type 2 in nonobese (Multi)   Adhesive capsulitis of both shoulders   Allergic rhinitis   Anemia   Hyperlipidemia   TYLER (obstructive sleep apnea)   Obesity   S/P angioplasty with stent   Status post coronary angioplasty   S/P gastric bypass   Stuttering   Atypical chest pain   Cerebral infarction due to thrombosis of right middle cerebral artery (Multi)   CVA (cerebral vascular accident) (Multi)   Diabetes mellitus (Multi)   Diet-controlled type 2 diabetes mellitus (Multi)   Dysphagia   Expressive aphasia   Hemiparesis of left nondominant side as late effect of cerebral infarction (Multi)   Postinflammatory hyperpigmentation   Other specified epidermal thickening   Skin lesion   Xerosis cutis   Hypercholesteremia   Atherosclerotic cardiovascular disease   Benign essential hypertension   HTN (hypertension)   Coronary artery disease involving native coronary artery with other form of angina pectoris, unspecified whether native or transplanted heart (CMS-HCC)   Asymptomatic menopause   Ischemic stroke (Multi)   Hospital discharge follow-up   Acute ischemic stroke (Multi)   Hypercalcemia   Colon cancer screening   Screening cholesterol level   Diabetes mellitus screening   Health care maintenance   Mass of right breast   Abnormal mammogram   Coronary artery disease involving native coronary artery of native heart   Coronary artery disease involving native coronary artery of native heart with unstable angina pectoris  "(Multi)    Anthropometrics:  Height: 168.9 cm (5' 6.5\")  Weight: 103 kg (227 lb 8.2 oz)  BMI (Calculated): 36.18  Weight Change  Weight History / % Weight Change: 9/14/23: 91.6 kg, prior to CVA.  Significant Weight Loss: No  IBW/kg (Dietitian Calculated): 61.4 kg   Amputation Calculations:  BMI Amputation Adjustment: No    Energy Needs:  Calculated Energy Needs Using Equations  Height: 168.9 cm (5' 6.5\")  Minute Ventilation (L/min): 8 L/min  Temp: (!) 38.1 °C (100.6 °F)    Estimated Energy Needs  Method for Estimating Needs: 6794-3406 @ 19-21 kcal/kg    Estimated Protein Needs  Method for Estimating Needs: 74-86 @ 1.2-1.4 gr/kg IBW    Estimated Fluid Needs  Total Fluid Estimated Needs (mL): 1535 mL  Total Fluid Estimated Needs (mL/kg): 25 mL/kg     Dietary Orders  NPO Diet; Effective now    Oral nutritional supplements - [place]    Comments: Twice a day.    Nutrition Focused Physical Findings:   Patient n/a    Nutrition Diagnosis   Malnutrition Diagnosis  Patient has Malnutrition Diagnosis: No    Patient has Nutrition Diagnosis: Yes  Nutrition Diagnosis 1: Inadequate oral intake  Diagnosis Status (1): New  Related to (1): s/p CABG, encephalopathic  As Evidenced by (1): NPO     Nutrition Interventions/Recommendations   Nutrition Prescription  Individualized Nutrition Prescription Provided for : Monitor for cognitive improvements over next 48 hours.  Failed first bedside swallow evaluation.  If NPO>5 days, consider alternative nutrition support.  MD to manage IVFs.    Food and/or Nutrient Delivery Interventions    Parenteral Nutrition/IV Fluids: IV fluid delivery     Nutrition Monitoring and Evaluation   Food and Nutrient Related History   For diet to be advanced.     Anthropometrics: Body Composition/Growth/Weight History  Weight: Measured weight  Criteria: daily    Weight Change: Weight change percentage  Criteria: weekly    Biochemical Data, Medical Tests and Procedures  Electrolyte and Renal Panel: BUN, Calcium, " serum, Chloride, Creatinine, Magnesium, Phosphorus, Potassium, Sodium  Criteria: as indicated  Glucose/Endocrine Profile: Glucose, casual, Glucose, fasting  Criteria: as indicated  Nutritional Anemia Profile: Folate, serum, Hematocrit, Hemoglobin, Iron, serum  Criteria: as indicated    Nutrition Focused Physical Findings   Digestive System: Constipation, Diarrhea, Nausea, Vomiting  Criteria: daily    Follow Up  Last Date of Nutrition Visit: 07/03/24  Nutrition Follow-Up Needed?: Dietitian to reassess per policy  Follow up Comment: NPO-TR

## 2024-07-04 ENCOUNTER — APPOINTMENT (OUTPATIENT)
Dept: RADIOLOGY | Facility: HOSPITAL | Age: 69
DRG: 235 | End: 2024-07-04
Payer: MEDICARE

## 2024-07-04 LAB
ALBUMIN SERPL BCP-MCNC: 3.1 G/DL (ref 3.4–5)
ANION GAP SERPL CALC-SCNC: 11 MMOL/L (ref 10–20)
BLOOD EXPIRATION DATE: NORMAL
BUN SERPL-MCNC: 17 MG/DL (ref 6–23)
CALCIUM SERPL-MCNC: 8.8 MG/DL (ref 8.6–10.3)
CHLORIDE SERPL-SCNC: 108 MMOL/L (ref 98–107)
CO2 SERPL-SCNC: 27 MMOL/L (ref 21–32)
CREAT SERPL-MCNC: 0.78 MG/DL (ref 0.5–1.05)
DISPENSE STATUS: NORMAL
EGFRCR SERPLBLD CKD-EPI 2021: 81 ML/MIN/1.73M*2
ERYTHROCYTE [DISTWIDTH] IN BLOOD BY AUTOMATED COUNT: 17.5 % (ref 11.5–14.5)
GLUCOSE BLD MANUAL STRIP-MCNC: 150 MG/DL (ref 74–99)
GLUCOSE BLD MANUAL STRIP-MCNC: 155 MG/DL (ref 74–99)
GLUCOSE BLD MANUAL STRIP-MCNC: 159 MG/DL (ref 74–99)
GLUCOSE BLD MANUAL STRIP-MCNC: 172 MG/DL (ref 74–99)
GLUCOSE BLD MANUAL STRIP-MCNC: 206 MG/DL (ref 74–99)
GLUCOSE SERPL-MCNC: 171 MG/DL (ref 74–99)
HCT VFR BLD AUTO: 22 % (ref 36–46)
HGB BLD-MCNC: 7.8 G/DL (ref 12–16)
MAGNESIUM SERPL-MCNC: 2.1 MG/DL (ref 1.6–2.4)
MCH RBC QN AUTO: 29 PG (ref 26–34)
MCHC RBC AUTO-ENTMCNC: 35.5 G/DL (ref 32–36)
MCV RBC AUTO: 82 FL (ref 80–100)
NRBC BLD-RTO: 0 /100 WBCS (ref 0–0)
PHOSPHATE SERPL-MCNC: 2.1 MG/DL (ref 2.5–4.9)
PLATELET # BLD AUTO: 135 X10*3/UL (ref 150–450)
POTASSIUM SERPL-SCNC: 3.9 MMOL/L (ref 3.5–5.3)
PRODUCT BLOOD TYPE: 5100
PRODUCT CODE: NORMAL
RBC # BLD AUTO: 2.69 X10*6/UL (ref 4–5.2)
SODIUM SERPL-SCNC: 142 MMOL/L (ref 136–145)
UNIT ABO: NORMAL
UNIT NUMBER: NORMAL
UNIT RH: NORMAL
UNIT VOLUME: 282
UNIT VOLUME: 282
UNIT VOLUME: 286
UNIT VOLUME: 288
WBC # BLD AUTO: 9.6 X10*3/UL (ref 4.4–11.3)
XM INTEP: NORMAL

## 2024-07-04 PROCEDURE — 2500000002 HC RX 250 W HCPCS SELF ADMINISTERED DRUGS (ALT 637 FOR MEDICARE OP, ALT 636 FOR OP/ED): Performed by: NURSE PRACTITIONER

## 2024-07-04 PROCEDURE — P9016 RBC LEUKOCYTES REDUCED: HCPCS

## 2024-07-04 PROCEDURE — 74018 RADEX ABDOMEN 1 VIEW: CPT | Performed by: RADIOLOGY

## 2024-07-04 PROCEDURE — 99291 CRITICAL CARE FIRST HOUR: CPT

## 2024-07-04 PROCEDURE — 2500000001 HC RX 250 WO HCPCS SELF ADMINISTERED DRUGS (ALT 637 FOR MEDICARE OP): Performed by: NURSE PRACTITIONER

## 2024-07-04 PROCEDURE — 2500000004 HC RX 250 GENERAL PHARMACY W/ HCPCS (ALT 636 FOR OP/ED): Performed by: STUDENT IN AN ORGANIZED HEALTH CARE EDUCATION/TRAINING PROGRAM

## 2024-07-04 PROCEDURE — 80069 RENAL FUNCTION PANEL: CPT | Performed by: NURSE PRACTITIONER

## 2024-07-04 PROCEDURE — 74018 RADEX ABDOMEN 1 VIEW: CPT

## 2024-07-04 PROCEDURE — 2500000004 HC RX 250 GENERAL PHARMACY W/ HCPCS (ALT 636 FOR OP/ED): Performed by: NURSE PRACTITIONER

## 2024-07-04 PROCEDURE — 36430 TRANSFUSION BLD/BLD COMPNT: CPT

## 2024-07-04 PROCEDURE — 71045 X-RAY EXAM CHEST 1 VIEW: CPT

## 2024-07-04 PROCEDURE — 85027 COMPLETE CBC AUTOMATED: CPT | Performed by: NURSE PRACTITIONER

## 2024-07-04 PROCEDURE — 2500000005 HC RX 250 GENERAL PHARMACY W/O HCPCS: Performed by: STUDENT IN AN ORGANIZED HEALTH CARE EDUCATION/TRAINING PROGRAM

## 2024-07-04 PROCEDURE — C9248 INJ, CLEVIDIPINE BUTYRATE: HCPCS

## 2024-07-04 PROCEDURE — 83735 ASSAY OF MAGNESIUM: CPT | Performed by: NURSE PRACTITIONER

## 2024-07-04 PROCEDURE — 82947 ASSAY GLUCOSE BLOOD QUANT: CPT

## 2024-07-04 PROCEDURE — 2500000004 HC RX 250 GENERAL PHARMACY W/ HCPCS (ALT 636 FOR OP/ED)

## 2024-07-04 PROCEDURE — 2020000001 HC ICU ROOM DAILY

## 2024-07-04 PROCEDURE — 71045 X-RAY EXAM CHEST 1 VIEW: CPT | Performed by: RADIOLOGY

## 2024-07-04 PROCEDURE — 37799 UNLISTED PX VASCULAR SURGERY: CPT | Performed by: NURSE PRACTITIONER

## 2024-07-04 PROCEDURE — 2500000004 HC RX 250 GENERAL PHARMACY W/ HCPCS (ALT 636 FOR OP/ED): Mod: JZ | Performed by: NURSE PRACTITIONER

## 2024-07-04 PROCEDURE — 2500000001 HC RX 250 WO HCPCS SELF ADMINISTERED DRUGS (ALT 637 FOR MEDICARE OP)

## 2024-07-04 PROCEDURE — 2500000005 HC RX 250 GENERAL PHARMACY W/O HCPCS: Performed by: NURSE PRACTITIONER

## 2024-07-04 RX ORDER — METOPROLOL TARTRATE 25 MG/1
25 TABLET, FILM COATED ORAL 2 TIMES DAILY
Status: DISCONTINUED | OUTPATIENT
Start: 2024-07-04 | End: 2024-07-04

## 2024-07-04 RX ORDER — FUROSEMIDE 10 MG/ML
40 INJECTION INTRAMUSCULAR; INTRAVENOUS ONCE
Status: COMPLETED | OUTPATIENT
Start: 2024-07-04 | End: 2024-07-04

## 2024-07-04 RX ORDER — TRAMADOL HYDROCHLORIDE 50 MG/1
50 TABLET ORAL EVERY 8 HOURS PRN
Status: DISCONTINUED | OUTPATIENT
Start: 2024-07-04 | End: 2024-07-04

## 2024-07-04 RX ORDER — TRAMADOL HYDROCHLORIDE 50 MG/1
50 TABLET ORAL EVERY 8 HOURS PRN
Status: DISCONTINUED | OUTPATIENT
Start: 2024-07-04 | End: 2024-07-10 | Stop reason: HOSPADM

## 2024-07-04 RX ORDER — METOPROLOL TARTRATE 25 MG/1
25 TABLET, FILM COATED ORAL 2 TIMES DAILY
Status: DISCONTINUED | OUTPATIENT
Start: 2024-07-04 | End: 2024-07-07

## 2024-07-04 RX ORDER — ISOSORBIDE DINITRATE 20 MG/1
20 TABLET ORAL
Status: DISCONTINUED | OUTPATIENT
Start: 2024-07-04 | End: 2024-07-09

## 2024-07-04 RX ORDER — POTASSIUM CHLORIDE 14.9 MG/ML
20 INJECTION INTRAVENOUS ONCE
Status: COMPLETED | OUTPATIENT
Start: 2024-07-04 | End: 2024-07-04

## 2024-07-04 RX ORDER — HYDROMORPHONE HYDROCHLORIDE 0.2 MG/ML
0.2 INJECTION INTRAMUSCULAR; INTRAVENOUS; SUBCUTANEOUS ONCE
Status: COMPLETED | OUTPATIENT
Start: 2024-07-04 | End: 2024-07-04

## 2024-07-04 RX ORDER — ENALAPRIL MALEATE 5 MG/1
20 TABLET ORAL 2 TIMES DAILY
Status: DISCONTINUED | OUTPATIENT
Start: 2024-07-04 | End: 2024-07-10 | Stop reason: HOSPADM

## 2024-07-04 RX ORDER — ENALAPRIL MALEATE 5 MG/1
20 TABLET ORAL 2 TIMES DAILY
Status: DISCONTINUED | OUTPATIENT
Start: 2024-07-04 | End: 2024-07-04

## 2024-07-04 ASSESSMENT — PAIN - FUNCTIONAL ASSESSMENT
PAIN_FUNCTIONAL_ASSESSMENT: CPOT (CRITICAL CARE PAIN OBSERVATION TOOL)

## 2024-07-04 ASSESSMENT — PAIN SCALES - GENERAL
PAINLEVEL_OUTOF10: 6
PAINLEVEL_OUTOF10: 6
PAINLEVEL_OUTOF10: 7
PAINLEVEL_OUTOF10: 1

## 2024-07-04 NOTE — PROGRESS NOTES
Occupational Therapy                 Therapy Communication Note    Patient Name: Rita Rubi  MRN: [id number]  Today's Date: 7/4/2024     Discipline: Occupational Therapy    Missed Visit Reason: Missed Visit Reason: Patient placed on medical hold (therapy consult received, performed chart review. Per conversation with RN, pt with metabolic encephalopathy and continues to not be appropriate for PT/OT evaluations, requesting continued hold this date)    Missed Time: Attempt

## 2024-07-04 NOTE — PROGRESS NOTES
"Rita Rubi is a 69 y.o. female on day 2 of admission presenting with Coronary artery disease involving native coronary artery of native heart.    Subjective   Patient A+O x 0.  Responds to her name and pain.  Does not follow commands.       Objective     Physical Exam  Eyes:      Pupils: Pupils are equal, round, and reactive to light.   Cardiovascular:      Rate and Rhythm: Normal rate and regular rhythm.      Heart sounds: Normal heart sounds.   Pulmonary:      Breath sounds: Normal breath sounds.   Chest:      Comments: Mediastinal incision- DHAVAL, no drainage, no crepitus  CT x 3 with serosanguinous drainage, no air leak  Pacer wires  Abdominal:      General: Bowel sounds are normal.      Palpations: Abdomen is soft.   Genitourinary:     Comments: Lawrence draining clear yellow urine  Musculoskeletal:      Right lower leg: Edema present.      Left lower leg: Edema present.      Comments: B/L upper extremity edema  R UE hematoma evacuation site- sanguinous drainage; gauze & kerlix dressing changed  R radial harvest- dressing C/D/I   Skin:     General: Skin is warm and dry.      Capillary Refill: Capillary refill takes less than 2 seconds.   Neurological:      Mental Status: She is alert. She is disoriented.   Psychiatric:         Mood and Affect: Mood normal.         Behavior: Behavior normal.         Last Recorded Vitals  Blood pressure 153/62, pulse 93, temperature 36.9 °C (98.5 °F), temperature source Temporal, resp. rate 18, height 1.689 m (5' 6.5\"), weight 102 kg (224 lb 6.9 oz), SpO2 98%.  Intake/Output last 3 Shifts:  I/O last 3 completed shifts:  In: 728.4 (7.2 mL/kg) [I.V.:528.4 (5.2 mL/kg); Blood:200]  Out: 1790 (17.6 mL/kg) [Urine:1330 (0.4 mL/kg/hr); Chest Tube:460]  Weight: 101.8 kg     Relevant Results  Scheduled medications  acetaminophen, 650 mg, oral, q6h  aspirin, 81 mg, oral, Daily  atorvastatin, 80 mg, oral, Nightly  ceFAZolin, 2 g, intravenous, q8h  enoxaparin, 40 mg, subcutaneous, q24h  insulin " lispro, 0-15 Units, subcutaneous, q4h  metoprolol, 5 mg, intravenous, q6h  polyethylene glycol, 17 g, oral, Daily  potassium chloride, 20 mEq, intravenous, Once  potassium phosphate, 15 mmol, intravenous, Once  sennosides-docusate sodium, 2 tablet, oral, BID      Continuous medications  clevidipine, 0-16 mg/hr, Last Rate: 6 mg/hr (07/04/24 1110)  lactated Ringer's, 5 mL/hr, Last Rate: 5 mL/hr (07/04/24 0854)  nitroglycerin, 5 mcg/min, Last Rate: 5 mcg/min (07/04/24 0854)      PRN medications  PRN medications: alteplase, dextrose **OR** glucagon, HYDROmorphone, naloxone, [Held by provider] oxyCODONE, [Held by provider] oxyCODONE, oxygen  Results for orders placed or performed during the hospital encounter of 07/02/24 (from the past 24 hour(s))   POCT GLUCOSE   Result Value Ref Range    POCT Glucose 150 (H) 74 - 99 mg/dL   POCT GLUCOSE   Result Value Ref Range    POCT Glucose 160 (H) 74 - 99 mg/dL   POCT GLUCOSE   Result Value Ref Range    POCT Glucose 151 (H) 74 - 99 mg/dL   POCT GLUCOSE   Result Value Ref Range    POCT Glucose 127 (H) 74 - 99 mg/dL   POCT GLUCOSE   Result Value Ref Range    POCT Glucose 159 (H) 74 - 99 mg/dL   Magnesium   Result Value Ref Range    Magnesium 2.10 1.60 - 2.40 mg/dL   CBC   Result Value Ref Range    WBC 9.6 4.4 - 11.3 x10*3/uL    nRBC 0.0 0.0 - 0.0 /100 WBCs    RBC 2.69 (L) 4.00 - 5.20 x10*6/uL    Hemoglobin 7.8 (L) 12.0 - 16.0 g/dL    Hematocrit 22.0 (L) 36.0 - 46.0 %    MCV 82 80 - 100 fL    MCH 29.0 26.0 - 34.0 pg    MCHC 35.5 32.0 - 36.0 g/dL    RDW 17.5 (H) 11.5 - 14.5 %    Platelets 135 (L) 150 - 450 x10*3/uL   Renal Function Panel   Result Value Ref Range    Glucose 171 (H) 74 - 99 mg/dL    Sodium 142 136 - 145 mmol/L    Potassium 3.9 3.5 - 5.3 mmol/L    Chloride 108 (H) 98 - 107 mmol/L    Bicarbonate 27 21 - 32 mmol/L    Anion Gap 11 10 - 20 mmol/L    Urea Nitrogen 17 6 - 23 mg/dL    Creatinine 0.78 0.50 - 1.05 mg/dL    eGFR 81 >60 mL/min/1.73m*2    Calcium 8.8 8.6 - 10.3 mg/dL     Phosphorus 2.1 (L) 2.5 - 4.9 mg/dL    Albumin 3.1 (L) 3.4 - 5.0 g/dL   POCT GLUCOSE   Result Value Ref Range    POCT Glucose 155 (H) 74 - 99 mg/dL     XR chest 1 view    Result Date: 7/4/2024  Interpreted By:  Rl Morfin, STUDY: Chest dated  7/4/2024.   INDICATION: Signs/Symptoms:sob   COMPARISON: Chest dated 07/03/2024.   ACCESSION NUMBER(S): [id number]   ORDERING CLINICIAN: CAROL VILLANUEVA   TECHNIQUE: One view of the chest.   FINDINGS: There are bilateral chest tubes mediastinal drain and right IJ sheath with the tip projecting over the mid to lower SVC. Surgical changes are seen of the mediastinum. There is a subtle linear line near the left lung apex. There is left basilar opacity with blunting of the costophrenic angle. There is mild prominence of the pulmonary interstitium. Cardiomediastinal silhouette is prominent but similar to the prior exam.       1. Linear line at the left lung apex may represent a subtle left apical pneumothorax. Short-term follow-up with dedicated upright chest radiograph series is recommended. 2. Left basilar opacity likely small effusion with atelectasis. 3. Mild prominence of the pulmonary interstitium which may relate to mild pulmonary edema.   MACRO: Rl Morfin discussed the significance and urgency of this critical finding epic chat with  CAROL VILLANUEVA on 7/4/2024 at 10:19 am.  (**-RCF-**) Findings:  See findings.   Signed by: Rl Morfin 7/4/2024 10:19 AM Dictation workstation:   [id number]    ECG 12 Lead    Result Date: 7/3/2024  Normal sinus rhythm Normal ECG Confirmed by Kevin Love ([id number]) on 7/3/2024 9:18:20 AM    XR chest 1 view    Result Date: 7/3/2024  Interpreted By:  Meredith Pulido, STUDY: XR CHEST 1 VIEW;  7/3/2024 5:07 am   INDICATION: Signs/Symptoms:Post op cardiac surgery.   COMPARISON: 07/02/2024   ACCESSION NUMBER(S): [id number]   ORDERING CLINICIAN: ANGELIC SINGH   FINDINGS: Interval removal of ET tube and NG tube. Right jugular  central line, bilateral chest tubes and mediastinal drain remain in place. Additional presumed overlying monitoring/support devices again seen.   Minimal left-greater-than-right basilar opacities again seen. No pleural effusion or pneumothorax. Cardiac silhouette within normal limits for size status post sternotomy.       Interval removal of ET tube and NG tube. Additional support devices remain in place. Subtle bibasilar opacities.   MACRO: None.   Signed by: Meredith Pulido 7/3/2024 8:29 AM Dictation workstation:   [id number]    CT head wo IV contrast    Result Date: 7/3/2024  Interpreted By:  George Galdamez, STUDY: CT HEAD WO IV CONTRAST;  7/3/2024 1:46 am   INDICATION: Signs/Symptoms:AMS.   COMPARISON: CT head 09/12/2023   ACCESSION NUMBER(S): [id number]   ORDERING CLINICIAN: RADU MCCORMICK   TECHNIQUE: Noncontrast axial CT images of head were obtained with coronal and sagittal reconstructed images.   FINDINGS: BRAIN PARENCHYMA: Chronic lacunar infarct along the right internal capsule extending into the corona radiata. No acute large territory infarction or transcortical edema evident by CT. No mass-effect, midline shift or effacement of cerebral sulci. Patchy periventricular and subcortical white matter hypodensities, nonspecific but often seen in the setting of chronic microangiopathic change.   HEMORRHAGE: No acute intracranial hemorrhage.   VENTRICLES and EXTRA-AXIAL SPACES: The ventricles and sulci are within normal limits for brain volume. No abnormal extra-axial fluid collection.   ORBITS: The visualized orbits and globes are within normal limits.   EXTRACRANIAL SOFT TISSUES: Within normal limits.   PARANASAL SINUSES/MASTOIDS: The visualized paranasal sinuses and mastoid air cells are well aerated.   CALVARIUM: No depressed skull fracture.         1. No acute intracranial abnormality identified. 2. Chronic white matter changes likely reflecting sequela of small-vessel ischemic disease. 3. Chronic lacunar  infarct in the right internal capsule/corona radiata     If there is concern for superimposed acute ischemia or other underlying abnormality then MRI may be performed in further assessment.   MACRO: None   Signed by: George Galdamez 7/3/2024 2:00 AM Dictation workstation:   [id number]    XR chest 1 view    Result Date: 7/2/2024  Interpreted By:  Mimi Garrido, STUDY: XR CHEST 1 VIEW;  7/2/2024 3:02 pm   INDICATION: Signs/Symptoms:Post op cardiac surgery.   COMPARISON: 06/24/2024   ACCESSION NUMBER(S): [id number]   ORDERING CLINICIAN: ANGELIC SINGH   FINDINGS: The heart is normal size.   There are bilateral chest tubes. There is right-sided atelectasis.   An endotracheal tube terminates above the salome.   A nasogastric tube terminates below the diaphragm.   A venous catheter is present on the right. The tip is in the region of superior vena cava.   Sternal wires and mediastinal clips are present. There are surgical clips in the left upper abdomen.   COMPARISON OF FINDING: Interval surgery.       Interval cardiothoracic surgery. Right-sided atelectasis.   MACRO: none   Signed by: Mimi Garrido 7/2/2024 3:14 PM Dictation workstation:   [id number]            Assessment/Plan   Principal Problem:    Coronary artery disease involving native coronary artery of native heart  Active Problems:    Coronary artery disease involving native coronary artery of native heart with unstable angina pectoris (Multi)    Ms Rubi 68 y/o female with past medical history significant for CAD, HTN, HLD, TYLER, GERD, CVA on Plavix,  NIDDM II, Anemia. Work up for chest pain revealed  Multivessel disease including in stent re-stenosis of the circumflex and proximal LAD. She is admitted to ICU s/p Coronary Artery Bypass Graft x2 ; LIMA --> LAD, EVH RA --> RAMUS --> CX  EVH Right Radial Artery; EVH Right Saphenous Vein; CABG x 3 LIMA to LAD 26 ml/min, PI 3.6  Radial To Ramus, sequenced to Circ 26 ml/min,PI 1.6; Posterior Pericardial Window with  Dr Kee on 7/2.     NEURO:  H/O recent Right MCA CVA with residual Lt sided deficit (DAPT)  Acute metabolic encephalopathy - suspect postoperative i/s/o recent large Right CVA  Expected acute postop pain  CT head negative for acute/subacute infarcts, redemonstration of prior chronic Rt MCA infarct  - Serial neuro and pain assessments   - Analgesia: Lidoderm patches and scheduled Tylenol  - Hold analgesia   - PT/OT/MITT  - Delirium precautions, sleep hygiene              -> Sitting up in bed HOB > 40              -> Lights, TV/music/conversation on; blinds open              -> Avoid narcotics if possible              -> Sleep/wake cycle  - No MRI at this point per Dr. Kee     CV:  H/O HTN, HLD  MVD with in-stent restenosis s/p CABGx2, PPW  LVEF 65% pre/post  AV wires backup DDD 60- cap today  - Cont EKG, NIBP  - Titrate Cleviprex to maintain MAP goal MAP 65-90  - CXR 7/4 with small L apical PTX- remove R and mediastinal CT  - ASA/statin  - IV BB q6 with hold parameters while NPO  - Hold home Plavix (CVA) for now  - Hold home Enalapril, Amlodipine, Imdur. Resume as Hds permit.     PULM:  TYLER  Postop CXR negative for acute process  CXR 7/4 with small L apical PTX  - Wean FiO2 maintaining SpO2 >92%  - Acapella, IS     :  Preserved renal function (baseline Cr ~0.9)  - RFP daily, replace lytes prn  - Lasix 40 mg IVP after pRBC  - Remove Lawrence today if appropriate. Strict I&Os  - Maintain K > 4, Mg > 2     GI:  GERD  - NPO; failed BSE given mental status- NG placement today with TF  - Bowel regimen- senokot and miralax  - Zofran PRN     HEME:  Acute on chronic post op blood loss anemia (baseline Hgb ~9.0) and thrombocytopenia - stable  Postop RUE hematoma surrounding radial harvest site  7/2 POD0 bedside exploration of RUE hematoma involving harvest site  - 1u pRBC per Dr. Kee  - CBC daily  - DVTppx: SCDs, Lovenox  - Neurovascular assessment of radial graft harvest q1  - Radial graft site care  -> Maintain loose gauze  wrap over RUE exploration site              -> Notify ICU/CTS RAMSES if new or worsening firmness, bleeding, or loss of pulse ox or pulses              -> Elevation above heart if possible  - Wound care consulted     ENDO:  NIDDM2  A1c 6.1  - Maintain BG <180  - POCT BG, ISS q4  - Hold home Metformin and Jardiance for now given NPO     ID:  Afebrile, no current indications of infection  MRSA negative  Periop abx  - Trend temp, WBCs     Discussed with Dr. Kee via telephone.     Dispo: CTICU, transfer to SDU if appropriate this afternoon     I spent 60 minutes in the professional and overall care of this patient.      Viviana Leiva, APRN-CNP

## 2024-07-04 NOTE — PROGRESS NOTES
Physical Therapy                 Therapy Communication Note    Patient Name: Rita Rubi  MRN: [id number]  Today's Date: 7/4/2024     Discipline: Physical Therapy    Missed Visit Reason: Patient placed on medical hold (therapy consult received, performed chart review. Per conversation with RN, pt with metabolic encephalopathy and continues to not be appropriate for PT/OT evaluations, requesting continued hold this date)    Missed Time: Attempt

## 2024-07-05 ENCOUNTER — APPOINTMENT (OUTPATIENT)
Dept: RADIOLOGY | Facility: HOSPITAL | Age: 69
DRG: 235 | End: 2024-07-05
Payer: MEDICARE

## 2024-07-05 LAB
ALBUMIN SERPL BCP-MCNC: 2.9 G/DL (ref 3.4–5)
ANION GAP SERPL CALC-SCNC: 14 MMOL/L (ref 10–20)
BUN SERPL-MCNC: 19 MG/DL (ref 6–23)
CALCIUM SERPL-MCNC: 9.2 MG/DL (ref 8.6–10.3)
CHLORIDE SERPL-SCNC: 104 MMOL/L (ref 98–107)
CO2 SERPL-SCNC: 23 MMOL/L (ref 21–32)
CREAT SERPL-MCNC: 0.75 MG/DL (ref 0.5–1.05)
EGFRCR SERPLBLD CKD-EPI 2021: 85 ML/MIN/1.73M*2
ERYTHROCYTE [DISTWIDTH] IN BLOOD BY AUTOMATED COUNT: 16.4 % (ref 11.5–14.5)
GLUCOSE BLD MANUAL STRIP-MCNC: 138 MG/DL (ref 74–99)
GLUCOSE BLD MANUAL STRIP-MCNC: 159 MG/DL (ref 74–99)
GLUCOSE BLD MANUAL STRIP-MCNC: 176 MG/DL (ref 74–99)
GLUCOSE BLD MANUAL STRIP-MCNC: 179 MG/DL (ref 74–99)
GLUCOSE BLD MANUAL STRIP-MCNC: 189 MG/DL (ref 74–99)
GLUCOSE BLD MANUAL STRIP-MCNC: 201 MG/DL (ref 74–99)
GLUCOSE SERPL-MCNC: 196 MG/DL (ref 74–99)
HCT VFR BLD AUTO: 23.7 % (ref 36–46)
HGB BLD-MCNC: 8.2 G/DL (ref 12–16)
MAGNESIUM SERPL-MCNC: 1.8 MG/DL (ref 1.6–2.4)
MCH RBC QN AUTO: 28.5 PG (ref 26–34)
MCHC RBC AUTO-ENTMCNC: 34.6 G/DL (ref 32–36)
MCV RBC AUTO: 82 FL (ref 80–100)
NRBC BLD-RTO: 0 /100 WBCS (ref 0–0)
PHOSPHATE SERPL-MCNC: 1.9 MG/DL (ref 2.5–4.9)
PLATELET # BLD AUTO: 136 X10*3/UL (ref 150–450)
POTASSIUM SERPL-SCNC: 3.7 MMOL/L (ref 3.5–5.3)
RBC # BLD AUTO: 2.88 X10*6/UL (ref 4–5.2)
SODIUM SERPL-SCNC: 137 MMOL/L (ref 136–145)
WBC # BLD AUTO: 9.7 X10*3/UL (ref 4.4–11.3)

## 2024-07-05 PROCEDURE — C9248 INJ, CLEVIDIPINE BUTYRATE: HCPCS

## 2024-07-05 PROCEDURE — 99291 CRITICAL CARE FIRST HOUR: CPT

## 2024-07-05 PROCEDURE — 80069 RENAL FUNCTION PANEL: CPT

## 2024-07-05 PROCEDURE — 2500000004 HC RX 250 GENERAL PHARMACY W/ HCPCS (ALT 636 FOR OP/ED)

## 2024-07-05 PROCEDURE — 2500000001 HC RX 250 WO HCPCS SELF ADMINISTERED DRUGS (ALT 637 FOR MEDICARE OP): Performed by: NURSE PRACTITIONER

## 2024-07-05 PROCEDURE — 71045 X-RAY EXAM CHEST 1 VIEW: CPT | Performed by: RADIOLOGY

## 2024-07-05 PROCEDURE — 2500000005 HC RX 250 GENERAL PHARMACY W/O HCPCS

## 2024-07-05 PROCEDURE — 2500000001 HC RX 250 WO HCPCS SELF ADMINISTERED DRUGS (ALT 637 FOR MEDICARE OP)

## 2024-07-05 PROCEDURE — 83735 ASSAY OF MAGNESIUM: CPT

## 2024-07-05 PROCEDURE — 2500000002 HC RX 250 W HCPCS SELF ADMINISTERED DRUGS (ALT 637 FOR MEDICARE OP, ALT 636 FOR OP/ED): Performed by: NURSE PRACTITIONER

## 2024-07-05 PROCEDURE — 2020000001 HC ICU ROOM DAILY

## 2024-07-05 PROCEDURE — 85027 COMPLETE CBC AUTOMATED: CPT

## 2024-07-05 PROCEDURE — 37799 UNLISTED PX VASCULAR SURGERY: CPT

## 2024-07-05 PROCEDURE — 71045 X-RAY EXAM CHEST 1 VIEW: CPT

## 2024-07-05 PROCEDURE — 2500000004 HC RX 250 GENERAL PHARMACY W/ HCPCS (ALT 636 FOR OP/ED): Performed by: NURSE PRACTITIONER

## 2024-07-05 PROCEDURE — 82947 ASSAY GLUCOSE BLOOD QUANT: CPT

## 2024-07-05 RX ORDER — FUROSEMIDE 10 MG/ML
40 INJECTION INTRAMUSCULAR; INTRAVENOUS ONCE
Status: COMPLETED | OUTPATIENT
Start: 2024-07-05 | End: 2024-07-05

## 2024-07-05 RX ORDER — AMLODIPINE BESYLATE 5 MG/1
5 TABLET ORAL DAILY
Status: DISCONTINUED | OUTPATIENT
Start: 2024-07-05 | End: 2024-07-10 | Stop reason: HOSPADM

## 2024-07-05 RX ORDER — POTASSIUM CHLORIDE 14.9 MG/ML
20 INJECTION INTRAVENOUS
Status: COMPLETED | OUTPATIENT
Start: 2024-07-05 | End: 2024-07-05

## 2024-07-05 RX ORDER — MAGNESIUM SULFATE HEPTAHYDRATE 40 MG/ML
2 INJECTION, SOLUTION INTRAVENOUS ONCE
Status: COMPLETED | OUTPATIENT
Start: 2024-07-05 | End: 2024-07-05

## 2024-07-05 ASSESSMENT — PAIN - FUNCTIONAL ASSESSMENT
PAIN_FUNCTIONAL_ASSESSMENT: CPOT (CRITICAL CARE PAIN OBSERVATION TOOL)

## 2024-07-05 NOTE — CONSULTS
Wound Care Consult     Visit Date: 7/5/2024      Patient Name: Rita Rubi         MRN: [id number]           YOB: 1955     Reason for Consult: Assess RUE wound (former hematoma). Wound assessed and care recommended. NP Lele Iraheta provided orders.       Pertinent Labs:   Albumin   Date Value Ref Range Status   07/05/2024 2.9 (L) 3.4 - 5.0 g/dL Final     ALBUMIN (MG/L) IN URINE   Date Value Ref Range Status   11/23/2022 17.1 Not Established mg/L Final     Albumin, Urine Random   Date Value Ref Range Status   01/29/2024 12.7 Not established mg/L Final       Wound Assessment:  Wound 07/02/24 Incision Sternum (Active)   Site Assessment Dry;Intact 07/05/24 1215   Jolene-Wound Assessment Intact 07/05/24 0000   Closure Glue 07/05/24 0000   Sutures/Staple Line Approximated 07/04/24 1600   Drainage Description Red 07/05/24 1215   Drainage Amount Scant 07/05/24 1215   Dressing Absorptive;Dry dressing 07/05/24 1215   Dressing Status Dry;Old drainage 07/05/24 1215       Wound 07/02/24 Incision Arm Lower;Right (Active)   Site Assessment Unable to assess 07/05/24 1215   Jolene-Wound Assessment Swelling;Edematous;Painful 07/05/24 0000   Closure Sutures 07/05/24 0000   Sutures/Staple Line Non approximated 07/04/24 0400   Drainage Description Red;Sanguineous 07/04/24 1600   Drainage Amount Small 07/04/24 1600   Dressing Dry dressing 07/04/24 1600   Dressing Changed Changed 07/04/24 0800   Dressing Status Clean;Dry 07/05/24 0000       Wound 07/02/24 Incision Leg Right (Active)   Site Assessment Clean;Dry;Intact 07/05/24 0000   Jolene-Wound Assessment Intact 07/05/24 0000   Closure Glue 07/05/24 0000   Sutures/Staple Line Approximated 07/05/24 0000   Drainage Description None 07/04/24 0400   Drainage Amount None 07/04/24 0400   Dressing Gauze;Kerlix/rolled gauze 07/05/24 1215   Dressing Status Dry;Clean 07/05/24 1215     RUE hematoma evacuation site:   Wound Team Summary Assessment: Wound measures 2 x 1.5 x 1.5 cm. Visible  base is pink to red and moist. Moderate to large amount of bloody to serosanguineous drainage.     Wound and danielle-wound were cleaned with wound cleanser.   Wound lightly filled with strip of Drawtex. This was covered with additional layer of drawTex and secured with foam border dressing.  Dressings should be changed every other day or as needed if the Mepilex is 75% saturated.   Supplies were left at bedside.  Beside RN was instructed on care using the Drawtex.      Wound Team Plan: wound care should be managed by beside RN. Please contact WOC RN with questions or if would condition warrants change in care.      aDisy Eugene, BSN, RN, CWOCN   7/5/2024  3:47 PM

## 2024-07-05 NOTE — PROGRESS NOTES
Physical Therapy                 Therapy Communication Note    Patient Name: Rita Rubi  MRN: [id number]  Today's Date: 7/5/2024     Discipline: Physical Therapy    Missed Visit Reason: Missed Visit Reason: Patient placed on medical hold (Therapy consult received, performed chart review. Per ICU rounding this AM, pt with metabolic encephalopathy, continues to not be appropriate for PT/OT evaluations, will DC current order and await stabilization prior to PT being re-consulted)    Missed Time: Attempt

## 2024-07-05 NOTE — PROGRESS NOTES
Nutrition Follow-up Note  Nutrition Assessment       Laurence is a 69 y.o. female on day #3 of admission to ICU    Visited with pt:  -Pt only responds to questions/comments with numbers  -TF (pivot 1.5) seen running at 10mL/hr with water flushes 240mL Q8H  -HOB at approx 45 degrees    Spoke with CNP:  - RD given okay to adjust TF to pt needs  - pt has NG placed    TF to meet 75% of EEN at goal rate: RD discontinued Herberth supplements.    Scheduled medications  acetaminophen, 650 mg, oral, q6h  amLODIPine, 5 mg, oral, Daily  aspirin, 81 mg, oral, Daily  atorvastatin, 80 mg, oral, Nightly  enalapril, 20 mg, oral, BID  enoxaparin, 40 mg, subcutaneous, q24h  insulin lispro, 0-15 Units, subcutaneous, q4h  isosorbide dinitrate, 20 mg, oral, TID  metoprolol tartrate, 25 mg, oral, BID  polyethylene glycol, 17 g, oral, Daily  potassium chloride, 20 mEq, intravenous, q2h  potassium phosphate, 21 mmol, intravenous, Once  sennosides-docusate sodium, 2 tablet, oral, BID      Continuous medications  lactated Ringer's, 5 mL/hr, Last Rate: 5 mL/hr (07/05/24 1720)      PRN medications  PRN medications: alteplase, dextrose **OR** glucagon, [Held by provider] HYDROmorphone, naloxone, [Held by provider] oxyCODONE, [Held by provider] oxyCODONE, oxygen, traMADol     Latest Reference Range & Units 07/04/24 05:55 07/05/24 05:00   GLUCOSE 74 - 99 mg/dL 171 (H) 196 (H)   SODIUM 136 - 145 mmol/L 142 137   POTASSIUM 3.5 - 5.3 mmol/L 3.9 3.7   CHLORIDE 98 - 107 mmol/L 108 (H) 104   Bicarbonate 21 - 32 mmol/L 27 23   Anion Gap 10 - 20 mmol/L 11 14   Blood Urea Nitrogen 6 - 23 mg/dL 17 19   Creatinine 0.50 - 1.05 mg/dL 0.78 0.75   EGFR >60 mL/min/1.73m*2 81 85   Calcium 8.6 - 10.3 mg/dL 8.8 9.2   PHOSPHORUS 2.5 - 4.9 mg/dL 2.1 (L) 1.9 (L)   Albumin 3.4 - 5.0 g/dL 3.1 (L) 2.9 (L)   MAGNESIUM 1.60 - 2.40 mg/dL 2.10 1.80   (H): Data is abnormally high  (L): Data is abnormally low    Dietary Orders (From admission, onward)       Start     Ordered     "07/05/24 1705  Enteral feeding with NPO NG (nasogastric tube); 10 (Increase by 10mL every 6 hours until goal rate of 40mL/hr is reached); 240; Water; Tap water; Other (specify); Every 8 hours  Diet effective now        Question Answer Comment   Tube feeding formula: Pivot 1.5    Feeding route: NG (nasogastric tube)    Tube feeding continuous rate (mL/hr): 10 Increase by 10mL every 6 hours until goal rate of 40mL/hr is reached   Tube feeding flush (mL): 240    Flush type: Water    Water type: Tap water    Flush frequency: Other (specify)    Additional Details Every 8 hours        07/05/24 1705                  History:  Food and Nutrient History  Energy Intake: Poor < 50 %  Food and Nutrient History: EEN <50% for 3 days d/t pt NPO/TF running trophic feeds.    Anthropometrics:  Height: 168.9 cm (5' 6.5\")  Weight: 103 kg (227 lb 1.2 oz)  BMI (Calculated): 36.11    Weight Change: 0.92    Wt Readings from Last 15 Encounters:   07/05/24 103 kg (227 lb 1.2 oz)   06/24/24 93 kg (205 lb 0.4 oz)   05/08/24 94.3 kg (208 lb)   05/01/24 91.2 kg (201 lb)   04/05/24 90 kg (198 lb 6.6 oz)   03/25/24 90.7 kg (200 lb)   03/01/24 91.2 kg (201 lb)   02/01/24 89.4 kg (197 lb)   01/25/24 89.6 kg (197 lb 9.6 oz)   12/12/23 89.4 kg (197 lb)   12/08/23 89.4 kg (197 lb)   10/31/23 90.7 kg (200 lb)   10/10/23 91.2 kg (201 lb)   08/15/23 92.7 kg (204 lb 7 oz)   07/07/23 95.8 kg (211 lb 4 oz)     Weight         7/3/2024  0537 7/3/2024  1833 7/4/2024  0600 7/5/2024  0600 7/5/2024  1712    Weight: 103 kg (227 lb 8.2 oz) 103 kg (227 lb 8.2 oz) 102 kg (224 lb 6.9 oz) 102 kg (225 lb) 103 kg (227 lb 1.2 oz)          Weight Change  Weight History / % Weight Change: 9/14/23: 91.6 kg, prior to CVA.  Significant Weight Loss: No  Significant Weight Gain: Fluid related       IBW/kg (Dietitian Calculated): 61.4 kg  Percent of IBW: 168 %     LBM 7/5/24    Energy Needs:  Calculated Energy Needs Using Equations  Height: 168.9 cm (5' 6.5\")  Minute Ventilation " (L/min): 8 L/min  Temp: 36.2 °C (97.1 °F)    Estimated Energy Needs  Method for Estimating Needs: 1351-2303 @ 19-21 kcal/kg    Estimated Protein Needs  Method for Estimating Needs: 74-86 @ 1.2-1.4 gr/kg IBW    Estimated Fluid Needs  Total Fluid Estimated Needs (mL): 1535 mL  Total Fluid Estimated Needs (mL/kg): 25 mL/kg       Nutrition Focused Physical Findings:  Subcutaneous Fat Loss  Orbital Fat Pads: Well nourished (slightly bulging fat pads)  Buccal Fat Pads: Well nourished (full, rounded cheeks)    Muscle Wasting  Temporalis: Well nourished (well-defined muscle)  Pectoralis (Clavicular Region): Well nourished (clavicle not visible)  Deltoid/Trapezius: Well nourished (rounded appearance at arm, shoulder, neck)    Edema  Edema Location: Non-pitting edema (generalized and RUE/LUE/LLE/RLE)       Physical Findings (Nutrition Deficiency/Toxicity)  Skin: Positive (Surgical incisions- rt arm/leg and sternum)       Nutrition Diagnosis   Malnutrition Diagnosis  Patient has Malnutrition Diagnosis: No    Patient has Nutrition Diagnosis: Yes  Nutrition Diagnosis 1: Inadequate oral intake  Diagnosis Status (1): Ongoing  Related to (1): s/p CABG, encephalopathic  As Evidenced by (1): NPO and need for TF       Nutrition Interventions/Recommendations   Nutrition Prescription  Individualized Nutrition Prescription Provided for : TF to provide 1440, 89g of protein and 720mL of water at goal rate. Additional 720mL of water from flushes.    Food and/or Nutrient Delivery Interventions  Interventions: Enteral intake, Parenteral nutrition/ IV fluids       Enteral Intake: Enteral nutrition site care, Feeding tube flush, Modify rate of enteral nutrition    Parenteral Nutrition/IV Fluids: IV fluid delivery  Goal: Consider adjusting or D/C IV fluids as TF volume increases.    -HOB at 45 degrees to lower risk of aspiration  -Consider bowel regimen  -Continue TF at 10mL, increasing by 10mL Q6H until goal rate 40mL/hr reached.  -Daily wts,  strict I/Os  -RFP panel and Mg daily; replete prn  -POCT glucose Q6H  -Should pt experience S/S of intolerance from TF, hold TF for 2 hours and restart at 20mL/hr.  -Pt has T2DM; should BG levels remain high, consider switching TF to Glucerna 1.5   -Water flushes per MD. Should Na levels continue to decrease, consider lowering water flushes.    Coordination of Nutrition Care by a Nutrition Professional  Collaboration and Referral of Nutrition Care: Collaboration by nutrition professional with other providers      Nutrition Monitoring and Evaluation   Food and Nutrient Related History  Energy Intake: Estimated energy intake  Criteria: TF to provide 75% of EEN at goal rate    Fluid Intake: Estimated fluid intake  Criteria: Water flushes per MD       Enteral and Parenteral Nutrition Intake: Enteral nutrition formula/solution, Enteral nutrition intake  Criteria: Monitor    Anthropometrics: Body Composition/Growth/Weight History  Weight: Estimated dry weight, Weight change    Weight Change: Weight change percentage, Weight gain, Weight loss    Body Mass: Body mass index (BMI)       Biochemical Data, Medical Tests and Procedures  Electrolyte and Renal Panel: Chloride, Sodium, Phosphorus, Potassium, Magnesium  Criteria: As clinically indicated       Glucose/Endocrine Profile: Glucose, casual, Hemoglobin A1c (HgbA1c)  Criteria: As clinically indicated    Nutrition Focused Physical Findings     Digestive System: Nausea, Vomiting, Diarrhea, Constipation    Other: overall appearance and I/Os       Follow Up  Time Spent (min): 70 minutes  Last Date of Nutrition Visit: 07/05/24  Nutrition Follow-Up Needed?: Dietitian to reassess per policy  Follow up Comment: [place]

## 2024-07-05 NOTE — PROGRESS NOTES
"Rita Rubi is a 69 y.o. female on day 3 of admission presenting with Coronary artery disease involving native coronary artery of native heart.    Subjective   Patient answers to name and when asked how she is doing, she says \"good\".  She continues to verbalize numbers repeatedly and does not follow commands.       Objective     Physical Exam  Eyes:      Pupils: Pupils are equal, round, and reactive to light.   Cardiovascular:      Rate and Rhythm: Regular rhythm. Tachycardia present.      Heart sounds: Normal heart sounds.   Pulmonary:      Breath sounds: Normal breath sounds.   Chest:      Comments: Midsternal incision- DHAVAL, no drainage or crepitus  CT site with dressing- CDI  L CT with sanguinous drainage  Pacer wires capped  Abdominal:      General: Bowel sounds are normal.      Palpations: Abdomen is soft.   Genitourinary:     Comments: Lawrence draining diamond urine  Musculoskeletal:      Comments: RUE edema  +1 B/L LE edema   Skin:     General: Skin is warm and dry.      Capillary Refill: Capillary refill takes less than 2 seconds.      Comments: R upper forearm I & D   Neurological:      Mental Status: She is alert.      Comments: Tracks in room and speaks at times, but does not follow commands         Last Recorded Vitals  Blood pressure 125/50, pulse 105, temperature 37.5 °C (99.5 °F), temperature source Temporal, resp. rate 14, height 1.689 m (5' 6.5\"), weight 102 kg (225 lb), SpO2 97%.  Intake/Output last 3 Shifts:  I/O last 3 completed shifts:  In: 498.7 (4.9 mL/kg) [I.V.:498.7 (4.9 mL/kg)]  Out: 2145 (21 mL/kg) [Urine:2115 (0.6 mL/kg/hr); Chest Tube:30]  Weight: 102.1 kg     Relevant Results  Scheduled medications  acetaminophen, 650 mg, oral, q6h  amLODIPine, 5 mg, oral, Daily  aspirin, 81 mg, oral, Daily  atorvastatin, 80 mg, oral, Nightly  enalapril, 20 mg, oral, BID  enoxaparin, 40 mg, subcutaneous, q24h  furosemide, 40 mg, intravenous, Once  insulin lispro, 0-15 Units, subcutaneous, q4h  isosorbide " dinitrate, 20 mg, oral, TID  magnesium sulfate, 2 g, intravenous, Once  metoprolol tartrate, 25 mg, oral, BID  polyethylene glycol, 17 g, oral, Daily  potassium chloride, 20 mEq, intravenous, q2h  potassium phosphate, 21 mmol, intravenous, Once  sennosides-docusate sodium, 2 tablet, oral, BID      Continuous medications  clevidipine, 0-16 mg/hr, Last Rate: Stopped (07/05/24 1015)  lactated Ringer's, 5 mL/hr, Last Rate: 5 mL/hr (07/05/24 0710)      PRN medications  PRN medications: alteplase, dextrose **OR** glucagon, [Held by provider] HYDROmorphone, naloxone, [Held by provider] oxyCODONE, [Held by provider] oxyCODONE, oxygen, traMADol  Results for orders placed or performed during the hospital encounter of 07/02/24 (from the past 24 hour(s))   POCT GLUCOSE   Result Value Ref Range    POCT Glucose 206 (H) 74 - 99 mg/dL   POCT GLUCOSE   Result Value Ref Range    POCT Glucose 150 (H) 74 - 99 mg/dL   POCT GLUCOSE   Result Value Ref Range    POCT Glucose 159 (H) 74 - 99 mg/dL   CBC   Result Value Ref Range    WBC 9.7 4.4 - 11.3 x10*3/uL    nRBC 0.0 0.0 - 0.0 /100 WBCs    RBC 2.88 (L) 4.00 - 5.20 x10*6/uL    Hemoglobin 8.2 (L) 12.0 - 16.0 g/dL    Hematocrit 23.7 (L) 36.0 - 46.0 %    MCV 82 80 - 100 fL    MCH 28.5 26.0 - 34.0 pg    MCHC 34.6 32.0 - 36.0 g/dL    RDW 16.4 (H) 11.5 - 14.5 %    Platelets 136 (L) 150 - 450 x10*3/uL   Magnesium   Result Value Ref Range    Magnesium 1.80 1.60 - 2.40 mg/dL   Renal Function Panel   Result Value Ref Range    Glucose 196 (H) 74 - 99 mg/dL    Sodium 137 136 - 145 mmol/L    Potassium 3.7 3.5 - 5.3 mmol/L    Chloride 104 98 - 107 mmol/L    Bicarbonate 23 21 - 32 mmol/L    Anion Gap 14 10 - 20 mmol/L    Urea Nitrogen 19 6 - 23 mg/dL    Creatinine 0.75 0.50 - 1.05 mg/dL    eGFR 85 >60 mL/min/1.73m*2    Calcium 9.2 8.6 - 10.3 mg/dL    Phosphorus 1.9 (L) 2.5 - 4.9 mg/dL    Albumin 2.9 (L) 3.4 - 5.0 g/dL   POCT GLUCOSE   Result Value Ref Range    POCT Glucose 189 (H) 74 - 99 mg/dL   POCT  GLUCOSE   Result Value Ref Range    POCT Glucose 201 (H) 74 - 99 mg/dL     XR chest 1 view    Result Date: 7/5/2024  Interpreted By:  Bruno Gomez, STUDY: XR CHEST 1 VIEW;  7/5/2024 5:11 am   INDICATION: Signs/Symptoms:Re-evaluation of pneumothorax.   COMPARISON: Most recent prior chest x-ray is from 07/04/2024.   ACCESSION NUMBER(S): [id number]   ORDERING CLINICIAN: FLOYD CUNNINGHAM   TECHNIQUE: Single AP portable view of the chest was obtained.   FINDINGS: MEDIASTINUM/ LUNGS/ LEXA: Recent heart surgery. Mild cardiomegaly. Central vasculature remains mildly prominent. No peripheral interstitial thickening. There is persistent pleural and parenchymal opacity at the left base, unchanged. Right-sided chest tube has been removed. Stable left-sided chest tube. Interval placement of a nasogastric tube with distal tip in the gastric remnant. Distal tip of the right IJ vein catheter is in the distal superior vena cava. Tiny stable pneumothorax on the left in the left lung apex. No pneumothorax evident on the right. No tracheal deviation. No abnormal hilar fullness or gross mass on either side.   BONES: No lytic or blastic destructive bone lesion.   UPPER ABDOMEN: Surgical changes related to previous bariatric surgery..       Right-sided chest tube has been removed. No right-sided pneumothorax.   Interval placement of a NG tube with distal tip in the gastric remnant.   Stable mediastinal drainage catheter, left chest tube, and right IJ vein catheter. Stable tiny left apical pneumothorax.   Persistent left pleural effusion with associated left basilar atelectasis/infiltrate.   Cardiomegaly with mild stable central vascular congestion.   MACRO: None   Signed by: Bruno Gomez 7/5/2024 8:07 AM Dictation workstation:   [id number]    XR abdomen 1 view    Result Date: 7/5/2024  Interpreted By:  Bruno Gomez, STUDY: XR ABDOMEN 1 VIEW;  7/4/2024 1:19 pm   INDICATION: Signs/Symptoms:NG placement.   COMPARISON: Chest x-ray from  07/04/2024   ACCESSION NUMBER(S): [id number]   ORDERING CLINICIAN: FLOYD CUNNINGHAM   TECHNIQUE:     Supine view of the lower chest and upper abdomen was obtained.   FINDINGS: Status post recent CABG. Distal tip of the right IJ vein catheter is in the distal superior vena cava. Stable mediastinal drainage catheter. Stable left-sided chest tube. Right-sided chest tube has been removed. There is persistent pleural and parenchymal opacity at the left base with obscuration of the left diaphragm. There is an NG tube now in place with distal tip in the mid stomach. Multiple stable medial left upper quadrant abdominal surgical clips. EKG wires overlie the chest. Stable arthritic changes in the spine.       NG tube has been placed with distal tip in the mid stomach.   Previous bariatric surgery.   Stable left pleural effusion with left basilar atelectasis/infiltrate.   Right chest tube has been removed. Additional tubes and lines in place as described.   MACRO: None   Signed by: Bruno Gomez 7/5/2024 8:05 AM Dictation workstation:   [id number]    XR abdomen 1 view    Result Date: 7/4/2024  Interpreted By:  Angi Odonnell, STUDY: XR ABDOMEN 1 VIEW;  7/4/2024 2:22 pm   INDICATION: Signs/Symptoms:NG repositioning.   COMPARISON: None.   ACCESSION NUMBER(S): [id number]   ORDERING CLINICIAN: CAROL VILLANUEVA   FINDINGS: Left-sided chest tube is noted. Sternotomy wires are seen. Nasogastric tube is noted with the distal tip projecting over the left midabdomen.   The heart appears to be enlarged. Increased density seen in the left lung base.       Nasogastric tube with the distal tip projecting over the left midabdomen.     MACRO: None   Signed by: Angi Odonnell 7/4/2024 2:37 PM Dictation workstation:   [id number]    XR chest 1 view    Result Date: 7/4/2024  Interpreted By:  Rl Morfin, STUDY: Chest dated  7/4/2024.   INDICATION: Signs/Symptoms:sob   COMPARISON: Chest dated 07/03/2024.   ACCESSION NUMBER(S): [id number]    ORDERING CLINICIAN: CAROL VILLANUEVA   TECHNIQUE: One view of the chest.   FINDINGS: There are bilateral chest tubes mediastinal drain and right IJ sheath with the tip projecting over the mid to lower SVC. Surgical changes are seen of the mediastinum. There is a subtle linear line near the left lung apex. There is left basilar opacity with blunting of the costophrenic angle. There is mild prominence of the pulmonary interstitium. Cardiomediastinal silhouette is prominent but similar to the prior exam.       1. Linear line at the left lung apex may represent a subtle left apical pneumothorax. Short-term follow-up with dedicated upright chest radiograph series is recommended. 2. Left basilar opacity likely small effusion with atelectasis. 3. Mild prominence of the pulmonary interstitium which may relate to mild pulmonary edema.   MACRO: Rl Morfin discussed the significance and urgency of this critical finding epic chat with  CAROL VILLANUEVA on 7/4/2024 at 10:19 am.  (**-RCF-**) Findings:  See findings.   Signed by: Rl Morfin 7/4/2024 10:19 AM Dictation workstation:   [id number]             Assessment/Plan   Principal Problem:    Coronary artery disease involving native coronary artery of native heart  Active Problems:    Coronary artery disease involving native coronary artery of native heart with unstable angina pectoris (Multi)    Ms Rubi 68 y/o female with past medical history significant for CAD, HTN, HLD, TYLER, GERD, CVA on Plavix,  NIDDM II, Anemia. Work up for chest pain revealed  Multivessel disease including in stent re-stenosis of the circumflex and proximal LAD. She is admitted to ICU s/p Coronary Artery Bypass Graft x2 ; LIMA --> LAD, EVH RA --> RAMUS --> CX  EVH Right Radial Artery; EVH Right Saphenous Vein; CABG x 3 LIMA to LAD 26 ml/min, PI 3.6  Radial To Ramus, sequenced to Circ 26 ml/min,PI 1.6; Posterior Pericardial Window with Dr Kee on 7/2.     NEURO:  H/O recent Right MCA CVA with residual  Lt sided deficit (DAPT)  Acute metabolic encephalopathy - suspect postoperative i/s/o recent large Right CVA  Expected acute postop pain  CT head negative for acute/subacute infarcts, redemonstration of prior chronic Rt MCA infarct  - Serial neuro and pain assessments   - Analgesia: Lidoderm patches, scheduled Tylenol and PRN Ultram  - Hold analgesia   - PT/OT/MITT  - Delirium precautions, sleep hygiene              -> Sitting up in bed HOB > 40              -> Lights, TV/music/conversation on; blinds open              -> Avoid narcotics if possible              -> Sleep/wake cycle  - No MRI at this point per Dr. Kee  - Neurology consulted     CV:  H/O HTN, HLD  MVD with in-stent restenosis s/p CABGx2, PPW  LVEF 65% pre/post  AV wires capped  - Cont EKG, NIBP  - Titrate Cleviprex   - ASA/statin/BB  - Restart home Amlodipine  - Continue home enalapril and imdur  - Hold home Plavix (CVA) for now     PULM:  TYLER  Postop CXR negative for acute process  CXR 7/4 with small L apical PTX  - Wean FiO2 maintaining SpO2 >92%  - Acapella, IS     :  Preserved renal function (baseline Cr ~0.9)  - RFP daily, replace lytes prn  - Lasix 40 mg IVP today  - Remove Lawrence today if appropriate. Strict I&Os  - Maintain K > 4, Mg > 2     GI:  GERD  - NPO; failed BSE given mental status  - NG with TF  - Bowel regimen- senokot and miralax  - Zofran PRN  - Consult dietician     HEME:  Acute on chronic post op blood loss anemia (baseline Hgb ~9.0) and thrombocytopenia - stable  Postop RUE hematoma surrounding radial harvest site  7/2 POD0 bedside exploration of RUE hematoma involving harvest site  - CBC daily  - DVTppx: SCDs, Lovenox  - Neurovascular assessment of radial graft harvest q1  - Radial graft site care  -> Maintain loose gauze wrap over RUE exploration site              -> Notify ICU/CTS RAMSES if new or worsening firmness, bleeding, or loss of pulse ox or pulses              -> Elevation above heart if possible  - Wound care  consulted     ENDO:  NIDDM2  A1c 6.1  - Maintain BG <180  - POCT BG, ISS q4  - Hold home Metformin and Jardiance for now     ID:  Afebrile, no current indications of infection  MRSA negative  Periop abx  - Trend temp, WBCs     Discussed with Dr. Kee via telephone.     Dispo: CTICU, transfer to SDU if appropriate this afternoon          I spent 60 minutes in the professional and overall care of this critically ill patient.      Viviana Leiva, APRN-CNP

## 2024-07-05 NOTE — PROGRESS NOTES
07/05/24 1243   Discharge Planning   Patient expects to be discharged to: Home with [facility] vs SNF?         Patient remains in ICU. She is still encephalopathic. Patient still has chest tube, NG for tube feeding and nutrition support. Waiting for PT/OT to eval patient and rec to see what her discharge needs will be. Patient is not med ready. Will continue to follow for discharge needs.

## 2024-07-06 ENCOUNTER — APPOINTMENT (OUTPATIENT)
Dept: RADIOLOGY | Facility: HOSPITAL | Age: 69
DRG: 235 | End: 2024-07-06
Payer: MEDICARE

## 2024-07-06 LAB
ALBUMIN SERPL BCP-MCNC: 2.7 G/DL (ref 3.4–5)
ANION GAP SERPL CALC-SCNC: 13 MMOL/L (ref 10–20)
BUN SERPL-MCNC: 20 MG/DL (ref 6–23)
CALCIUM SERPL-MCNC: 8.4 MG/DL (ref 8.6–10.3)
CHLORIDE SERPL-SCNC: 104 MMOL/L (ref 98–107)
CO2 SERPL-SCNC: 25 MMOL/L (ref 21–32)
CREAT SERPL-MCNC: 0.62 MG/DL (ref 0.5–1.05)
EGFRCR SERPLBLD CKD-EPI 2021: >90 ML/MIN/1.73M*2
ERYTHROCYTE [DISTWIDTH] IN BLOOD BY AUTOMATED COUNT: 16.2 % (ref 11.5–14.5)
GLUCOSE BLD MANUAL STRIP-MCNC: 176 MG/DL (ref 74–99)
GLUCOSE BLD MANUAL STRIP-MCNC: 197 MG/DL (ref 74–99)
GLUCOSE BLD MANUAL STRIP-MCNC: 198 MG/DL (ref 74–99)
GLUCOSE BLD MANUAL STRIP-MCNC: 208 MG/DL (ref 74–99)
GLUCOSE BLD MANUAL STRIP-MCNC: 218 MG/DL (ref 74–99)
GLUCOSE BLD MANUAL STRIP-MCNC: 237 MG/DL (ref 74–99)
GLUCOSE SERPL-MCNC: 179 MG/DL (ref 74–99)
HCT VFR BLD AUTO: 21.8 % (ref 36–46)
HGB BLD-MCNC: 7.5 G/DL (ref 12–16)
MAGNESIUM SERPL-MCNC: 2.1 MG/DL (ref 1.6–2.4)
MCH RBC QN AUTO: 28.7 PG (ref 26–34)
MCHC RBC AUTO-ENTMCNC: 34.4 G/DL (ref 32–36)
MCV RBC AUTO: 84 FL (ref 80–100)
NRBC BLD-RTO: 0 /100 WBCS (ref 0–0)
PHOSPHATE SERPL-MCNC: 2.5 MG/DL (ref 2.5–4.9)
PLATELET # BLD AUTO: 164 X10*3/UL (ref 150–450)
POTASSIUM SERPL-SCNC: 3.8 MMOL/L (ref 3.5–5.3)
RBC # BLD AUTO: 2.61 X10*6/UL (ref 4–5.2)
SODIUM SERPL-SCNC: 138 MMOL/L (ref 136–145)
WBC # BLD AUTO: 8.2 X10*3/UL (ref 4.4–11.3)

## 2024-07-06 PROCEDURE — 71046 X-RAY EXAM CHEST 2 VIEWS: CPT

## 2024-07-06 PROCEDURE — 2020000001 HC ICU ROOM DAILY

## 2024-07-06 PROCEDURE — 2500000004 HC RX 250 GENERAL PHARMACY W/ HCPCS (ALT 636 FOR OP/ED): Performed by: NURSE PRACTITIONER

## 2024-07-06 PROCEDURE — 37799 UNLISTED PX VASCULAR SURGERY: CPT

## 2024-07-06 PROCEDURE — 85027 COMPLETE CBC AUTOMATED: CPT

## 2024-07-06 PROCEDURE — 80069 RENAL FUNCTION PANEL: CPT

## 2024-07-06 PROCEDURE — 2500000001 HC RX 250 WO HCPCS SELF ADMINISTERED DRUGS (ALT 637 FOR MEDICARE OP): Performed by: NURSE PRACTITIONER

## 2024-07-06 PROCEDURE — 76937 US GUIDE VASCULAR ACCESS: CPT

## 2024-07-06 PROCEDURE — 94660 CPAP INITIATION&MGMT: CPT

## 2024-07-06 PROCEDURE — 9420000001 HC RT PATIENT EDUCATION 5 MIN

## 2024-07-06 PROCEDURE — 2500000002 HC RX 250 W HCPCS SELF ADMINISTERED DRUGS (ALT 637 FOR MEDICARE OP, ALT 636 FOR OP/ED): Performed by: NURSE PRACTITIONER

## 2024-07-06 PROCEDURE — 82947 ASSAY GLUCOSE BLOOD QUANT: CPT

## 2024-07-06 PROCEDURE — 2500000001 HC RX 250 WO HCPCS SELF ADMINISTERED DRUGS (ALT 637 FOR MEDICARE OP)

## 2024-07-06 PROCEDURE — 94668 MNPJ CHEST WALL SBSQ: CPT

## 2024-07-06 PROCEDURE — 99291 CRITICAL CARE FIRST HOUR: CPT | Performed by: NURSE PRACTITIONER

## 2024-07-06 PROCEDURE — 71046 X-RAY EXAM CHEST 2 VIEWS: CPT | Performed by: RADIOLOGY

## 2024-07-06 PROCEDURE — 83735 ASSAY OF MAGNESIUM: CPT

## 2024-07-06 PROCEDURE — 99291 CRITICAL CARE FIRST HOUR: CPT | Performed by: STUDENT IN AN ORGANIZED HEALTH CARE EDUCATION/TRAINING PROGRAM

## 2024-07-06 RX ORDER — FUROSEMIDE 10 MG/ML
40 INJECTION INTRAMUSCULAR; INTRAVENOUS
Status: DISCONTINUED | OUTPATIENT
Start: 2024-07-06 | End: 2024-07-10 | Stop reason: HOSPADM

## 2024-07-06 RX ORDER — POTASSIUM CHLORIDE 1.5 G/1.58G
20 POWDER, FOR SOLUTION ORAL
Status: DISCONTINUED | OUTPATIENT
Start: 2024-07-06 | End: 2024-07-10 | Stop reason: HOSPADM

## 2024-07-06 RX ORDER — POTASSIUM CHLORIDE 1.5 G/1.58G
40 POWDER, FOR SOLUTION ORAL ONCE
Status: COMPLETED | OUTPATIENT
Start: 2024-07-06 | End: 2024-07-06

## 2024-07-06 ASSESSMENT — PAIN SCALES - GENERAL
PAINLEVEL_OUTOF10: 0 - NO PAIN
PAINLEVEL_OUTOF10: 0 - NO PAIN

## 2024-07-06 ASSESSMENT — PAIN - FUNCTIONAL ASSESSMENT: PAIN_FUNCTIONAL_ASSESSMENT: 0-10

## 2024-07-06 NOTE — PROGRESS NOTES
"Rita Rubi is a 69 y.o. female on day 4 of admission presenting with Coronary artery disease involving native coronary artery of native heart    Subjective   No issues over night  Awake conversant A&O x2-3    Objective     Physical Exam  Vitals reviewed.   Constitutional:       General: She is not in acute distress.     Appearance: She is obese. She is ill-appearing.   HENT:      Mouth/Throat:      Mouth: Mucous membranes are dry.   Neck:      Comments: RIJ slick  Cardiovascular:      Rate and Rhythm: Normal rate and regular rhythm.      Pulses: Normal pulses.      Comments: MSI stable w/o click, well approximated w/o drainage or hematoma  EP wires capped  Left SVG site w/o drainage +bruising  Right AC Radial harvest with some ss drainage +bruising +hematoma   Pulmonary:      Effort: No respiratory distress.      Breath sounds: No stridor. No wheezing.      Comments: Shallow respirations, Equal chest rise, chest tube site dressing w/ss drainage  Strong non productive cough    Chest:      Chest wall: Tenderness present.   Abdominal:      Palpations: Abdomen is soft.      Comments: Ng w/tf at goal  +bm     Genitourinary:     Comments: Purwic   Musculoskeletal:      Right lower leg: Edema present.      Left lower leg: Edema present.   Skin:     General: Skin is warm and dry.      Findings: Bruising present.   Neurological:      Mental Status: She is alert. Mental status is at baseline.      Motor: Weakness present.      Comments: Right upper and lower weakness 3/5  Left upper and lower 2/5     Psychiatric:      Comments: Slow to respond   Alert to self and place         Review of Systems  Denies chest pain, palpations, light headedness, dizziness sob, n/v fever, chills    Last Recorded Vitals   Blood pressure 124/51, pulse 101, temperature 36.8 °C (98.2 °F), temperature source Tympanic, resp. rate 17, height 1.689 m (5' 6.5\"), weight 101 kg (222 lb 0.1 oz), SpO2 98%.    Intake/Output Last 3 Shifts  I/O last 3 " completed shifts:  In: 1619.6 (16.1 mL/kg) [I.V.:397.6 (3.9 mL/kg); NG/GT:765; IV Piggyback:457]  Out: 3315 (32.9 mL/kg) [Urine:2325 (0.6 mL/kg/hr); Emesis/NG output:960; Chest Tube:30]  Weight: 100.7 kg     Lines  CVC 07/02/24 Double lumen Right Internal jugular (Active)   Placement Date/Time: 07/02/24 (c) 0858   Hand Hygiene Performed Prior to CVC Insertion: Yes  Site Prep: Chlorhexidine   Site Prep Agent has Completely Dried Before Insertion: Yes  All 5 Sterile Barriers Used (Gloves, Gown, Cap, Mask, Large Sterile Azul...   Number of days: 4       NG/OG/Feeding Tube NG - [place] sump 16 Fr Right nostril (Active)   Placement Date/Time: 07/04/24 1200   Earliest Known Present: 07/04/24  Hand Hygiene Completed: Yes  Type of Tube: NG/OG Tube  Tube Length: 65 cm  Tube Type: NG - [place] sump  NG/OG Tube Size: 16 Fr  Tube Location: Right nostril   Number of days: 1       External Urinary Catheter Female (Active)   Placement Date/Time: 07/05/24 2000   Hand Hygiene Completed: Yes  External Catheter Type: Female  Removal Reason : Per protocol   Number of days: 0        Recent Labs  CMP:  Results from last 7 days   Lab Units 07/06/24  0556 07/05/24  0500 07/04/24  0555 07/03/24  0523 07/02/24  1510   SODIUM mmol/L 138 137 142 141 143   POTASSIUM mmol/L 3.8 3.7 3.9 3.8 4.1   CHLORIDE mmol/L 104 104 108* 108* 108*   CO2 mmol/L 25 23 27 27 27   ANION GAP mmol/L 13 14 11 10 12   BUN mg/dL 20 19 17 16 16   CREATININE mg/dL 0.62 0.75 0.78 0.80 0.99   EGFR mL/min/1.73m*2 >90 85 81 79 61   MAGNESIUM mg/dL 2.10 1.80 2.10 2.20 3.10*   ALBUMIN g/dL 2.7* 2.9* 3.1* 3.5 3.3*     CBC:  Results from last 7 days   Lab Units 07/06/24  0556 07/05/24  0500 07/04/24  0555 07/03/24  0523 07/02/24  1510   WBC AUTO x10*3/uL 8.2 9.7 9.6 7.7 8.5   HEMOGLOBIN g/dL 7.5* 8.2* 7.8* 9.0* 9.0*   HEMATOCRIT % 21.8* 23.7* 22.0* 24.8* 25.1*   PLATELETS AUTO x10*3/uL 164 136* 135* 110* 91*   MCV fL 84 82 82 81 79*     COAG:   Results from last 7 days   Lab Units  07/02/24  1510   INR  1.2*     HEME/ENDO:     CARDIAC:        Imaging  XR chest 1 view   Final Result   Right-sided chest tube has been removed. No right-sided pneumothorax.        Interval placement of a NG tube with distal tip in the gastric   remnant.        Stable mediastinal drainage catheter, left chest tube, and right IJ   vein catheter. Stable tiny left apical pneumothorax.        Persistent left pleural effusion with associated left basilar   atelectasis/infiltrate.        Cardiomegaly with mild stable central vascular congestion.        MACRO:   None        Signed by: Bruno Gomez 7/5/2024 8:07 AM   Dictation workstation:   [id number]      XR abdomen 1 view   Final Result   Nasogastric tube with the distal tip projecting over the left   midabdomen.             MACRO:   None        Signed by: Angi Odonnell 7/4/2024 2:37 PM   Dictation workstation:   [id number]      XR abdomen 1 view   Final Result   NG tube has been placed with distal tip in the mid stomach.        Previous bariatric surgery.        Stable left pleural effusion with left basilar atelectasis/infiltrate.        Right chest tube has been removed. Additional tubes and lines in   place as described.        MACRO:   None        Signed by: Bruno Gomez 7/5/2024 8:05 AM   Dictation workstation:   [id number]      XR chest 1 view   Final Result   1. Linear line at the left lung apex may represent a subtle left   apical pneumothorax. Short-term follow-up with dedicated upright   chest radiograph series is recommended.   2. Left basilar opacity likely small effusion with atelectasis.   3. Mild prominence of the pulmonary interstitium which may relate to   mild pulmonary edema.        MACRO:   Rl Morfin discussed the significance and urgency of this   critical finding epic chat with  CAROL VILLANUEVA on 7/4/2024 at 10:19   am.  (**-RCF-**) Findings:  See findings.        Signed by: Rl Morfin 7/4/2024 10:19 AM   Dictation workstation:    [id number]      XR chest 1 view   Final Result   Interval removal of ET tube and NG tube. Additional support devices   remain in place. Subtle bibasilar opacities.        MACRO:   None.        Signed by: Meredith Pulido 7/3/2024 8:29 AM   Dictation workstation:   [id number]      CT head wo IV contrast   Final Result   1. No acute intracranial abnormality identified.   2. Chronic white matter changes likely reflecting sequela of   small-vessel ischemic disease.   3. Chronic lacunar infarct in the right internal capsule/corona   radiata             If there is concern for superimposed acute ischemia or other   underlying abnormality then MRI may be performed in further   assessment.        MACRO:   None        Signed by: George Galdamez 7/3/2024 2:00 AM   Dictation workstation:   [id number]      XR chest 1 view   Final Result   Interval cardiothoracic surgery. Right-sided atelectasis.        MACRO:   none        Signed by: Mimi Garrido 7/2/2024 3:14 PM   Dictation workstation:   [id number]      Anesthesia Intraoperative Transesophageal Echocardiogram   Final Result      XR chest 2 views    (Results Pending)        Medications  Scheduled medications  acetaminophen, 650 mg, oral, q6h  amLODIPine, 5 mg, oral, Daily  aspirin, 81 mg, oral, Daily  atorvastatin, 80 mg, oral, Nightly  enalapril, 20 mg, oral, BID  enoxaparin, 40 mg, subcutaneous, q24h  insulin lispro, 0-15 Units, subcutaneous, q4h  isosorbide dinitrate, 20 mg, oral, TID  metoprolol tartrate, 25 mg, oral, BID  polyethylene glycol, 17 g, oral, Daily  sennosides-docusate sodium, 2 tablet, oral, BID      Continuous medications  lactated Ringer's, 5 mL/hr, Last Rate: 5 mL/hr (07/06/24 0600)      PRN medications  PRN medications: alteplase, dextrose **OR** glucagon, [Held by provider] HYDROmorphone, naloxone, [Held by provider] oxyCODONE, [Held by provider] oxyCODONE, oxygen, traMADol    Assessment/Plan   Principal Problem:    Coronary artery disease involving  native coronary artery of native heart  Active Problems:    Coronary artery disease involving native coronary artery of native heart with unstable angina pectoris (Multi)    Ms Rubi 68 y/o female with past medical history significant for CAD, HTN, HLD, TYLER, GERD, CVA on Plavix,  NIDDM II, Anemia. Work up for chest pain revealed  Multivessel disease including in stent re-stenosis of the circumflex and proximal LAD. She is admitted to ICU s/p Coronary Artery Bypass Graft x2 ; LIMA --> LAD, EVH RA --> RAMUS --> CX  EVH Right Radial Artery; EVH Right Saphenous Vein; CABG x 3 LIMA to LAD 26 ml/min, PI 3.6  Radial To Ramus, sequenced to Circ 26 ml/min,PI 1.6; Posterior Pericardial Window with Dr Kee on 7/2.     NEURO:  H/O recent Right MCA CVA with residual Lt sided deficit (DAPT)  Acute metabolic encephalopathy - suspect postoperative 2/2 CBP i/s/o recent large Right CVA- improved  Expected acute postop pain controlled  CT head negative for acute/subacute infarcts, redemonstration of prior chronic Rt MCA infarct  - Serial neuro and pain assessments   - Analgesia: Lidoderm patches, scheduled Tylenol and PRN Ultram  - Hold analgesia   - PT/OT/MITT  - Delirium precautions, sleep hygiene              -> Sitting up in bed HOB > 40              -> Lights, TV/music/conversation on; blinds open              -> Avoid narcotics if possible              -> Sleep/wake cycle  - No need for MRI   - Neurology following appreciate recs     CV:  H/O HTN, HLD  MVD with in-stent restenosis s/p CABGx2, PPW  LVEF 65% pre/post  AV wires capped  - Cont EKG, NIBP  - ASA/statin/BB  - Continue home Amlodipine enalapril and imdur  - Hold home Plavix (CVA) for now     PULM:  TYLER  Postop CXR negative for acute process  CXR 7/4 with small L apical PTX  - Wean FiO2 maintaining SpO2 >92%  - [place], IS     :  Preserved renal function (baseline Cr ~0.9)  - RFP daily, replace lytes prn  - Lasix 40 mg IVP bid w/ scheduled potassium  - Remove Lawrence  today if appropriate. Strict I&Os  - Maintain K > 4, Mg > 2     GI:  GERD  - NPO; failed BSE given mental status  - NG with TF  - Bowel regimen- senokot and miralax  - Zofran PRN  - Consult dietician     HEME:  Acute on chronic post op blood loss anemia (baseline Hgb ~9.0) and thrombocytopenia - stable  Postop RUE hematoma surrounding radial harvest site  7/2 POD0 bedside exploration of RUE hematoma involving harvest site  - CBC daily  - DVTppx: SCDs, Lovenox  - Neurovascular assessment of radial graft harvest q1  - Radial graft site care  -> Maintain loose gauze wrap over RUE exploration site              -> Notify ICU/CTS RAMSES if new or worsening firmness, bleeding, or loss of pulse ox or pulses              -> Elevation above heart if possible  - Wound care consulted     ENDO:  NIDDM2  A1c 6.1  - Maintain BG <180  - POCT BG, ISS q4  - Hold home Metformin and Jardiance for now     ID:  Afebrile, no current indications of infection  MRSA negative  Periop abx  - Trend temp, WBCs     Discussed with Dr. Kee via telephone.       This critically ill patient continues to be at-risk for clinically significant deterioration / failure due to the above mentioned dysfunctional, unstable organ systems.  I have personally identified and managed all complex critical care issues with efforts to prevent aforementioned clinical deterioration.  Critical care time is spent at bedside and/or the immediate area and has included, but is not limited to, the review of diagnostic tests, labs, radiographs, serial assessments of hemodynamics, respiratory status, ventilatory management, and family updates.  Time spent in procedures and teaching are reported separately.    CRITICAL CARE TIME: 50 minutes       Urvashi Castaneda, APRN-CNP

## 2024-07-06 NOTE — CONSULTS
Consults    History Of Present Illness  Rita Rubi is a 69 y.o. female presenting with history of coronary issues for which she had CABG.  Since CABG she had some perseverance and encephalopathy.  She does have history of a stroke.  The stroke was negative testing in the past with one-sided weakness with some aphasia as well.  At baseline however it had resolved.  Since CABG in addition to encephalopathy she also has some perseverance of speech.  Therefore neurology was brought on board.  I had detailed discussion with the surgeon who had performed the CABG.  It was believed by the surgical team that the manifestation we are seeing is a recrudescence of old event which is very frequent after such surgical procedures.  Head CT was done at the time of first presentation and it was unremarkable..  Past Medical History  Past Medical History:   Diagnosis Date    Allergic rhinitis     Anemia     Anticoagulated     Plavix    Arthritis     left knee    Coronary artery disease involving native coronary artery of native heart, unspecified whether angina present     GERD (gastroesophageal reflux disease)     under control    Hyperlipidemia     Hypertension     Ischemic stroke (Multi) 09/2023    Cerebral infarction due to thrombosis of right middle cerebral artery    TYLER (obstructive sleep apnea)     does not use CPAP (stopped after her bariatric surgery)    Stuttering     has become worse since her stroke    Type 2 diabetes mellitus (Multi)     1/26/2024 A1C 5.7%     Surgical History  Past Surgical History:   Procedure Laterality Date    BREAST BIOPSY Left     benign    CARDIAC CATHETERIZATION N/A 05/01/2024    Procedure: Left Heart Cath, No LV;  Surgeon: Armond Leyva MD;  Location: [address] Cardiac Cath Lab;  Service: Cardiovascular;  Laterality: N/A;    CATARACT EXTRACTION Left 11/02/2016    CATARACT EXTRACTION Right 12/23/2013    CORONARY ANGIOPLASTY WITH STENT PLACEMENT  03/29/2023    FASCIOTOMY Left 06/01/2011     ENDOSCOPIC FASCIOTOMY PLANTAR    GASTRIC RESTRICTION SURGERY      MR HEAD ANGIO WO IV CONTRAST  09/10/2023    MR HEAD ANGIO WO IV CONTRAST 9/10/2023 [facility]    MR NECK ANGIO WO IV CONTRAST  09/10/2023    MR NECK ANGIO WO IV CONTRAST 9/10/2023 [facility]    TOTAL ABDOMINAL HYSTERECTOMY       Social History  Social History     Tobacco Use    Smoking status: Never     Passive exposure: Never    Smokeless tobacco: Never   Vaping Use    Vaping status: Never Used   Substance Use Topics    Alcohol use: Not Currently    Drug use: Never     Allergies  House dust and Pollen extracts  Medications Prior to Admission   Medication Sig Dispense Refill Last Dose    alpha tocopherol (Vitamin E) 268 mg (400 unit) capsule Take 1 capsule (400 Units) by mouth once daily.   Past Week    amLODIPine (Norvasc) 5 mg tablet TAKE 1 TABLET BY MOUTH EVERY DAY 30 tablet 1 7/2/2024    aspirin 81 mg EC tablet Take 1 tablet (81 mg) by mouth once daily. 90 tablet 1 7/2/2024    atorvastatin (Lipitor) 80 mg tablet Take 1 tablet (80 mg) by mouth once daily at bedtime. 90 tablet 1 7/1/2024    chlorhexidine (Peridex) 0.12 % solution 15 ml swish and spit for 30 seconds night prior to surgery and morning of surgery 473 mL 0 7/2/2024    clopidogrel (Plavix) 75 mg tablet TAKE 1 TABLET BY MOUTH EVERY DAY 30 tablet 1 Past Week    cyanocobalamin (Vitamin B-12) 250 mcg tablet Take 1 tablet (250 mcg) by mouth once daily.   Past Week    docusate sodium (Colace) 100 mg capsule Take 1 capsule (100 mg) by mouth 2 times a day as needed for constipation. 60 capsule 3 Past Week    isosorbide mononitrate ER (Imdur) 30 mg 24 hr tablet Take 3 tablets (90 mg) by mouth once daily. As directed 90 tablet 2 7/2/2024    metoprolol succinate XL (Toprol-XL) 25 mg 24 hr tablet Take 0.5 tablets (12.5 mg) by mouth once daily. 90 tablet 1 7/2/2024 at 0400    mv,calcium,min/iron/folic/vitK (OPTISOURCE ORAL) Take 1 tablet by mouth once daily.   Past Week    ONETOUCH ULTRASOFT LANCETS [facility] [place]  "Use as directed   7/1/2024    empagliflozin (Jardiance) 10 mg Take 1 tablet (10 mg) by mouth once daily. 90 tablet 1     enalapril (Vasotec) 20 mg tablet Take 1 tablet (20 mg) by mouth 2 times a day. 180 tablet 1     fluticasone (Flonase) 50 mcg/actuation nasal spray 1-2 sprays QD (Patient taking differently: Administer 1 spray into each nostril if needed for allergies or rhinitis. 1-2 sprays QD) 16 g 5     furosemide (Lasix) 20 mg tablet TAKE 1 TABLET BY MOUTH EVERY DAY 30 tablet 1     metFORMIN (Glucophage) 500 mg tablet TAKE 1 TABLET BY MOUTH TWICE A DAY WITH MEALS 60 tablet 1     nitroglycerin (Nitrostat) 0.4 mg SL tablet Place 1 tablet (0.4 mg) under the tongue every 5 minutes if needed for chest pain. (Patient not taking: Reported on 6/24/2024) 90 tablet 1        Review of Systems as noted in HPI.  Neurological Exam   Alert oriented x s, follows simple commands, extraocular movements full, face is weaker on the L side.  Facial sensations intact.  Visual field full.  Pupils reactive.  I do not see aphasia or perseverance.  Power intact in RUE and RLE proximally and distally. LUE and LLE is 2- distally and 2 proximally.   Sensations intact in all 4 extremities proximally and distally.  No abnormal involuntary movements noted.     Last Recorded Vitals  Blood pressure 130/58, pulse 103, temperature 36.8 °C (98.2 °F), temperature source Tympanic, resp. rate 17, height 1.689 m (5' 6.5\"), weight 101 kg (222 lb 0.1 oz), SpO2 98%.    Relevant Results  Scheduled medications  acetaminophen, 650 mg, oral, q6h  amLODIPine, 5 mg, oral, Daily  aspirin, 81 mg, oral, Daily  atorvastatin, 80 mg, oral, Nightly  enalapril, 20 mg, oral, BID  enoxaparin, 40 mg, subcutaneous, q24h  insulin lispro, 0-15 Units, subcutaneous, q4h  isosorbide dinitrate, 20 mg, oral, TID  metoprolol tartrate, 25 mg, oral, BID  polyethylene glycol, 17 g, oral, Daily  potassium chloride, 40 mEq, nasogastric tube, Once  sennosides-docusate sodium, 2 tablet, " oral, BID      Continuous medications  lactated Ringer's, 5 mL/hr, Last Rate: 5 mL/hr (07/06/24 0600)      PRN medications  PRN medications: alteplase, dextrose **OR** glucagon, [Held by provider] HYDROmorphone, naloxone, [Held by provider] oxyCODONE, [Held by provider] oxyCODONE, oxygen, traMADol  Results for orders placed or performed during the hospital encounter of 07/02/24 (from the past 24 hour(s))   POCT GLUCOSE   Result Value Ref Range    POCT Glucose 201 (H) 74 - 99 mg/dL   POCT GLUCOSE   Result Value Ref Range    POCT Glucose 176 (H) 74 - 99 mg/dL   POCT GLUCOSE   Result Value Ref Range    POCT Glucose 138 (H) 74 - 99 mg/dL   POCT GLUCOSE   Result Value Ref Range    POCT Glucose 179 (H) 74 - 99 mg/dL   POCT GLUCOSE   Result Value Ref Range    POCT Glucose 176 (H) 74 - 99 mg/dL   CBC   Result Value Ref Range    WBC 8.2 4.4 - 11.3 x10*3/uL    nRBC 0.0 0.0 - 0.0 /100 WBCs    RBC 2.61 (L) 4.00 - 5.20 x10*6/uL    Hemoglobin 7.5 (L) 12.0 - 16.0 g/dL    Hematocrit 21.8 (L) 36.0 - 46.0 %    MCV 84 80 - 100 fL    MCH 28.7 26.0 - 34.0 pg    MCHC 34.4 32.0 - 36.0 g/dL    RDW 16.2 (H) 11.5 - 14.5 %    Platelets 164 150 - 450 x10*3/uL   Magnesium   Result Value Ref Range    Magnesium 2.10 1.60 - 2.40 mg/dL   Renal Function Panel   Result Value Ref Range    Glucose 179 (H) 74 - 99 mg/dL    Sodium 138 136 - 145 mmol/L    Potassium 3.8 3.5 - 5.3 mmol/L    Chloride 104 98 - 107 mmol/L    Bicarbonate 25 21 - 32 mmol/L    Anion Gap 13 10 - 20 mmol/L    Urea Nitrogen 20 6 - 23 mg/dL    Creatinine 0.62 0.50 - 1.05 mg/dL    eGFR >90 >60 mL/min/1.73m*2    Calcium 8.4 (L) 8.6 - 10.3 mg/dL    Phosphorus 2.5 2.5 - 4.9 mg/dL    Albumin 2.7 (L) 3.4 - 5.0 g/dL   POCT GLUCOSE   Result Value Ref Range    POCT Glucose 208 (H) 74 - 99 mg/dL               [place] Coma Scale  Best Eye Response: Spontaneous  Best Verbal Response: Inappropriate words  Best Motor Response: Withdraws to pain  [place] Coma Scale Score: 11                 I have  personally reviewed the following imaging results XR chest 1 view    Result Date: 7/5/2024  Interpreted By:  Bruno Gomez, STUDY: XR CHEST 1 VIEW;  7/5/2024 5:11 am   INDICATION: Signs/Symptoms:Re-evaluation of pneumothorax.   COMPARISON: Most recent prior chest x-ray is from 07/04/2024.   ACCESSION NUMBER(S): [id number]   ORDERING CLINICIAN: FLOYD CUNNINGHAM   TECHNIQUE: Single AP portable view of the chest was obtained.   FINDINGS: MEDIASTINUM/ LUNGS/ LEXA: Recent heart surgery. Mild cardiomegaly. Central vasculature remains mildly prominent. No peripheral interstitial thickening. There is persistent pleural and parenchymal opacity at the left base, unchanged. Right-sided chest tube has been removed. Stable left-sided chest tube. Interval placement of a nasogastric tube with distal tip in the gastric remnant. Distal tip of the right IJ vein catheter is in the distal superior vena cava. Tiny stable pneumothorax on the left in the left lung apex. No pneumothorax evident on the right. No tracheal deviation. No abnormal hilar fullness or gross mass on either side.   BONES: No lytic or blastic destructive bone lesion.   UPPER ABDOMEN: Surgical changes related to previous bariatric surgery..       Right-sided chest tube has been removed. No right-sided pneumothorax.   Interval placement of a NG tube with distal tip in the gastric remnant.   Stable mediastinal drainage catheter, left chest tube, and right IJ vein catheter. Stable tiny left apical pneumothorax.   Persistent left pleural effusion with associated left basilar atelectasis/infiltrate.   Cardiomegaly with mild stable central vascular congestion.   MACRO: None   Signed by: Bruno Gomez 7/5/2024 8:07 AM Dictation workstation:   [id number]    XR abdomen 1 view    Result Date: 7/5/2024  Interpreted By:  Bruno Gomez, STUDY: XR ABDOMEN 1 VIEW;  7/4/2024 1:19 pm   INDICATION: Signs/Symptoms:NG placement.   COMPARISON: Chest x-ray from 07/04/2024   ACCESSION  NUMBER(S): [id number]   ORDERING CLINICIAN: FLOYD CUNNINGHAM   TECHNIQUE:     Supine view of the lower chest and upper abdomen was obtained.   FINDINGS: Status post recent CABG. Distal tip of the right IJ vein catheter is in the distal superior vena cava. Stable mediastinal drainage catheter. Stable left-sided chest tube. Right-sided chest tube has been removed. There is persistent pleural and parenchymal opacity at the left base with obscuration of the left diaphragm. There is an NG tube now in place with distal tip in the mid stomach. Multiple stable medial left upper quadrant abdominal surgical clips. EKG wires overlie the chest. Stable arthritic changes in the spine.       NG tube has been placed with distal tip in the mid stomach.   Previous bariatric surgery.   Stable left pleural effusion with left basilar atelectasis/infiltrate.   Right chest tube has been removed. Additional tubes and lines in place as described.   MACRO: None   Signed by: Bruno Gomez 7/5/2024 8:05 AM Dictation workstation:   [id number]    XR abdomen 1 view    Result Date: 7/4/2024  Interpreted By:  Angi Odonnell, STUDY: XR ABDOMEN 1 VIEW;  7/4/2024 2:22 pm   INDICATION: Signs/Symptoms:NG repositioning.   COMPARISON: None.   ACCESSION NUMBER(S): [id number]   ORDERING CLINICIAN: CAROL VILLANUEVA   FINDINGS: Left-sided chest tube is noted. Sternotomy wires are seen. Nasogastric tube is noted with the distal tip projecting over the left midabdomen.   The heart appears to be enlarged. Increased density seen in the left lung base.       Nasogastric tube with the distal tip projecting over the left midabdomen.     MACRO: None   Signed by: Angi Odonnell 7/4/2024 2:37 PM Dictation workstation:   [id number]    XR chest 1 view    Result Date: 7/4/2024  Interpreted By:  Rl Morfin, STUDY: Chest dated  7/4/2024.   INDICATION: Signs/Symptoms:sob   COMPARISON: Chest dated 07/03/2024.   ACCESSION NUMBER(S): [id number]   ORDERING CLINICIAN: CAROL  RICH   TECHNIQUE: One view of the chest.   FINDINGS: There are bilateral chest tubes mediastinal drain and right IJ sheath with the tip projecting over the mid to lower SVC. Surgical changes are seen of the mediastinum. There is a subtle linear line near the left lung apex. There is left basilar opacity with blunting of the costophrenic angle. There is mild prominence of the pulmonary interstitium. Cardiomediastinal silhouette is prominent but similar to the prior exam.       1. Linear line at the left lung apex may represent a subtle left apical pneumothorax. Short-term follow-up with dedicated upright chest radiograph series is recommended. 2. Left basilar opacity likely small effusion with atelectasis. 3. Mild prominence of the pulmonary interstitium which may relate to mild pulmonary edema.   MACRO: Rl Morfin discussed the significance and urgency of this critical finding epic chat with  CAROL VILLANUEVA on 7/4/2024 at 10:19 am.  (**-RCF-**) Findings:  See findings.   Signed by: Rl Morfin 7/4/2024 10:19 AM Dictation workstation:   [id number]    ECG 12 Lead    Result Date: 7/3/2024  Normal sinus rhythm Normal ECG Confirmed by Kevin Love ([id number]) on 7/3/2024 9:18:20 AM    XR chest 1 view    Result Date: 7/3/2024  Interpreted By:  Meredith Pulido, STUDY: XR CHEST 1 VIEW;  7/3/2024 5:07 am   INDICATION: Signs/Symptoms:Post op cardiac surgery.   COMPARISON: 07/02/2024   ACCESSION NUMBER(S): [id number]   ORDERING CLINICIAN: ANGELIC SINGH   FINDINGS: Interval removal of ET tube and NG tube. Right jugular central line, bilateral chest tubes and mediastinal drain remain in place. Additional presumed overlying monitoring/support devices again seen.   Minimal left-greater-than-right basilar opacities again seen. No pleural effusion or pneumothorax. Cardiac silhouette within normal limits for size status post sternotomy.       Interval removal of ET tube and NG tube. Additional support devices remain  in place. Subtle bibasilar opacities.   MACRO: None.   Signed by: Meredith Pulido 7/3/2024 8:29 AM Dictation workstation:   [id number]    CT head wo IV contrast    Result Date: 7/3/2024  Interpreted By:  George Galdamez, STUDY: CT HEAD WO IV CONTRAST;  7/3/2024 1:46 am   INDICATION: Signs/Symptoms:AMS.   COMPARISON: CT head 09/12/2023   ACCESSION NUMBER(S): [id number]   ORDERING CLINICIAN: RADU MCCORMICK   TECHNIQUE: Noncontrast axial CT images of head were obtained with coronal and sagittal reconstructed images.   FINDINGS: BRAIN PARENCHYMA: Chronic lacunar infarct along the right internal capsule extending into the corona radiata. No acute large territory infarction or transcortical edema evident by CT. No mass-effect, midline shift or effacement of cerebral sulci. Patchy periventricular and subcortical white matter hypodensities, nonspecific but often seen in the setting of chronic microangiopathic change.   HEMORRHAGE: No acute intracranial hemorrhage.   VENTRICLES and EXTRA-AXIAL SPACES: The ventricles and sulci are within normal limits for brain volume. No abnormal extra-axial fluid collection.   ORBITS: The visualized orbits and globes are within normal limits.   EXTRACRANIAL SOFT TISSUES: Within normal limits.   PARANASAL SINUSES/MASTOIDS: The visualized paranasal sinuses and mastoid air cells are well aerated.   CALVARIUM: No depressed skull fracture.         1. No acute intracranial abnormality identified. 2. Chronic white matter changes likely reflecting sequela of small-vessel ischemic disease. 3. Chronic lacunar infarct in the right internal capsule/corona radiata     If there is concern for superimposed acute ischemia or other underlying abnormality then MRI may be performed in further assessment.   MACRO: None   Signed by: George Galdamez 7/3/2024 2:00 AM Dictation workstation:   [id number]    XR chest 1 view    Result Date: 7/2/2024  Interpreted By:  Mimi Garrido, STUDY: XR CHEST 1 VIEW;  7/2/2024 3:02  pm   INDICATION: Signs/Symptoms:Post op cardiac surgery.   COMPARISON: 06/24/2024   ACCESSION NUMBER(S): [id number]   ORDERING CLINICIAN: ANGELIC SINGH   FINDINGS: The heart is normal size.   There are bilateral chest tubes. There is right-sided atelectasis.   An endotracheal tube terminates above the salome.   A nasogastric tube terminates below the diaphragm.   A venous catheter is present on the right. The tip is in the region of superior vena cava.   Sternal wires and mediastinal clips are present. There are surgical clips in the left upper abdomen.   COMPARISON OF FINDING: Interval surgery.       Interval cardiothoracic surgery. Right-sided atelectasis.   MACRO: none   Signed by: Mimi Garrido 7/2/2024 3:14 PM Dictation workstation:   [id number]    Anesthesia Intraoperative Transesophageal Echocardiogram    Result Date: 7/2/2024  [facility] [place] - Dept of Anesthesiology   [address] [place] [address]     Tel [phone] and Fax [phone] TRANSESOPHAGEAL ECHOCARDIOGRAM REPORT  Patient Name:      OSKAR Holm Physician:   Janie Berger MD Study Date:        7/2/2024            Ordering Provider:   Janie BERGER MRN/PID:           [id number]            Fellow: Accession#:        [id number]        Nurse: Date of Birth/Age: 1955 / 69      Sonographer:                    years Gender:            F                   Additional Staff:    Franci Kee MD BSA / BMI:         m2 / kg/m2          Encounter#:          [id number] Blood Pressure:    /                   Department Location: [facility] OR Surgery Study Type:    ANESTHESIA INTRAOPERATIVE FELICIANO Diagnosis/ICD: Atherosclerotic heart disease of native coronary artery with                unstable angina pectoris-I25.110 Indication:    Angina, Unstable CPT Code:      FELICIANO Complete-40393; Doppler Limited-85406; Color Doppler-30700 PHYSICIAN INTERPRETATION: FELICIANO Details: Technically adequate omniplane transesophageal  echocardiogram performed. Left Ventricle: Left ventricular ejection fraction is normal, by visual estimate at 65%. There are no regional wall motion abnormalities. The left ventricular cavity size is decreased. The left ventricular septal wall thickness is mildly increased. There is mildly increased left ventricular posterior wall thickness. There is mild concentric left ventricular hypertrophy. Left ventricular diastolic filling was not assessed. There are no regional wall motion abnormalities and the LV appears underfilled. Left Atrium: The left atrium is mildly dilated. There is no evidence of a patent foramen ovale. There is no thrombus visualized in the left atrial appendage. The LA measures 48 mm in the minor axis consistent with mild dilation. Right Ventricle: The right ventricle is normal in size. There is normal right ventricular global systolic function. Right Atrium: The right atrium is mildly dilated. The RA measures 47 mm in the minor axis consistent with mild RA chamber enlargement. Aortic Valve: The aortic valve appears abnormal. There is evidence of mild aortic valve stenosis. There is mild aortic valve regurgitation. The left coronary cusp is severely restricted although there does not appear to be a severe amount of calcium on this cusp. There is mild aortic valve stenosis with peak and mean gradients of 20 and 9 mmHg, respectively. There is mild aortic insufficiency with a vena contracta width of 0.27 cm. The aortic valve pressure half time is 741 msec consistent with mild AI. Mitral Valve: The mitral valve is normal in structure. There is trace mitral valve regurgitation which is centrally directed. Tricuspid Valve: The tricuspid valve is structurally normal. There is no evidence of tricuspid valve stenosis. There is trace tricuspid regurgitation. The tricuspid valve regurgitant jet is centrally directed. Pulmonic Valve: The pulmonic valve is structurally normal. There is no indication of  pulmonic valve regurgitation. Pericardium: There is no pericardial effusion noted. Aorta: The aortic root is abnormal. There is no plaque visualized in the ascending aorta. There is plaque visualized in the transverse aorta, which is classified as a Grade 3 [moderate atheroma >3-5 mm (no mobile/ulcerated component)] atherosclerosis. There is plaque visualized in the descending aorta which is classified as a Grade 2 [mild (focal or diffuse) intimal thickening of 2-3 mm] atherosclerosis. The left coronary cusp of the aortic valve is significantly restricted. Pulmonary Veins: The left upper pulmonary vein is normal. In comparison to the previous echocardiogram(s): Not performed on intraoperative study.  CONCLUSIONS:  1. Left ventricular ejection fraction is normal, by visual estimate at 65%.  2. Left ventricular cavity size is decreased.  3. There is normal right ventricular global systolic function.  4. The left atrium is mildly dilated.  5. There is No tricuspid stenosis.  6. Aortic valve appears abnormal.  7. Mild aortic valve stenosis.  8. Mild aortic valve regurgitation.  9. There is no evidence of a patent foramen ovale. 10. There is plaque visualized in the descending aorta. 11. There is plaque visualized in the transverse aorta. POST CARDIOPULMONARY BYPASS REPORT: Patient is being paced. The patient is on norepinephrine 0.02 mcg/kg/min. Global LV function is normal. The LV appears underfilled. Global RV function is normal. There is mild aortic regurgitation. There is a central jet of AI with peak and mean gradients of 20 and 7 mmHg respectively. There is trace mitral regurgitation. There is mild tricuspid regurgitation. No evidence of post aortic cannulation dissection. The post-CPB images were discussed with Dr. DORY Kee.  QUANTITATIVE DATA SUMMARY: 2D MEASUREMENTS:                Normal Ranges: IVSd:  0.99 cm (0.6-1.1cm) LVPWd: 1.14 cm (0.6-1.1cm) LVIDd: 3.83 cm (3.9-5.9cm) AORTA MEASUREMENTS:                       Normal Ranges: AoV Nichelle,s:   2.04 cm (1.4-2.6cm) Ao Sinus, s: 2.98 cm Ao STJ, s:   2.61 cm LV SYSTOLIC FUNCTION BY 2D PLANIMETRY (MOD):                      Normal Ranges: EF-Visual:      65 % LV EF Reported: 65 % AORTIC VALVE:                            Normal Ranges: LVOT Diameter:    1.88 cm  (1.8-2.4cm) AoV Area, planim: 1.81 cm2 (2.5-4.5cm2)  RIGHT VENTRICLE: TAPSE: 18.4 mm  [id number] Willem Schwarz MD Electronically signed on 7/2/2024 at 2:20:36 PM  ** Final **     XR chest 2 views    Result Date: 6/26/2024  Interpreted By:  Chelle Dean, STUDY: XR CHEST 2 VIEWS; ;  6/24/2024 12:35 pm   INDICATION: Signs/Symptoms:pre-op.   COMPARISON: None.   ACCESSION NUMBER(S): [id number]   ORDERING CLINICIAN: SHAINA SANFORD   FINDINGS: Two views of the chest were performed.   Cardiac silhouette is normal in size and morphology. Bilateral lungs are clear without airspace opacities. No large pleural effusions or pneumothorax. No acute osseous findings.       No acute cardiopulmonary process.     MACRO: None   Signed by: Chelle Dean 6/26/2024 11:30 AM Dictation workstation:   [id number]    Vascular US carotid artery duplex bilateral    Result Date: 6/25/2024             [place] [address]   Tel [phone] and Fax [phone]  [facility]  Patient Name:      OSKAR Holm Physician: [id number] Mary Rodríguez MD Study Date:        6/24/2024           Ordering           [id number] SHAINA SANFORD                                        Physician: MRN/PID:           [id number]            Technologist:      Holly Arango RVT Accession#:        [id number]        Technologist 2: Date of Birth/Age: 1955 / 69      Encounter#:        [id number]                    years Gender:            F Admission Status:  Outpatient          Location           [facility]                                         Performed:  Diagnosis/ICD: Occlusion and stenosis of bilateral carotid arteries-I65.23 CPT Codes:     59792 Cerebrovascular Carotid Duplex scan complete  CONCLUSIONS: Right Carotid: Findings are consistent with less than 50% stenosis of the right proximal internal carotid artery. The right proximal internal carotid artery is normal. Right external carotid artery appears patent with no evidence of stenosis. No evidence of hemodynamically significant stenosis of the right common carotid artery. The right vertebral artery is patent with antegrade flow. No evidence of hemodynamically significant stenosis in the right subclavian artery. Left Carotid: Findings are consistent with less than 50% stenosis of the left proximal internal carotid artery. The left proximal internal carotid artery is normal. Left external carotid artery appears patent with no evidence of stenosis. No evidence of hemodynamically significant stenosis of the left common carotid artery. The left vertebral artery is patent with antegrade flow. No evidence of hemodynamically significant stenosis in the left subclavian artery.  Imaging & Doppler Findings: Right Plaque Morph: The proximal right internal carotid artery demonstrates heterogenous plaque. The proximal right external carotid artery demonstrates heterogenous plaque. The distal right common carotid artery demonstrates heterogenous plaque. The right carotid bulb demonstrates heterogenous plaque. Left Plaque Morph: The proximal left internal carotid artery demonstrates heterogenous plaque. The proximal left external carotid artery demonstrates heterogenous plaque. The distal left common carotid artery demonstrates heterogenous plaque. The left carotid bulb demonstrates heterogenous plaque.   Right                        Left   PSV      EDV                PSV      EDV 64 cm/s            CCA P    75 cm/s 52 cm/s            CCA D    53 cm/s 47 cm/s  18 cm/s   ICA P    75  cm/s  26 cm/s 70 cm/s  23 cm/s   ICA D    62 cm/s  20 cm/s 70 cm/s             ECA     40 cm/s 42 cm/s  9 cm/s  Vertebral  55 cm/s  12 cm/s 101 cm/s         Subclavian 135 cm/s  Right                       Left   PSV    Waveform            PSV  Waveform 133 cm/s          Innominate               Right Left ICA/CCA Ratio  0.9  1.4   [id number] Mary Rodríguez MD Electronically signed by [id number] Mary Rodríguez MD on 6/25/2024 at 5:20:16 PM  ** Final **     CT chest abdomen pelvis wo IV contrast    Result Date: 6/25/2024  Interpreted By:  Vivek Magdaleno, STUDY: CT CHEST ABDOMEN PELVIS WO CONTRAST; ;  6/24/2024 1:06 pm   INDICATION: Signs/Symptoms:pre-op.   COMPARISON: None.   ACCESSION NUMBER(S): [id number]   ORDERING CLINICIAN: SHAINA SANFORD   TECHNIQUE: Serial axial unenhanced CT images obtained of the chest, abdomen and pelvis. Images reformatted in the coronal and sagittal projection   All CT examinations are performed with 1 or more of the following dose reduction techniques: Automated exposure control, adjustment of mA and/or kv according to patient's size, or use of iterative reconstruction techniques.   FINDINGS: CT chest:   Mediastinum demonstrates no lymphadenopathy. There is no hilar lymphadenopathy. Esophagus is unremarkable   Heart and great vessels demonstrate ascending thoracic aorta to measure 3.4 cm. There is mild vascular calcification at the level of the aortic valve plane. Characterization of the left coronary sinus demonstrates minimal vascular calcification. Subtle calcification at the sino-tubular junction. The ascending thoracic aorta demonstrates no vascular calcification. No significant calcified plaques. Main pulmonary artery is unremarkable.   Lung parenchyma demonstrates no infiltrate or effusion.   Visualized osseous structures demonstrate mild multilevel anterior osteophyte formation mid to lower thoracic spine. No compression deformity demonstrated.   CT abdomen:   Liver is unremarkable within  limits of this unenhanced CT   Gallbladder is unremarkable   Spleen and adrenal glands are unremarkable   Pancreas is unremarkable within limits of this unenhanced CT   Right kidney is unremarkable   Left kidney is unremarkable   Retroperitoneum demonstrates no lymphadenopathy. Mild vascular calcification of the abdominal aorta.   Loops of large bowel demonstrate uncomplicated colonic diverticulosis in particular within the sigmoid colon. Small bowel loops are nondilated. Stomach demonstrates postsurgical changes status post gastric sleeve procedure.   CT pelvis:   Unopacified bladder is unremarkable. There is no pelvic lymphadenopathy. No free fluid demonstrated.   Visualized osseous structures demonstrate mild facet arthropathy lower lumbar spine. Mild loss disc space height at L4/5. Moderate facet arthropathy demonstrated L4/5 and L5/S1.           1. Mild vascular calcification of the aortic valve plane and coronary sinus. However, ascending thoracic aorta demonstrates no calcified plaques.   2. No infiltrate in the chest   3. Postsurgical changes status post gastric sleeve procedure   4. Uncomplicated colonic diverticulosis in particular of the sigmoid colon     MACRO: None   Signed by: Vivek Magdaleno 6/25/2024 4:46 PM Dictation workstation:   [id number]    ECG 12 lead (Clinic Performed)    Result Date: 6/25/2024   Poor data quality, interpretation may be adversely affected Normal sinus rhythm Nonspecific T wave abnormality Abnormal ECG When compared with ECG of 12-SEP-2023 18:38, Previous ECG has undetermined rhythm, needs review Confirmed by Too Olsen ([id number]) on 6/25/2024 11:53:53 AM  .      Assessment/Plan   Principal Problem:    Coronary artery disease involving native coronary artery of native heart  Active Problems:    Coronary artery disease involving native coronary artery of native heart with unstable angina pectoris (Multi)  The patient is a 69-year-old woman with history of vascular risk factors  presenting with previous history of stroke which manifested as aphasia and unilateral weakness but at baseline normal or much recovered if not normal.  After CABG she had an episode when she was having encephalopathy and perseverance.  While part of my discussion with surgeon these are common sequelae of type of procedure she had especially if they have a stroke they would really have recrudescence.  It is believed by the surgical team that this could be 1 of such recrudescence.  I do believe that this suspicion might be correct.  We discussed that recrudescence would recover over period of time therefore if she does not recover by Monday we will get the brain MRI.  In the meantime she is already covered for any putative vascular regimen.  Will follow through again to determine whether she needs an MRI.       I spent 60 minutes in the professional and overall care of this patient.      Aasef G Shaikh, MD PhD

## 2024-07-06 NOTE — ANESTHESIA POSTPROCEDURE EVALUATION
Patient: Rita Rubi    Procedure Summary       Date: 07/02/24 Room / Location: [facility] A OR 10 / Virtual U A OR    Anesthesia Start: 0800 Anesthesia Stop: 1450    Procedure: Coronary Artery Bypass Graft x2 ; LIMA --> LAD, EVH RA --> RAMUS --> CX (Chest) Diagnosis:       Coronary artery disease involving native coronary artery of native heart, unspecified whether angina present      (Coronary artery disease involving native coronary artery of native heart, unspecified whether angina present [I25.10])    Surgeons: Franci Kee MD Responsible Provider: Willem Schwarz MD    Anesthesia Type: general ASA Status: 3            Anesthesia Type: general    Vitals Value Taken Time   /58 07/06/24 0813   Temp 36.8 °C (98.2 °F) 07/06/24 0800   Pulse 92 07/06/24 0958   Resp 17 07/05/24 1630   SpO2 98 % 07/06/24 0958   Vitals shown include unfiled device data.    Anesthesia Post Evaluation    Patient location during evaluation: PACU  Patient participation: complete - patient participated  Level of consciousness: awake  Pain management: adequate  Airway patency: patent  Cardiovascular status: acceptable  Respiratory status: acceptable  Hydration status: acceptable  Postoperative Nausea and Vomiting: none        No notable events documented.

## 2024-07-07 VITALS
OXYGEN SATURATION: 100 % | BODY MASS INDEX: 35.79 KG/M2 | HEART RATE: 98 BPM | WEIGHT: 222.66 LBS | RESPIRATION RATE: 17 BRPM | SYSTOLIC BLOOD PRESSURE: 129 MMHG | DIASTOLIC BLOOD PRESSURE: 76 MMHG | TEMPERATURE: 98.2 F | HEIGHT: 66 IN

## 2024-07-07 LAB
ALBUMIN SERPL BCP-MCNC: 3 G/DL (ref 3.4–5)
ANION GAP SERPL CALC-SCNC: 11 MMOL/L (ref 10–20)
BUN SERPL-MCNC: 24 MG/DL (ref 6–23)
CALCIUM SERPL-MCNC: 9.2 MG/DL (ref 8.6–10.3)
CHLORIDE SERPL-SCNC: 104 MMOL/L (ref 98–107)
CO2 SERPL-SCNC: 28 MMOL/L (ref 21–32)
CREAT SERPL-MCNC: 0.72 MG/DL (ref 0.5–1.05)
EGFRCR SERPLBLD CKD-EPI 2021: 90 ML/MIN/1.73M*2
ERYTHROCYTE [DISTWIDTH] IN BLOOD BY AUTOMATED COUNT: 15.9 % (ref 11.5–14.5)
GLUCOSE BLD MANUAL STRIP-MCNC: 164 MG/DL (ref 74–99)
GLUCOSE BLD MANUAL STRIP-MCNC: 174 MG/DL (ref 74–99)
GLUCOSE BLD MANUAL STRIP-MCNC: 214 MG/DL (ref 74–99)
GLUCOSE BLD MANUAL STRIP-MCNC: 238 MG/DL (ref 74–99)
GLUCOSE BLD MANUAL STRIP-MCNC: 242 MG/DL (ref 74–99)
GLUCOSE SERPL-MCNC: 230 MG/DL (ref 74–99)
HCT VFR BLD AUTO: 23.3 % (ref 36–46)
HGB BLD-MCNC: 8.5 G/DL (ref 12–16)
MAGNESIUM SERPL-MCNC: 2.1 MG/DL (ref 1.6–2.4)
MCH RBC QN AUTO: 29.6 PG (ref 26–34)
MCHC RBC AUTO-ENTMCNC: 36.5 G/DL (ref 32–36)
MCV RBC AUTO: 81 FL (ref 80–100)
NRBC BLD-RTO: 0 /100 WBCS (ref 0–0)
PHOSPHATE SERPL-MCNC: 2.6 MG/DL (ref 2.5–4.9)
PLATELET # BLD AUTO: 225 X10*3/UL (ref 150–450)
POTASSIUM SERPL-SCNC: 4.1 MMOL/L (ref 3.5–5.3)
RBC # BLD AUTO: 2.87 X10*6/UL (ref 4–5.2)
SODIUM SERPL-SCNC: 139 MMOL/L (ref 136–145)
WBC # BLD AUTO: 7.3 X10*3/UL (ref 4.4–11.3)

## 2024-07-07 PROCEDURE — 97530 THERAPEUTIC ACTIVITIES: CPT | Mod: GP

## 2024-07-07 PROCEDURE — 37799 UNLISTED PX VASCULAR SURGERY: CPT | Performed by: NURSE PRACTITIONER

## 2024-07-07 PROCEDURE — 9420000001 HC RT PATIENT EDUCATION 5 MIN

## 2024-07-07 PROCEDURE — 94668 MNPJ CHEST WALL SBSQ: CPT

## 2024-07-07 PROCEDURE — 97166 OT EVAL MOD COMPLEX 45 MIN: CPT | Mod: GO

## 2024-07-07 PROCEDURE — 99291 CRITICAL CARE FIRST HOUR: CPT | Performed by: STUDENT IN AN ORGANIZED HEALTH CARE EDUCATION/TRAINING PROGRAM

## 2024-07-07 PROCEDURE — 82947 ASSAY GLUCOSE BLOOD QUANT: CPT

## 2024-07-07 PROCEDURE — 2500000004 HC RX 250 GENERAL PHARMACY W/ HCPCS (ALT 636 FOR OP/ED): Performed by: NURSE PRACTITIONER

## 2024-07-07 PROCEDURE — 2060000001 HC INTERMEDIATE ICU ROOM DAILY

## 2024-07-07 PROCEDURE — 99291 CRITICAL CARE FIRST HOUR: CPT | Performed by: NURSE PRACTITIONER

## 2024-07-07 PROCEDURE — 2500000001 HC RX 250 WO HCPCS SELF ADMINISTERED DRUGS (ALT 637 FOR MEDICARE OP)

## 2024-07-07 PROCEDURE — 94660 CPAP INITIATION&MGMT: CPT

## 2024-07-07 PROCEDURE — 85027 COMPLETE CBC AUTOMATED: CPT | Performed by: NURSE PRACTITIONER

## 2024-07-07 PROCEDURE — 97162 PT EVAL MOD COMPLEX 30 MIN: CPT | Mod: GP

## 2024-07-07 PROCEDURE — 2500000001 HC RX 250 WO HCPCS SELF ADMINISTERED DRUGS (ALT 637 FOR MEDICARE OP): Performed by: NURSE PRACTITIONER

## 2024-07-07 PROCEDURE — 80069 RENAL FUNCTION PANEL: CPT | Performed by: NURSE PRACTITIONER

## 2024-07-07 PROCEDURE — 83735 ASSAY OF MAGNESIUM: CPT | Performed by: NURSE PRACTITIONER

## 2024-07-07 PROCEDURE — 2500000002 HC RX 250 W HCPCS SELF ADMINISTERED DRUGS (ALT 637 FOR MEDICARE OP, ALT 636 FOR OP/ED): Performed by: NURSE PRACTITIONER

## 2024-07-07 RX ORDER — MULTIVIT-MIN/IRON FUM/FOLIC AC 7.5 MG-4
1 TABLET ORAL DAILY
Status: DISCONTINUED | OUTPATIENT
Start: 2024-07-07 | End: 2024-07-10 | Stop reason: HOSPADM

## 2024-07-07 RX ORDER — METOPROLOL TARTRATE 50 MG/1
50 TABLET ORAL 2 TIMES DAILY
Status: DISCONTINUED | OUTPATIENT
Start: 2024-07-07 | End: 2024-07-10 | Stop reason: HOSPADM

## 2024-07-07 ASSESSMENT — COGNITIVE AND FUNCTIONAL STATUS - GENERAL
DAILY ACTIVITIY SCORE: 6
EATING MEALS: TOTAL
EATING MEALS: TOTAL
WALKING IN HOSPITAL ROOM: TOTAL
DRESSING REGULAR LOWER BODY CLOTHING: TOTAL
STANDING UP FROM CHAIR USING ARMS: TOTAL
TOILETING: TOTAL
MOVING TO AND FROM BED TO CHAIR: TOTAL
CLIMB 3 TO 5 STEPS WITH RAILING: TOTAL
HELP NEEDED FOR BATHING: TOTAL
MOBILITY SCORE: 6
HELP NEEDED FOR BATHING: TOTAL
DRESSING REGULAR LOWER BODY CLOTHING: TOTAL
DRESSING REGULAR UPPER BODY CLOTHING: A LOT
PERSONAL GROOMING: TOTAL
TURNING FROM BACK TO SIDE WHILE IN FLAT BAD: TOTAL
PERSONAL GROOMING: A LOT
DAILY ACTIVITIY SCORE: 8
MOVING FROM LYING ON BACK TO SITTING ON SIDE OF FLAT BED WITH BEDRAILS: TOTAL
DRESSING REGULAR UPPER BODY CLOTHING: TOTAL
TOILETING: TOTAL

## 2024-07-07 ASSESSMENT — ACTIVITIES OF DAILY LIVING (ADL)
ADL_ASSISTANCE: INDEPENDENT
ADL_ASSISTANCE: INDEPENDENT

## 2024-07-07 ASSESSMENT — ENCOUNTER SYMPTOMS
FEVER: 0
PSYCHIATRIC NEGATIVE: 1
DIARRHEA: 0
NAUSEA: 0
PALPITATIONS: 0
CHILLS: 0
WHEEZING: 0
SPEECH DIFFICULTY: 0
ARTHRALGIAS: 0
STRIDOR: 0
ALLERGIC/IMMUNOLOGIC NEGATIVE: 1
CONSTIPATION: 0
HEMATURIA: 0
VOMITING: 0
DIZZINESS: 0
DYSURIA: 0
SHORTNESS OF BREATH: 0
FATIGUE: 1
EYES NEGATIVE: 1
COUGH: 1
MUSCULOSKELETAL NEGATIVE: 1
HEADACHES: 0
TROUBLE SWALLOWING: 1
POLYPHAGIA: 0
WEAKNESS: 1
POLYDIPSIA: 0
ACTIVITY CHANGE: 1
ABDOMINAL PAIN: 0
ABDOMINAL DISTENTION: 0

## 2024-07-07 ASSESSMENT — PAIN - FUNCTIONAL ASSESSMENT
PAIN_FUNCTIONAL_ASSESSMENT: 0-10

## 2024-07-07 ASSESSMENT — PAIN SCALES - WONG BAKER: WONGBAKER_NUMERICALRESPONSE: HURTS LITTLE MORE

## 2024-07-07 ASSESSMENT — PAIN SCALES - GENERAL
PAINLEVEL_OUTOF10: 0 - NO PAIN

## 2024-07-07 NOTE — PROGRESS NOTES
07/07/24 9695   Discharge Planning   Patient expects to be discharged to: HHC vs SNF     Left vm for patient's brother to discuss discharge plans.  Patient recommended MOD by therapy.  SNF list printed from [facility] left at bedside.  Let brother know that I can email list as well.  Waiting on return call.

## 2024-07-07 NOTE — PROGRESS NOTES
Physical Therapy    Physical Therapy Evaluation & Treatment    Patient Name: Rita Rubi  MRN: [id number]  Today's Date: 7/7/2024   Time Calculation  Start Time: 0825  Stop Time: 0857  Time Calculation (min): 32 min    Assessment/Plan   PT Assessment  PT Assessment Results: Decreased strength, Decreased range of motion, Decreased endurance, Impaired balance, Decreased mobility, Decreased coordination, Pain, Orthopedic restrictions  Rehab Prognosis: Fair  Barriers to Discharge: Needing 2 person assist for all mobility, unable to hold trunk for upright sitting  Evaluation/Treatment Tolerance: Patient limited by fatigue  Medical Staff Made Aware: Yes  Strengths: Premorbid level of function, Housing layout  Barriers to Participation: Comorbidities  End of Session Communication: Bedside nurse, Care Coordinator  Assessment Comment: PT eval complete. Pt limited in participation, limited in strength, and limited in endurance. Needing increased hands on assist for all OOB at this time. Needing continued PT for addressing functional deficits.    End of Session Patient Position: Up in chair, Alarm on   IP OR SWING BED PT PLAN  Inpatient or Swing Bed: Inpatient  PT Plan  Treatment/Interventions: Bed mobility, Transfer training, Gait training, Balance training, Neuromuscular re-education, Strengthening, Endurance training, Therapeutic exercise, Home exercise program, Therapeutic activity, Positioning, Postural re-education  PT Plan: Ongoing PT  PT Frequency: 5 times per week  PT Discharge Recommendations: Moderate intensity level of continued care  Equipment Recommended upon Discharge: Wheelchair, Lift  PT Recommended Transfer Status: Total assist  PT - OK to Discharge: Yes (Per PT POC)      Subjective     General Visit Information:  General  Reason for Referral: Recent Cardiothoracic sx: 69 y.o. female s/p CABG x2 on 7/2 w/ increase weakness  Referred By: MD Ace  Past Medical History Relevant to Rehab:   Past Medical History:    Diagnosis Date    Allergic rhinitis     Anemia     Anticoagulated     Plavix    Arthritis     left knee    Coronary artery disease involving native coronary artery of native heart, unspecified whether angina present     GERD (gastroesophageal reflux disease)     under control    Hyperlipidemia     Hypertension     Ischemic stroke (Multi) 09/2023    Cerebral infarction due to thrombosis of right middle cerebral artery    TYLER (obstructive sleep apnea)     does not use CPAP (stopped after her bariatric surgery)    Stuttering     has become worse since her stroke    Type 2 diabetes mellitus (Multi)     1/26/2024 A1C 5.7%     Past Surgical History:   Procedure Laterality Date    BREAST BIOPSY Left     benign    CARDIAC CATHETERIZATION N/A 05/01/2024    Procedure: Left Heart Cath, No LV;  Surgeon: Armond Leyva MD;  Location: [address] Cardiac Cath Lab;  Service: Cardiovascular;  Laterality: N/A;    CATARACT EXTRACTION Left 11/02/2016    CATARACT EXTRACTION Right 12/23/2013    CORONARY ANGIOPLASTY WITH STENT PLACEMENT  03/29/2023    FASCIOTOMY Left 06/01/2011    ENDOSCOPIC FASCIOTOMY PLANTAR    GASTRIC RESTRICTION SURGERY      MR HEAD ANGIO WO IV CONTRAST  09/10/2023    MR HEAD ANGIO WO IV CONTRAST 9/10/2023 [facility] MRI    MR NECK ANGIO WO IV CONTRAST  09/10/2023    MR NECK ANGIO WO IV CONTRAST 9/10/2023 [facility] MRI    TOTAL ABDOMINAL HYSTERECTOMY         Family/Caregiver Present: No  Co-Treatment: OT  Co-Treatment Reason: To maximize pt safety, mobility and performance  Prior to Session Communication: Bedside nurse  Patient Position Received: Bed, 3 rail up, Alarm off, not on at start of session  Preferred Learning Style: auditory, kinesthetic, verbal, visual  General Comment: Pt supine in bed upon arrival, agreeable and pleasant to OT/PT eval. Demo increase fatigue and weakness, answering queries appropriately, but demo's increased lethargy throughout.  Home Living:  Home Living  Type of Home: House  Lives With: Alone  "(Brother, nieces and nephews next door)  Home Adaptive Equipment: Walker rolling or standard, Reacher, Long-handled shoehorn, Long-handled sponge  Home Layout: One level, Laundry in basement  Home Access: Stairs to enter with rails  Entrance Stairs-Rails: Both  Entrance Stairs-Number of Steps: 6  Bathroom Shower/Tub: Walk-in shower  Bathroom Toilet: Handicapped height  Bathroom Equipment: Grab bars in shower (Shower stool)  Prior Level of Function:  Prior Function Per Pt/Caregiver Report  Level of [place]: Independent with ADLs and functional transfers, Independent with homemaking with ambulation  Receives Help From: Family  ADL Assistance: Independent  Homemaking Assistance: Independent  Ambulatory Assistance: Independent (has WW, but ambulatory without device prior to sx, denies falls.)  Prior Function Comments: + drive, report independent in all ADLs and iADLs.  Precautions:  Precautions  Medical Precautions: Fall precautions, Cardiac precautions  Post-Surgical Precautions: Move in the Tube (Logroll)  Precautions Comment: Hx of L affected CVA, reports R side is \"strong side\"  Vital Signs:  Vital Signs  Heart Rate: 105 (> 106)  Heart Rate Source: Monitor  SpO2: 96 % (on RA)  BP: 126/69 (> 121/62)  MAP (mmHg): 84 (>77)  BP Location: Left arm  BP Method: Automatic  Patient Position: Lying (> up in chair)    Objective   Pain:  Pain Assessment  Pain Assessment: 0-10  0-10 (Numeric) Pain Score: 0 - No pain (Denies pain initially, then stating 4/10 during mobility)  Pain Type: Surgical pain  Pain Location: Incision  Pain Frequency: Intermittent (with movement)  Pain Onset: Ongoing  Clinical Progression: Not changed  Pain Interventions: Repositioned, Ambulation/increased activity, Distraction  Response to Interventions: Able to participate in upright sitting with use of true rise chair as per therapy request  Cognition:  Cognition  Overall Cognitive Status: Within Functional Limits  Orientation Level: Oriented X4 " (Able to self-correct time (month))  Cognition Comments: Intermittent confusion; increase fatigue and lethargy.  Attention: Exceptions to WFL  Insight: Mild    General Assessments:  General Observation  General Observation: Pt pleasant and answering queries appropriately in bed, but highly lethargic, needing lateral transfer to true rise chair               Activity Tolerance  Endurance: Tolerates 10 - 20 min exercise with multiple rests  Early Mobility/Exercise Safety Screen: Proceed with mobilization - No exclusion criteria met    Sensation  Light Touch: No apparent deficits  Sensation Comment: Denies numbness/tingling    Strength  Strength Comments: Needing 2 person assist for all transfers.  Strength  Strength Comments: Needing 2 person assist for all transfers.    Perception  Inattention/Neglect:  (Cues for attention and arousal due to high levels of fatigue and lethargy.)  Initiation: Cues to initiate tasks      Coordination  Movements are Fluid and Coordinated: No  Upper Body Coordination: Limited strength and AROM of BLE  Lower Body Coordination: Limited mobility of BLE due to decreased strength    Postural Control  Postural Control: Impaired  Head Control: Limited head control with stabilization during mobility  Trunk Control: Limited trunk control, needing propping in chair for stabilization  Posture Comment: Limited control, likely from prolonged bedrest    Static Sitting Balance  Static Sitting-Balance Support: Feet supported, Bilateral upper extremity supported  Static Sitting-Level of Assistance: Close supervision  Static Sitting-Comment/Number of Minutes: Use of chair controls and foot rest for stabilization with use of pillows, no true hands on assist.  Dynamic Sitting Balance  Dynamic Sitting-Comments: Unable to determine due to lack of ability to perform in true rise chair    Static Standing Balance  Static Standing-Comment/Number of Minutes: Grossly inappropriate at this time  Functional  Assessments:  Bed Mobility  Bed Mobility: Yes  Bed Mobility 1  Bed Mobility 1: Scooting  Level of Assistance 1: Dependent, +2  Bed Mobility Comments 1: Supine lateral scooting from bed to chair x2 assist with use of draw sheet, bed to true rise chair    Transfers  Transfer: No (Not safe at this time, increase fatigue, lethargy and weakness.)    Ambulation/Gait Training  Ambulation/Gait Training Performed: No    Stairs  Stairs: No  Extremity/Trunk Assessments:  RUE   RUE : Exceptions to WFL (Shoulder elevation to 90 degrees, distal WFL: Grossly greater than or equal 3-/5)  LUE   LUE: Exceptions to WFL (Shoulder to 70 degrees, distal WFL, but noted weaker than R side: Grossly greater than or equal 3-/5)  RLE   RLE : Exceptions to WFL (PROM WFL, but limited AROM </=10% of full ROM, MMT: </= 2-/5, noted tightness in heel cords for neutral placement of ankle)  LLE   LLE : Exceptions to WFL (PROM WFL, but limited AROM </=10% of full ROM, MMT: </= 2-/5, noted tightness in heel cords for neutral placement of ankle)  Treatments:  Other Activity  Other Activity Performed: Yes  Other Activity 1: Increased education to pt about plan of care, progression with therapy, HEP/Precautions packet, and participation.  Outcome Measures:  [facility] Basic Mobility  Turning from your back to your side while in a flat bed without using bedrails: Total  Moving from lying on your back to sitting on the side of a flat bed without using bedrails: Total  Moving to and from bed to chair (including a wheelchair): Total  Standing up from a chair using your arms (e.g. wheelchair or bedside chair): Total  To walk in hospital room: Total  Climbing 3-5 steps with railing: Total  Basic Mobility - Total Score: 6    FSS-ICU  Ambulation: Unable to attempt due to weakness  Rolling: Maximal assistance (performs 25% - 49% of task)  Sitting: Total assistance (performs 25% or requires another person)  Transfer Sit-to-Stand: Unable to perform  Transfer  Supine-to-Sit: Total assistance (performs 25% or requires another person)  Total Score: 4      E = Exercise and Early Mobility  Early Mobility/Exercise Safety Screen: Proceed with mobilization - No exclusion criteria met  ICU Mobility Scale: Passively moved to chair (no standing)    Encounter Problems       Encounter Problems (Active)       Balance       STG - Maintains static sitting balance with upper extremity support At Min Ax1, safely, without LOB, device PRN        Start:  07/07/24    Expected End:  07/21/24       INTERVENTIONS:  1. Practice sitting on the edge of a bed/mat with minimal support.  2. Educate patient about maintining total hip precautions while maintaining balance.  3. Educate patient about pressure relief.  4. Educate patient about use of assistive device.         STG - Maintains dynamic sitting balance with upper extremity support At Mod Ax1, safely, without LOB, device PRN        Start:  07/07/24    Expected End:  07/21/24       INTERVENTIONS:  1. Practice sitting on the edge of a bed/mat with minimal support.  2. Educate patient about maintining total hip precautions while maintaining balance.  3. Educate patient about pressure relief.  4. Educate patient about use of assistive device.            Mobility       Endurance - Pt to tolerate >/= 20 minutes therex, theract, and/or NMR with </= 5 minutes of rest breaks        Start:  07/07/24    Expected End:  07/21/24               PT Transfers       STG - Patient will perform bed mobility At Min Ax1, safely, without LOB, device PRN        Start:  07/07/24    Expected End:  07/21/24            STG - Patient will roll At Min Ax1, safely, without LOB, device PRN        Start:  07/07/24    Expected End:  07/21/24            STG - Patient will transfer sit to and from stand At Max Ax1, safely, without LOB, device PRN        Start:  07/07/24    Expected End:  07/21/24               Pain - Adult          Safety       Pt will verbalize and apply safety  awareness and precautions 100% of time throughout entire session         Start:  07/07/24    Expected End:  07/21/24                   Education Documentation  Handouts, taught by Gato Bernardo PT at 7/7/2024 10:25 AM.  Learner: Patient  Readiness: Acceptance  Method: Demonstration, Explanation, Handout  Response: Needs Reinforcement  Comment: Attempted education about HEP/Precautions for MITT packet, pt too lethargic, unsure of education acceptance    Precautions, taught by Gato Bernardo PT at 7/7/2024 10:25 AM.  Learner: Patient  Readiness: Acceptance  Method: Demonstration, Explanation, Handout  Response: Needs Reinforcement  Comment: Attempted education about HEP/Precautions for MITT packet, pt too lethargic, unsure of education acceptance    Body Mechanics, taught by Gato Bernardo PT at 7/7/2024 10:25 AM.  Learner: Patient  Readiness: Acceptance  Method: Demonstration, Explanation, Handout  Response: Needs Reinforcement  Comment: Attempted education about HEP/Precautions for MITT packet, pt too lethargic, unsure of education acceptance    Mobility Training, taught by Gato Bernardo PT at 7/7/2024 10:25 AM.  Learner: Patient  Readiness: Acceptance  Method: Demonstration, Explanation, Handout  Response: Needs Reinforcement  Comment: Attempted education about HEP/Precautions for MITT packet, pt too lethargic, unsure of education acceptance    Education Comments  No comments found.

## 2024-07-07 NOTE — PROGRESS NOTES
Rita Rubi is a 69 y.o. female on day 5 of admission presenting with Coronary artery disease involving native coronary artery of native heart    Subjective   Pt seen and examined, up in bedside chair.  Awake and alert this AM, able to recall events discussed with her yesterday  Feeling well, overall no complaints today.  No acute events overnight    Objective     Physical Exam  Vitals and nursing note reviewed.   Constitutional:       Appearance: She is obese. She is not ill-appearing.   HENT:      Head: Normocephalic.   Neck:      Comments: R CVC dressing intact, no drainage   Cardiovascular:      Rate and Rhythm: Regular rhythm. Tachycardia present.      Pulses: Normal pulses.      Heart sounds: No murmur heard.     No friction rub.      Comments: TELE: ST 100s  -MSI stable w/o click, well approximated w/o drainage or hematoma  -EP wires capped      Pulmonary:      Effort: Pulmonary effort is normal. No respiratory distress.      Breath sounds: No stridor. No wheezing (no audible wheezing).   Abdominal:      General: There is no distension.      Palpations: Abdomen is soft.      Comments: NG with TF at goal  + BM   Genitourinary:     Comments: Voiding post plasencia removal  Purwick  Musculoskeletal:         General: Swelling present.      Comments: -Left SVG site w/o drainage +bruising  -Right AC Radial harvest with some ss drainage +bruising +hematoma   - left brachial art line site w/o  drainage    Generalized bilateral LE edema   Skin:     General: Skin is warm and dry.      Capillary Refill: Capillary refill takes less than 2 seconds.      Findings: Bruising present.   Neurological:      Mental Status: She is alert.      Motor: Weakness present.      Comments: Right side 3/5  Left side 2/5  Alert to self, place, situation         Review of Systems   Constitutional:  Positive for activity change and fatigue. Negative for chills and fever.   HENT:  Positive for trouble swallowing.    Eyes: Negative.   "  Respiratory:  Positive for cough. Negative for shortness of breath, wheezing and stridor.    Cardiovascular:  Positive for leg swelling. Negative for chest pain and palpitations.   Gastrointestinal:  Negative for abdominal distention, abdominal pain, constipation, diarrhea, nausea and vomiting.   Endocrine: Negative for polydipsia, polyphagia and polyuria.   Genitourinary:  Negative for dysuria and hematuria.        Incontinence   Musculoskeletal: Negative.  Negative for arthralgias.   Skin:         Bruising to op sites and right arm   Allergic/Immunologic: Negative.    Neurological:  Positive for weakness. Negative for dizziness, speech difficulty and headaches.   Psychiatric/Behavioral: Negative.         Last Recorded Vitals   Blood pressure 101/60, pulse 81, temperature 36.8 °C (98.2 °F), temperature source Tympanic, resp. rate 17, height 1.689 m (5' 6.5\"), weight 101 kg (222 lb 10.6 oz), SpO2 98%.    Intake/Output Last 3 Shifts  I/O last 3 completed shifts:  In: 1303.3 (12.9 mL/kg) [I.V.:281.3 (2.8 mL/kg); NG/GT:665; IV Piggyback:357]  Out: 2860 (28.3 mL/kg) [Urine:1900 (0.5 mL/kg/hr); Emesis/NG output:960]  Weight: 101 kg            Lines  CVC 07/02/24 Double lumen Right Internal jugular (Active)   Placement Date/Time: 07/02/24 (c) 0858   Hand Hygiene Performed Prior to CVC Insertion: Yes  Site Prep: Chlorhexidine   Site Prep Agent has Completely Dried Before Insertion: Yes  All 5 Sterile Barriers Used (Gloves, Gown, Cap, Mask, Large Sterile Azul...   Number of days: 4       NG/OG/Feeding Tube NG - [place] sump 16 Fr Right nostril (Active)   Placement Date/Time: 07/04/24 1200   Earliest Known Present: 07/04/24  Hand Hygiene Completed: Yes  Type of Tube: NG/OG Tube  Tube Length: 65 cm  Tube Type: NG - [place] sump  NG/OG Tube Size: 16 Fr  Tube Location: Right nostril   Number of days: 2       External Urinary Catheter Female (Active)   Placement Date/Time: 07/05/24 2000   Hand Hygiene Completed: Yes  External " Catheter Type: Female  Removal Reason : Per protocol   Number of days: 1        Recent Labs  CMP:  Results from last 7 days   Lab Units 07/07/24  0920 07/06/24  0556 07/05/24  0500 07/04/24  0555 07/03/24  0523 07/02/24  1510   SODIUM mmol/L 139 138 137 142 141 143   POTASSIUM mmol/L 4.1 3.8 3.7 3.9 3.8 4.1   CHLORIDE mmol/L 104 104 104 108* 108* 108*   CO2 mmol/L 28 25 23 27 27 27   ANION GAP mmol/L 11 13 14 11 10 12   BUN mg/dL 24* 20 19 17 16 16   CREATININE mg/dL 0.72 0.62 0.75 0.78 0.80 0.99   EGFR mL/min/1.73m*2 90 >90 85 81 79 61   GLUCOSE mg/dL 230* 179* 196* 171* 212* 183*   MAGNESIUM mg/dL 2.10 2.10 1.80 2.10 2.20 3.10*   ALBUMIN g/dL 3.0* 2.7* 2.9* 3.1* 3.5 3.3*     CBC:  Results from last 7 days   Lab Units 07/07/24  0808 07/06/24  0556 07/05/24  0500 07/04/24  0555 07/03/24  0523 07/02/24  1510   WBC AUTO x10*3/uL 7.3 8.2 9.7 9.6 7.7 8.5   HEMOGLOBIN g/dL 8.5* 7.5* 8.2* 7.8* 9.0* 9.0*   HEMATOCRIT % 23.3* 21.8* 23.7* 22.0* 24.8* 25.1*   PLATELETS AUTO x10*3/uL 225 164 136* 135* 110* 91*   MCV fL 81 84 82 82 81 79*     COAG:   Results from last 7 days   Lab Units 07/02/24  1510   INR  1.2*     HEME/ENDO:     CARDIAC:        Imaging  XR chest 2 views   Final Result   Tiny left apical pneumothorax seen on prior study is no longer   evident. The left pleural effusion and left lower lobe atelectasis is   again noted with left pleural effusion appearing a little smaller.        Platelike atelectasis right mid lung zone with small right effusion.        Removal of the left chest tube and nasogastric tube.        Signed by: Lacy Boyd 7/7/2024 9:29 AM   Dictation workstation:   [id number]      XR chest 1 view   Final Result   Right-sided chest tube has been removed. No right-sided pneumothorax.        Interval placement of a NG tube with distal tip in the gastric   remnant.        Stable mediastinal drainage catheter, left chest tube, and right IJ   vein catheter. Stable tiny left apical pneumothorax.         Persistent left pleural effusion with associated left basilar   atelectasis/infiltrate.        Cardiomegaly with mild stable central vascular congestion.        MACRO:   None        Signed by: Bruno Gomez 7/5/2024 8:07 AM   Dictation workstation:   [id number]      XR abdomen 1 view   Final Result   Nasogastric tube with the distal tip projecting over the left   midabdomen.             MACRO:   None        Signed by: Angi Odonnell 7/4/2024 2:37 PM   Dictation workstation:   [id number]      XR abdomen 1 view   Final Result   NG tube has been placed with distal tip in the mid stomach.        Previous bariatric surgery.        Stable left pleural effusion with left basilar atelectasis/infiltrate.        Right chest tube has been removed. Additional tubes and lines in   place as described.        MACRO:   None        Signed by: Bruno Gomez 7/5/2024 8:05 AM   Dictation workstation:   [id number]      XR chest 1 view   Final Result   1. Linear line at the left lung apex may represent a subtle left   apical pneumothorax. Short-term follow-up with dedicated upright   chest radiograph series is recommended.   2. Left basilar opacity likely small effusion with atelectasis.   3. Mild prominence of the pulmonary interstitium which may relate to   mild pulmonary edema.        MACRO:   Rl Morfin discussed the significance and urgency of this   critical finding epic chat with  CAROL VILLANUEVA on 7/4/2024 at 10:19   am.  (**-F-**) Findings:  See findings.        Signed by: Rl Morfin 7/4/2024 10:19 AM   Dictation workstation:   [id number]      XR chest 1 view   Final Result   Interval removal of ET tube and NG tube. Additional support devices   remain in place. Subtle bibasilar opacities.        MACRO:   None.        Signed by: Meredith Pulido 7/3/2024 8:29 AM   Dictation workstation:   [id number]      CT head wo IV contrast   Final Result   1. No acute intracranial abnormality identified.   2. Chronic white matter  changes likely reflecting sequela of   small-vessel ischemic disease.   3. Chronic lacunar infarct in the right internal capsule/corona   radiata             If there is concern for superimposed acute ischemia or other   underlying abnormality then MRI may be performed in further   assessment.        MACRO:   None        Signed by: George Galdamez 7/3/2024 2:00 AM   Dictation workstation:   [id number]      XR chest 1 view   Final Result   Interval cardiothoracic surgery. Right-sided atelectasis.        MACRO:   none        Signed by: Mimi Garrido 7/2/2024 3:14 PM   Dictation workstation:   [id number]      Anesthesia Intraoperative Transesophageal Echocardiogram   Final Result           Medications  Scheduled medications  acetaminophen, 650 mg, oral, q6h  amLODIPine, 5 mg, oral, Daily  aspirin, 81 mg, oral, Daily  atorvastatin, 80 mg, oral, Nightly  enalapril, 20 mg, oral, BID  enoxaparin, 40 mg, subcutaneous, q24h  furosemide, 40 mg, intravenous, BID  insulin lispro, 0-15 Units, subcutaneous, q4h  isosorbide dinitrate, 20 mg, oral, TID  metoprolol tartrate, 50 mg, oral, BID  multivitamin with minerals, 1 tablet, nasogastric tube, Daily  polyethylene glycol, 17 g, oral, Daily  potassium chloride, 20 mEq, nasogastric tube, BID  sennosides-docusate sodium, 2 tablet, oral, BID      Continuous medications     PRN medications  PRN medications: dextrose **OR** glucagon, naloxone, [Held by provider] oxyCODONE, [Held by provider] oxyCODONE, oxygen, traMADol    Assessment/Plan   Principal Problem:    Coronary artery disease involving native coronary artery of native heart  Active Problems:    Coronary artery disease involving native coronary artery of native heart with unstable angina pectoris (Multi)    Ms Rubi 70 y/o female with past medical history significant for CAD, HTN, HLD, TYLER, GERD, CVA on Plavix,  NIDDM II, Anemia. Work up for chest pain revealed  Multivessel disease including in stent re-stenosis of the  circumflex and proximal LAD. She is admitted to ICU s/p Coronary Artery Bypass Graft x2 ; LIMA --> LAD, EVH RA --> RAMUS --> CX  EVH Right Radial Artery; EVH Right Saphenous Vein; CABG x 3 LIMA to LAD 26 ml/min, PI 3.6  Radial To Ramus, sequenced to Circ 26 ml/min,PI 1.6; Posterior Pericardial Window with Dr Kee on 7/2    Neuro  #H/O recent Right MCA CVA with residual Lt sided deficit (DAPT)  #Acute metabolic encephalopathy - suspect postoperative 2/2 CBP i/s/o recent large Right CVA: improved  #Expected acute postop pain: controlled  CT head negative for acute/subacute infarcts, redemonstration of prior chronic Rt MCA infarct  - Serial neuro and pain assessments   - Analgesia: scheduled Tylenol and PRN Ultram  - Hold analgesia   - PT/OT/MITT  - Delirium precautions, sleep hygiene              -> Sitting up in bed HOB > 40              -> Lights, TV/music/conversation on; blinds open              -> Avoid narcotics if possible              -> Sleep/wake cycle  - No need for MRI   - Neurology following appreciate recs    CV  #H/O HTN, HLD  #MVD with in-stent restenosis s/p CABGx2, PPW  LVEF 65% pre/post  AV wires capped  - Cont EKG, NIBP  - ASA/statin/BB  - Continue home Amlodipine enalapril and imdur  - Increase Metoprolol  - Hold home Plavix (CVA) for now    Pulm  #TYLER  Postop CXR negative for acute process  CXR 7/4 with small L apical PTX- resolved  CXR 7/6 sm right effusion  - Wean FiO2 maintaining SpO2 >92%  - Acapella, IS    GI  #Preserved renal function (baseline Cr ~0.9)  - RFP daily, replace lytes prn  - Lasix 40 mg IVP bid w/ scheduled potassium  - Strict I&Os  - Maintain K > 4, Mg > 2     / Renal  #GERD  - NPO; failed BSE given mental status  - Speech eval by SLP monday  - NG with TF  - Bowel regimen- senokot and miralax  - Zofran PRN  - Consult dietician    Endo   #NIDDM2  A1c 6.1  -  BG 200s  - POCT BG, ISS q4  -Change TF to Glucerna 1.5  - Hold home Metformin and Jardiance for now; plan to restart  metformin when passes swallow eval and taking PO     Heme  #Acute on chronic post op blood loss anemia (baseline Hgb ~9.0) and thrombocytopenia - stable  #Postop RUE hematoma surrounding radial harvest site  #7/2 POD 0 bedside exploration of RUE hematoma involving harvest site  - CBC daily  - DVT ppx: SCDs, Lovenox  - Neurovascular assessment of radial graft harvest q1  - Radial graft site care  -> Maintain gauze dressing to RUE exploration site              -> Notify ICU/CTS RAMSES if new or worsening firmness, bleeding, or loss of pulse ox or pulses              -> Elevation above heart if possible  - Wound care consulted    ID  Afebrile, no current indications of infection  MRSA negative  Periop abx  - Trend temp, WBCs    Dispo: transfer to SDU status     This critically ill patient continues to be at-risk for clinically significant deterioration / failure due to the above mentioned dysfunctional, unstable organ systems.  I have personally identified and managed all complex critical care issues with efforts to prevent aforementioned clinical deterioration.  Critical care time is spent at bedside and/or the immediate area and has included, but is not limited to, the review of diagnostic tests, labs, radiographs, serial assessments of hemodynamics, respiratory status, ventilatory management, and family updates.  Time spent in procedures and teaching are reported separately.    Critical Care time: 45    Jordan BARBA, [employer] MSN Student     I was present with the student who participated in the documentation of this note. I personally saw and evaluated the patient and performed my own history and examination. I discussed the case with the student. I have reviewed, verified, and revised the note as necessary and agree with the content and plan as written by the student.   Urvashi Castaneda, APRN-CNP

## 2024-07-07 NOTE — CARE PLAN
Problem: Pain - Adult  Goal: Verbalizes/displays adequate comfort level or baseline comfort level  Outcome: Progressing     Problem: Safety - Adult  Goal: Free from fall injury  Outcome: Progressing     Problem: Discharge Planning  Goal: Discharge to home or other facility with appropriate resources  Outcome: Progressing     Problem: Chronic Conditions and Co-morbidities  Goal: Patient's chronic conditions and co-morbidity symptoms are monitored and maintained or improved  Outcome: Progressing   The patient's goals for the shift include      The clinical goals for the shift include To be extubated

## 2024-07-07 NOTE — PROGRESS NOTES
Occupational Therapy    Evaluation    Patient Name: Rita Rubi  MRN: [id number]  Today's Date: 7/7/2024  Time Calculation  Start Time: 0825  Stop Time: 0900  Time Calculation (min): 37 min        Assessment:  OT Assessment: Pt presents with decrease endurance, strength, ROM, balance with increase fatigue and weakness, impeding ADL performance and functional mobility. Pt would benefit from skilled OT services to address these deficits and facilitate highest level of function.  Barriers to Discharge: Decreased caregiver support  Evaluation/Treatment Tolerance: Patient limited by fatigue, Patient limited by pain  Medical Staff Made Aware: Yes  End of Session Communication: Bedside nurse, Care Coordinator  End of Session Patient Position: Up in chair, Alarm on  OT Assessment Results: Decreased ADL status, Decreased upper extremity range of motion, Decreased upper extremity strength, Decreased endurance, Decreased fine motor control, Decreased functional mobility, Decreased IADLs, Decreased trunk control for functional activities  Barriers to Discharge: Decreased caregiver support  Evaluation/Treatment Tolerance: Patient limited by fatigue, Patient limited by pain  Medical Staff Made Aware: Yes  Strengths: Ability to acquire knowledge, Access to adaptive/assistive products, Premorbid level of function, Support of extended family/friends  Barriers to Participation: Comorbidities  Plan:  Treatment Interventions: ADL retraining, Functional transfer training, UE strengthening/ROM, Endurance training, Equipment evaluation/education, Neuromuscular reeducation, Fine motor coordination activities, Compensatory technique education  OT Frequency: 3 times per week  OT Discharge Recommendations: High intensity level of continued care  OT Recommended Transfer Status: Assist of 2  OT - OK to Discharge: Yes (Per POC)  Treatment Interventions: ADL retraining, Functional transfer training, UE strengthening/ROM, Endurance training,  "Equipment evaluation/education, Neuromuscular reeducation, Fine motor coordination activities, Compensatory technique education    Subjective        General:  General  Reason for Referral: 69 y.o. female s/p CABG x2 on 7/2 w/ increase weakness, decline in ADL performance and functional mobility.  Referred By: Urvashi Castaneda APRN-CNP  Past Medical History Relevant to Rehab:   Past Medical History:   Diagnosis Date    Allergic rhinitis     Anemia     Anticoagulated     Plavix    Arthritis     left knee    Coronary artery disease involving native coronary artery of native heart, unspecified whether angina present     GERD (gastroesophageal reflux disease)     under control    Hyperlipidemia     Hypertension     Ischemic stroke (Multi) 09/2023    Cerebral infarction due to thrombosis of right middle cerebral artery    TYLER (obstructive sleep apnea)     does not use CPAP (stopped after her bariatric surgery)    Stuttering     has become worse since her stroke    Type 2 diabetes mellitus (Multi)     1/26/2024 A1C 5.7%       Missed Visit: Yes  Missed Visit Reason: Patient placed on medical hold (therapy consult received, performed chart review. Per conversation with RN, pt with metabolic encephalopathy and continues to not be appropriate for PT/OT evaluations, requesting continued hold this date)  Family/Caregiver Present: No  Co-Treatment: PT  Co-Treatment Reason: To maximize pt safety, mobility and performance  Prior to Session Communication: Bedside nurse  Patient Position Received: Bed, 3 rail up, Alarm on  Preferred Learning Style: auditory, kinesthetic, verbal, visual  General Comment: Pt supine in bed upon arrival, agreeable and pleasant to OT/PT eval. Demo increase fatigue and weakness  Precautions:  Medical Precautions: Fall precautions, Cardiac precautions  Post-Surgical Precautions: Move in the Tube  Precautions Comment: NG tube, tele, IV. Hx of L affected CVA, reports R side is \"strong side\"  Vital " Signs:  Heart Rate: 105  Heart Rate Source: Monitor  SpO2: 96 %  BP: 121/62  MAP (mmHg): 77  BP Location: Left arm  BP Method: Automatic  Patient Position: Lying (Up in chair)  Pain:  Pain Assessment  Johnson-Millan FACES Pain Rating: Hurts little more (During mobility)  Pain Type: Surgical pain  Pain Location: Incision  Pain Orientation: Mid  Pain Interventions: Repositioned, Ambulation/increased activity  Response to Interventions: Able to participate in upright sitting with use of true rise chair as per therapy request    Objective   Cognition:  Orientation Level: Oriented X4 (Able to self-correct time (month))  Cognition Comments: Intermittent confusion; increase fatigue and lethargy.  Attention: Exceptions to WFL  Insight: Mild           Home Living:  Type of Home: House  Lives With: Alone (Brother, nieces and nephews next door)  Home Adaptive Equipment: Walker rolling or standard, Reacher, Long-handled shoehorn, Long-handled sponge  Home Layout: One level, Laundry in basement  Home Access: Stairs to enter with rails  Entrance Stairs-Rails: Both  Entrance Stairs-Number of Steps: 6  Bathroom Shower/Tub: Walk-in shower  Bathroom Toilet: Handicapped height  Bathroom Equipment: Grab bars in shower (Shower stool)  Prior Function:  Level of [place]: Independent with ADLs and functional transfers, Independent with homemaking with ambulation  Receives Help From: Family  ADL Assistance: Independent  Homemaking Assistance: Independent  Ambulatory Assistance: Independent  Prior Function Comments: + drive, report independent in all ADLs and iADLs.     ADL:  LE Dressing Assistance: Total  LE Dressing Deficit: Don/doff R sock, Don/doff L sock  Toileting Assistance with Device: Total (Purewick (removed))  Activity Tolerance:  Endurance: Tolerates less than 10 min exercise, no significant change in vital signs  Bed Mobility/Transfers: Bed Mobility  Bed Mobility: Yes  Bed Mobility 1  Bed Mobility 1: Scooting  Level of  Assistance 1: Dependent, +2  Bed Mobility Comments 1: Supine lateral scooting from bed to chair x2 assist with use of draw sheet, bed to true rise chair    Transfers  Transfer: No (Not safe at this time, increase fatigue, lethargy and weakness.)    Functional Mobility:  Functional Mobility  Functional Mobility Performed: No      Vision:Vision - Basic Assessment  Current Vision:  (Reports wears glasses)  Sensation:  Light Touch: No apparent deficits  Strength:  Strength Comments: RUE ~90 degrees and LUE ~70 degrees shoulder flexion. (Prior stroke affecting strength with LUE.)  Perception:  Inattention/Neglect:  (Unable to assess)  Initiation: Cues to initiate tasks  Coordination:  Upper Body Coordination: Limited strength and AROM of BLE  Lower Body Coordination: Limited mobility of BLE due to decreased strength  Coordination Comment: Unable to assess   Hand Function:  Gross Grasp: Impaired  Coordination:  (Unable to assess)  Extremities: RUE   RUE : Exceptions to WFL (Grossly greater than or equal 2/5) and LUE   LUE: Exceptions to WFL (Grossly greater than or equal 2/5)    Outcome Measures:[facility] Daily Activity  Putting on and taking off regular lower body clothing: Total  Bathing (including washing, rinsing, drying): Total  Putting on and taking off regular upper body clothing: A lot  Toileting, which includes using toilet, bedpan or urinal: Total  Taking care of personal grooming such as brushing teeth: A lot  Eating Meals: Total  Daily Activity - Total Score: 8        Education Documentation  Handouts, taught by Juliette De La Paz OT at 7/7/2024  9:53 AM.  Learner: Patient  Readiness: Acceptance  Method: Explanation, Demonstration, Handout  Response: Verbalizes Understanding, Needs Reinforcement    Body Mechanics, taught by Juliette De La Paz OT at 7/7/2024  9:53 AM.  Learner: Patient  Readiness: Acceptance  Method: Explanation, Demonstration, Handout  Response: Verbalizes Understanding, Needs  Reinforcement    Precautions, taught by Juliette De La Paz OT at 7/7/2024  9:53 AM.  Learner: Patient  Readiness: Acceptance  Method: Explanation, Demonstration, Handout  Response: Verbalizes Understanding, Needs Reinforcement    ADL Training, taught by Juliette De La Paz OT at 7/7/2024  9:53 AM.  Learner: Patient  Readiness: Acceptance  Method: Explanation, Demonstration, Handout  Response: Verbalizes Understanding, Needs Reinforcement    Education Comments  No comments found.        OP EDUCATION:  Education  Education Comment: MITT precautions    Goals:  Encounter Problems       Encounter Problems (Active)       ADLs       Patient will perform UB and LB sponge bathing with moderate assist level of assistance.       Start:  07/07/24    Expected End:  07/21/24            Patient with complete upper body dressing with minimal assist  level of assistance donning and doffing all UE clothes with PRN adaptive equipment while supported sitting and edge of bed        Start:  07/07/24    Expected End:  07/21/24            Patient with complete lower body dressing with moderate assist level of assistance donning and doffing all LE clothes  with PRN adaptive equipment while edge of bed        Start:  07/07/24    Expected End:  07/21/24            Patient will feed self with moderate assist level of assistance and verbal cues, tactile cues, and visual cues using PRN adaptive equipment.       Start:  07/07/24    Expected End:  07/21/24            Patient will complete daily grooming tasks with minimal assist  level of assistance and PRN adaptive equipment while supported sitting and edge of bed .       Start:  07/07/24    Expected End:  07/21/24            Patient will complete toileting including hygiene clothing management/hygiene with moderate assist level of assistance and bedside commode.       Start:  07/07/24    Expected End:  07/21/24               BALANCE       Patient will tolerate static and dynamic sitting for >=5  minutes to moderate assist level of assistance with use of bed rails in order to improve functional activity tolerance for ADL tasks.       Start:  07/07/24    Expected End:  07/21/24               TRANSFERS       Patient will perform bed mobility moderate assist level of assistance and bed rails in order to improve safety and independence with mobility       Start:  07/07/24    Expected End:  07/21/24            Patient will complete sit to stand transfer with moderate assist level of assistance and least restrictive device in order to improve safety and prepare for out of bed mobility.       Start:  07/07/24    Expected End:  07/21/24

## 2024-07-07 NOTE — PROGRESS NOTES
"Rita Rubi is a 69 y.o. female on day 5 of admission presenting with Coronary artery disease involving native coronary artery of native heart.      Subjective   She is much better today back to baseline before surgery.       Objective     Last Recorded Vitals  Blood pressure 120/57, pulse 98, temperature 36.8 °C (98.2 °F), temperature source Tympanic, resp. rate 17, height 1.689 m (5' 6.5\"), weight 101 kg (222 lb 10.6 oz), SpO2 98%.      Neurological Exam  Alert oriented x 4, extraocular full, saccades normal, VOR normal.  Facial sensation intact.  Face symmetric.  No abnormal involuntary face movement but no dysarthria noted.  Power intact in both upper and lower extremities on the right side left side power is approximately 5- out of 5 and upper extremity proximally and distally and 4+ to 5- out of 5 and lower extremity on the left side.  She says that this is her baseline prior to surgery.  No aphasia anymore.  Sensations on both sides proximally and distally.  No abnormal involuntary movements noted.  Relevant Results  Scheduled medications  acetaminophen, 650 mg, oral, q6h  amLODIPine, 5 mg, oral, Daily  aspirin, 81 mg, oral, Daily  atorvastatin, 80 mg, oral, Nightly  enalapril, 20 mg, oral, BID  enoxaparin, 40 mg, subcutaneous, q24h  furosemide, 40 mg, intravenous, BID  insulin lispro, 0-15 Units, subcutaneous, q4h  isosorbide dinitrate, 20 mg, oral, TID  metoprolol tartrate, 50 mg, oral, BID  multivitamin with minerals, 1 tablet, nasogastric tube, Daily  polyethylene glycol, 17 g, oral, Daily  potassium chloride, 20 mEq, nasogastric tube, BID  sennosides-docusate sodium, 2 tablet, oral, BID      Continuous medications     PRN medications  PRN medications: dextrose **OR** glucagon, naloxone, [Held by provider] oxyCODONE, [Held by provider] oxyCODONE, oxygen, traMADol  Results for orders placed or performed during the hospital encounter of 07/02/24 (from the past 24 hour(s))   POCT GLUCOSE   Result Value Ref " Range    POCT Glucose 198 (H) 74 - 99 mg/dL   POCT GLUCOSE   Result Value Ref Range    POCT Glucose 237 (H) 74 - 99 mg/dL   POCT GLUCOSE   Result Value Ref Range    POCT Glucose 214 (H) 74 - 99 mg/dL   CBC   Result Value Ref Range    WBC 7.3 4.4 - 11.3 x10*3/uL    nRBC 0.0 0.0 - 0.0 /100 WBCs    RBC 2.87 (L) 4.00 - 5.20 x10*6/uL    Hemoglobin 8.5 (L) 12.0 - 16.0 g/dL    Hematocrit 23.3 (L) 36.0 - 46.0 %    MCV 81 80 - 100 fL    MCH 29.6 26.0 - 34.0 pg    MCHC 36.5 (H) 32.0 - 36.0 g/dL    RDW 15.9 (H) 11.5 - 14.5 %    Platelets 225 150 - 450 x10*3/uL   POCT GLUCOSE   Result Value Ref Range    POCT Glucose 242 (H) 74 - 99 mg/dL   Renal Function Panel   Result Value Ref Range    Glucose 230 (H) 74 - 99 mg/dL    Sodium 139 136 - 145 mmol/L    Potassium 4.1 3.5 - 5.3 mmol/L    Chloride 104 98 - 107 mmol/L    Bicarbonate 28 21 - 32 mmol/L    Anion Gap 11 10 - 20 mmol/L    Urea Nitrogen 24 (H) 6 - 23 mg/dL    Creatinine 0.72 0.50 - 1.05 mg/dL    eGFR 90 >60 mL/min/1.73m*2    Calcium 9.2 8.6 - 10.3 mg/dL    Phosphorus 2.6 2.5 - 4.9 mg/dL    Albumin 3.0 (L) 3.4 - 5.0 g/dL   Magnesium   Result Value Ref Range    Magnesium 2.10 1.60 - 2.40 mg/dL   POCT GLUCOSE   Result Value Ref Range    POCT Glucose 238 (H) 74 - 99 mg/dL   POCT GLUCOSE   Result Value Ref Range    POCT Glucose 164 (H) 74 - 99 mg/dL     XR chest 2 views    Result Date: 7/7/2024  Interpreted By:  Lacy Boyd, STUDY: XR CHEST 2 VIEWS 7/6/2024 2:23 pm   INDICATION: Status post cardiac surgery   COMPARISON: 07/05/2024   ACCESSION NUMBER(S): [id number]   ORDERING CLINICIAN: FLOYD CUNNINGHAM   TECHNIQUE: AP erect and lateral views of the chest   FINDINGS: Nasogastric tube descends below diaphragm with right jugular CVP line terminating in SVC. The left chest tube has been removed. Sternal wires and mediastinal clips are present following CABG.   There is persistent left lower lobe atelectasis with a moderately small left pleural effusion. The increased density within  left lower hemithorax shows some improvement compared with the study done 1 day earlier. Currently, no pneumothorax is evident. There is platelike atelectasis in the right mid lung zone with small right pleural effusion. Mild pulmonary vascular prominence is seen.       Tiny left apical pneumothorax seen on prior study is no longer evident. The left pleural effusion and left lower lobe atelectasis is again noted with left pleural effusion appearing a little smaller.   Platelike atelectasis right mid lung zone with small right effusion.   Removal of the left chest tube and nasogastric tube.   Signed by: Lacy Boyd 7/7/2024 9:29 AM Dictation workstation:   [id number]    XR chest 1 view    Result Date: 7/5/2024  Interpreted By:  Bruno Gomez, STUDY: XR CHEST 1 VIEW;  7/5/2024 5:11 am   INDICATION: Signs/Symptoms:Re-evaluation of pneumothorax.   COMPARISON: Most recent prior chest x-ray is from 07/04/2024.   ACCESSION NUMBER(S): [id number]   ORDERING CLINICIAN: FLOYD CUNNINGHAM   TECHNIQUE: Single AP portable view of the chest was obtained.   FINDINGS: MEDIASTINUM/ LUNGS/ LEXA: Recent heart surgery. Mild cardiomegaly. Central vasculature remains mildly prominent. No peripheral interstitial thickening. There is persistent pleural and parenchymal opacity at the left base, unchanged. Right-sided chest tube has been removed. Stable left-sided chest tube. Interval placement of a nasogastric tube with distal tip in the gastric remnant. Distal tip of the right IJ vein catheter is in the distal superior vena cava. Tiny stable pneumothorax on the left in the left lung apex. No pneumothorax evident on the right. No tracheal deviation. No abnormal hilar fullness or gross mass on either side.   BONES: No lytic or blastic destructive bone lesion.   UPPER ABDOMEN: Surgical changes related to previous bariatric surgery..       Right-sided chest tube has been removed. No right-sided pneumothorax.   Interval placement of a NG tube with  distal tip in the gastric remnant.   Stable mediastinal drainage catheter, left chest tube, and right IJ vein catheter. Stable tiny left apical pneumothorax.   Persistent left pleural effusion with associated left basilar atelectasis/infiltrate.   Cardiomegaly with mild stable central vascular congestion.   MACRO: None   Signed by: Bruno Gomez 7/5/2024 8:07 AM Dictation workstation:   [id number]    XR abdomen 1 view    Result Date: 7/5/2024  Interpreted By:  Bruno Gomez, STUDY: XR ABDOMEN 1 VIEW;  7/4/2024 1:19 pm   INDICATION: Signs/Symptoms:NG placement.   COMPARISON: Chest x-ray from 07/04/2024   ACCESSION NUMBER(S): [id number]   ORDERING CLINICIAN: FLOYD CUNNINGHAM   TECHNIQUE:     Supine view of the lower chest and upper abdomen was obtained.   FINDINGS: Status post recent CABG. Distal tip of the right IJ vein catheter is in the distal superior vena cava. Stable mediastinal drainage catheter. Stable left-sided chest tube. Right-sided chest tube has been removed. There is persistent pleural and parenchymal opacity at the left base with obscuration of the left diaphragm. There is an NG tube now in place with distal tip in the mid stomach. Multiple stable medial left upper quadrant abdominal surgical clips. EKG wires overlie the chest. Stable arthritic changes in the spine.       NG tube has been placed with distal tip in the mid stomach.   Previous bariatric surgery.   Stable left pleural effusion with left basilar atelectasis/infiltrate.   Right chest tube has been removed. Additional tubes and lines in place as described.   MACRO: None   Signed by: Bruno Gomez 7/5/2024 8:05 AM Dictation workstation:   [id number]    XR abdomen 1 view    Result Date: 7/4/2024  Interpreted By:  Angi Odonnell, STUDY: XR ABDOMEN 1 VIEW;  7/4/2024 2:22 pm   INDICATION: Signs/Symptoms:NG repositioning.   COMPARISON: None.   ACCESSION NUMBER(S): [id number]   ORDERING CLINICIAN: CAROL VILLANUEVA   FINDINGS: Left-sided chest tube is  noted. Sternotomy wires are seen. Nasogastric tube is noted with the distal tip projecting over the left midabdomen.   The heart appears to be enlarged. Increased density seen in the left lung base.       Nasogastric tube with the distal tip projecting over the left midabdomen.     MACRO: None   Signed by: Angi Odonnell 7/4/2024 2:37 PM Dictation workstation:   [id number]    XR chest 1 view    Result Date: 7/4/2024  Interpreted By:  Rl Morfin, STUDY: Chest dated  7/4/2024.   INDICATION: Signs/Symptoms:sob   COMPARISON: Chest dated 07/03/2024.   ACCESSION NUMBER(S): [id number]   ORDERING CLINICIAN: CAROL VILLANUEVA   TECHNIQUE: One view of the chest.   FINDINGS: There are bilateral chest tubes mediastinal drain and right IJ sheath with the tip projecting over the mid to lower SVC. Surgical changes are seen of the mediastinum. There is a subtle linear line near the left lung apex. There is left basilar opacity with blunting of the costophrenic angle. There is mild prominence of the pulmonary interstitium. Cardiomediastinal silhouette is prominent but similar to the prior exam.       1. Linear line at the left lung apex may represent a subtle left apical pneumothorax. Short-term follow-up with dedicated upright chest radiograph series is recommended. 2. Left basilar opacity likely small effusion with atelectasis. 3. Mild prominence of the pulmonary interstitium which may relate to mild pulmonary edema.   MACRO: Rl Morfin discussed the significance and urgency of this critical finding epic chat with  CAROL VILLANUEVA on 7/4/2024 at 10:19 am.  (**-RCF-**) Findings:  See findings.   Signed by: Rl Morfin 7/4/2024 10:19 AM Dictation workstation:   [id number]    ECG 12 Lead    Result Date: 7/3/2024  Normal sinus rhythm Normal ECG Confirmed by Kevin Love ([id number]) on 7/3/2024 9:18:20 AM    XR chest 1 view    Result Date: 7/3/2024  Interpreted By:  Meredith Pulido, STUDY: XR CHEST 1 VIEW;  7/3/2024 5:07 am    INDICATION: Signs/Symptoms:Post op cardiac surgery.   COMPARISON: 07/02/2024   ACCESSION NUMBER(S): [id number]   ORDERING CLINICIAN: ANGELIC SINGH   FINDINGS: Interval removal of ET tube and NG tube. Right jugular central line, bilateral chest tubes and mediastinal drain remain in place. Additional presumed overlying monitoring/support devices again seen.   Minimal left-greater-than-right basilar opacities again seen. No pleural effusion or pneumothorax. Cardiac silhouette within normal limits for size status post sternotomy.       Interval removal of ET tube and NG tube. Additional support devices remain in place. Subtle bibasilar opacities.   MACRO: None.   Signed by: Meredith Pulido 7/3/2024 8:29 AM Dictation workstation:   [id number]    CT head wo IV contrast    Result Date: 7/3/2024  Interpreted By:  George Galdamez, STUDY: CT HEAD WO IV CONTRAST;  7/3/2024 1:46 am   INDICATION: Signs/Symptoms:AMS.   COMPARISON: CT head 09/12/2023   ACCESSION NUMBER(S): [id number]   ORDERING CLINICIAN: RADU MCCORMICK   TECHNIQUE: Noncontrast axial CT images of head were obtained with coronal and sagittal reconstructed images.   FINDINGS: BRAIN PARENCHYMA: Chronic lacunar infarct along the right internal capsule extending into the corona radiata. No acute large territory infarction or transcortical edema evident by CT. No mass-effect, midline shift or effacement of cerebral sulci. Patchy periventricular and subcortical white matter hypodensities, nonspecific but often seen in the setting of chronic microangiopathic change.   HEMORRHAGE: No acute intracranial hemorrhage.   VENTRICLES and EXTRA-AXIAL SPACES: The ventricles and sulci are within normal limits for brain volume. No abnormal extra-axial fluid collection.   ORBITS: The visualized orbits and globes are within normal limits.   EXTRACRANIAL SOFT TISSUES: Within normal limits.   PARANASAL SINUSES/MASTOIDS: The visualized paranasal sinuses and mastoid air cells are well  aerated.   CALVARIUM: No depressed skull fracture.         1. No acute intracranial abnormality identified. 2. Chronic white matter changes likely reflecting sequela of small-vessel ischemic disease. 3. Chronic lacunar infarct in the right internal capsule/corona radiata     If there is concern for superimposed acute ischemia or other underlying abnormality then MRI may be performed in further assessment.   MACRO: None   Signed by: George Galdamez 7/3/2024 2:00 AM Dictation workstation:   [id number]    XR chest 1 view    Result Date: 7/2/2024  Interpreted By:  Mimi Garrido, STUDY: XR CHEST 1 VIEW;  7/2/2024 3:02 pm   INDICATION: Signs/Symptoms:Post op cardiac surgery.   COMPARISON: 06/24/2024   ACCESSION NUMBER(S): [id number]   ORDERING CLINICIAN: ANGELIC SINGH   FINDINGS: The heart is normal size.   There are bilateral chest tubes. There is right-sided atelectasis.   An endotracheal tube terminates above the salome.   A nasogastric tube terminates below the diaphragm.   A venous catheter is present on the right. The tip is in the region of superior vena cava.   Sternal wires and mediastinal clips are present. There are surgical clips in the left upper abdomen.   COMPARISON OF FINDING: Interval surgery.       Interval cardiothoracic surgery. Right-sided atelectasis.   MACRO: none   Signed by: Mimi Garrido 7/2/2024 3:14 PM Dictation workstation:   [id number]    Anesthesia Intraoperative Transesophageal Echocardiogram    Result Date: 7/2/2024  [facility] - Dept of Anesthesiology   [address] [place] [address]     Tel [phone] and Fax [phone] TRANSESOPHAGEAL ECHOCARDIOGRAM REPORT  Patient Name:      OSKAR Holm Physician:   Janie Berger MD Study Date:        7/2/2024            Ordering Provider:   Janie BERGER MRN/PID:           [id number]            Fellow: Accession#:        [id number]        Nurse: Date of Birth/Age: 1955 / 69      Sonographer:                     years Gender:            F                   Additional Staff:    Franci Kee MD BSA / BMI:         m2 / kg/m2          Encounter#:          [id number] Blood Pressure:    /                   Department Location: [facility] OR Surgery Study Type:    ANESTHESIA INTRAOPERATIVE FELICIANO Diagnosis/ICD: Atherosclerotic heart disease of native coronary artery with                unstable angina pectoris-I25.110 Indication:    Angina, Unstable CPT Code:      FELICIANO Complete-12139; Doppler Limited-35811; Color Doppler-90799 PHYSICIAN INTERPRETATION: FELICIANO Details: Technically adequate omniplane transesophageal echocardiogram performed. Left Ventricle: Left ventricular ejection fraction is normal, by visual estimate at 65%. There are no regional wall motion abnormalities. The left ventricular cavity size is decreased. The left ventricular septal wall thickness is mildly increased. There is mildly increased left ventricular posterior wall thickness. There is mild concentric left ventricular hypertrophy. Left ventricular diastolic filling was not assessed. There are no regional wall motion abnormalities and the LV appears underfilled. Left Atrium: The left atrium is mildly dilated. There is no evidence of a patent foramen ovale. There is no thrombus visualized in the left atrial appendage. The LA measures 48 mm in the minor axis consistent with mild dilation. Right Ventricle: The right ventricle is normal in size. There is normal right ventricular global systolic function. Right Atrium: The right atrium is mildly dilated. The RA measures 47 mm in the minor axis consistent with mild RA chamber enlargement. Aortic Valve: The aortic valve appears abnormal. There is evidence of mild aortic valve stenosis. There is mild aortic valve regurgitation. The left coronary cusp is severely restricted although there does not appear to be a severe amount of calcium on this cusp. There is mild aortic valve stenosis with peak and mean  gradients of 20 and 9 mmHg, respectively. There is mild aortic insufficiency with a vena contracta width of 0.27 cm. The aortic valve pressure half time is 741 msec consistent with mild AI. Mitral Valve: The mitral valve is normal in structure. There is trace mitral valve regurgitation which is centrally directed. Tricuspid Valve: The tricuspid valve is structurally normal. There is no evidence of tricuspid valve stenosis. There is trace tricuspid regurgitation. The tricuspid valve regurgitant jet is centrally directed. Pulmonic Valve: The pulmonic valve is structurally normal. There is no indication of pulmonic valve regurgitation. Pericardium: There is no pericardial effusion noted. Aorta: The aortic root is abnormal. There is no plaque visualized in the ascending aorta. There is plaque visualized in the transverse aorta, which is classified as a Grade 3 [moderate atheroma >3-5 mm (no mobile/ulcerated component)] atherosclerosis. There is plaque visualized in the descending aorta which is classified as a Grade 2 [mild (focal or diffuse) intimal thickening of 2-3 mm] atherosclerosis. The left coronary cusp of the aortic valve is significantly restricted. Pulmonary Veins: The left upper pulmonary vein is normal. In comparison to the previous echocardiogram(s): Not performed on intraoperative study.  CONCLUSIONS:  1. Left ventricular ejection fraction is normal, by visual estimate at 65%.  2. Left ventricular cavity size is decreased.  3. There is normal right ventricular global systolic function.  4. The left atrium is mildly dilated.  5. There is No tricuspid stenosis.  6. Aortic valve appears abnormal.  7. Mild aortic valve stenosis.  8. Mild aortic valve regurgitation.  9. There is no evidence of a patent foramen ovale. 10. There is plaque visualized in the descending aorta. 11. There is plaque visualized in the transverse aorta. POST CARDIOPULMONARY BYPASS REPORT: Patient is being paced. The patient is on  norepinephrine 0.02 mcg/kg/min. Global LV function is normal. The LV appears underfilled. Global RV function is normal. There is mild aortic regurgitation. There is a central jet of AI with peak and mean gradients of 20 and 7 mmHg respectively. There is trace mitral regurgitation. There is mild tricuspid regurgitation. No evidence of post aortic cannulation dissection. The post-CPB images were discussed with Dr. DORY Kee.  QUANTITATIVE DATA SUMMARY: 2D MEASUREMENTS:                Normal Ranges: IVSd:  0.99 cm (0.6-1.1cm) LVPWd: 1.14 cm (0.6-1.1cm) LVIDd: 3.83 cm (3.9-5.9cm) AORTA MEASUREMENTS:                      Normal Ranges: AoV Nichelle,s:   2.04 cm (1.4-2.6cm) Ao Sinus, s: 2.98 cm Ao STJ, s:   2.61 cm LV SYSTOLIC FUNCTION BY 2D PLANIMETRY (MOD):                      Normal Ranges: EF-Visual:      65 % LV EF Reported: 65 % AORTIC VALVE:                            Normal Ranges: LVOT Diameter:    1.88 cm  (1.8-2.4cm) AoV Area, planim: 1.81 cm2 (2.5-4.5cm2)  RIGHT VENTRICLE: TAPSE: 18.4 mm  [id number] Willem Schwarz MD Electronically signed on 7/2/2024 at 2:20:36 PM  ** Final **     XR chest 2 views    Result Date: 6/26/2024  Interpreted By:  Chelle Dean, STUDY: XR CHEST 2 VIEWS; ;  6/24/2024 12:35 pm   INDICATION: Signs/Symptoms:pre-op.   COMPARISON: None.   ACCESSION NUMBER(S): [id number]   ORDERING CLINICIAN: SHAINA KEE   FINDINGS: Two views of the chest were performed.   Cardiac silhouette is normal in size and morphology. Bilateral lungs are clear without airspace opacities. No large pleural effusions or pneumothorax. No acute osseous findings.       No acute cardiopulmonary process.     MACRO: None   Signed by: Chelle Dean 6/26/2024 11:30 AM Dictation workstation:   [id number]    Vascular US carotid artery duplex bilateral    Result Date: 6/25/2024             [place] [address]   Tel [phone] and Fax [phone]  Vascular Lab Report VASC US CAROTID ARTERY  DUPLEX BILATERAL  Patient Name:      OSKAR SYL Holm Physician: [id number] Mary Rodríguez MD Study Date:        6/24/2024           Ordering           [id number] SHAINA SANFORD                                        Physician: MRN/PID:           [id number]            Technologist:      Holly Arango [facility] Accession#:        [id number]        Technologist 2: Date of Birth/Age: 1955 / 69      Encounter#:        [id number]                    years Gender:            F Admission Status:  Outpatient          Location           [facility] [place]                                        Performed:  Diagnosis/ICD: Occlusion and stenosis of bilateral carotid arteries-I65.23 CPT Codes:     12635 Cerebrovascular Carotid Duplex scan complete  CONCLUSIONS: Right Carotid: Findings are consistent with less than 50% stenosis of the right proximal internal carotid artery. The right proximal internal carotid artery is normal. Right external carotid artery appears patent with no evidence of stenosis. No evidence of hemodynamically significant stenosis of the right common carotid artery. The right vertebral artery is patent with antegrade flow. No evidence of hemodynamically significant stenosis in the right subclavian artery. Left Carotid: Findings are consistent with less than 50% stenosis of the left proximal internal carotid artery. The left proximal internal carotid artery is normal. Left external carotid artery appears patent with no evidence of stenosis. No evidence of hemodynamically significant stenosis of the left common carotid artery. The left vertebral artery is patent with antegrade flow. No evidence of hemodynamically significant stenosis in the left subclavian artery.  Imaging & Doppler Findings: Right Plaque Morph: The proximal right internal carotid artery demonstrates heterogenous plaque. The proximal right external carotid artery demonstrates heterogenous  plaque. The distal right common carotid artery demonstrates heterogenous plaque. The right carotid bulb demonstrates heterogenous plaque. Left Plaque Morph: The proximal left internal carotid artery demonstrates heterogenous plaque. The proximal left external carotid artery demonstrates heterogenous plaque. The distal left common carotid artery demonstrates heterogenous plaque. The left carotid bulb demonstrates heterogenous plaque.   Right                        Left   PSV      EDV                PSV      EDV 64 cm/s            CCA P    75 cm/s 52 cm/s            CCA D    53 cm/s 47 cm/s  18 cm/s   ICA P    75 cm/s  26 cm/s 70 cm/s  23 cm/s   ICA D    62 cm/s  20 cm/s 70 cm/s             ECA     40 cm/s 42 cm/s  9 cm/s  Vertebral  55 cm/s  12 cm/s 101 cm/s         Subclavian 135 cm/s  Right                       Left   PSV    Waveform            PSV  Waveform 133 cm/s          Innominate               Right Left ICA/CCA Ratio  0.9  1.4   [id number] Mary Rodríguez MD Electronically signed by [id number] Mary Rodríguez MD on 6/25/2024 at 5:20:16 PM  ** Final **     CT chest abdomen pelvis wo IV contrast    Result Date: 6/25/2024  Interpreted By:  Vivek Magdaleno, STUDY: CT CHEST ABDOMEN PELVIS WO CONTRAST; ;  6/24/2024 1:06 pm   INDICATION: Signs/Symptoms:pre-op.   COMPARISON: None.   ACCESSION NUMBER(S): [id number]   ORDERING CLINICIAN: SHAINA SANFORD   TECHNIQUE: Serial axial unenhanced CT images obtained of the chest, abdomen and pelvis. Images reformatted in the coronal and sagittal projection   All CT examinations are performed with 1 or more of the following dose reduction techniques: Automated exposure control, adjustment of mA and/or kv according to patient's size, or use of iterative reconstruction techniques.   FINDINGS: CT chest:   Mediastinum demonstrates no lymphadenopathy. There is no hilar lymphadenopathy. Esophagus is unremarkable   Heart and great vessels demonstrate ascending thoracic aorta to measure 3.4 cm.  There is mild vascular calcification at the level of the aortic valve plane. Characterization of the left coronary sinus demonstrates minimal vascular calcification. Subtle calcification at the sino-tubular junction. The ascending thoracic aorta demonstrates no vascular calcification. No significant calcified plaques. Main pulmonary artery is unremarkable.   Lung parenchyma demonstrates no infiltrate or effusion.   Visualized osseous structures demonstrate mild multilevel anterior osteophyte formation mid to lower thoracic spine. No compression deformity demonstrated.   CT abdomen:   Liver is unremarkable within limits of this unenhanced CT   Gallbladder is unremarkable   Spleen and adrenal glands are unremarkable   Pancreas is unremarkable within limits of this unenhanced CT   Right kidney is unremarkable   Left kidney is unremarkable   Retroperitoneum demonstrates no lymphadenopathy. Mild vascular calcification of the abdominal aorta.   Loops of large bowel demonstrate uncomplicated colonic diverticulosis in particular within the sigmoid colon. Small bowel loops are nondilated. Stomach demonstrates postsurgical changes status post gastric sleeve procedure.   CT pelvis:   Unopacified bladder is unremarkable. There is no pelvic lymphadenopathy. No free fluid demonstrated.   Visualized osseous structures demonstrate mild facet arthropathy lower lumbar spine. Mild loss disc space height at L4/5. Moderate facet arthropathy demonstrated L4/5 and L5/S1.           1. Mild vascular calcification of the aortic valve plane and coronary sinus. However, ascending thoracic aorta demonstrates no calcified plaques.   2. No infiltrate in the chest   3. Postsurgical changes status post gastric sleeve procedure   4. Uncomplicated colonic diverticulosis in particular of the sigmoid colon     MACRO: None   Signed by: Vivek Magdaleno 6/25/2024 4:46 PM Dictation workstation:   [id number]    ECG 12 lead (Clinic Performed)    Result  Date: 6/25/2024   Poor data quality, interpretation may be adversely affected Normal sinus rhythm Nonspecific T wave abnormality Abnormal ECG When compared with ECG of 12-SEP-2023 18:38, Previous ECG has undetermined rhythm, needs review Confirmed by Too Olsen ([id number]) on 6/25/2024 11:53:53 AM     [place] Coma Scale  Best Eye Response: Spontaneous  Best Verbal Response: Oriented  Best Motor Response: Follows commands  [place] Coma Scale Score: 15          Assessment/Plan      Principal Problem:    Coronary artery disease involving native coronary artery of native heart  Active Problems:    Coronary artery disease involving native coronary artery of native heart with unstable angina pectoris (Multi)    This is a 69-year-old woman status post CABG neurology was consulted for the concern of aphasia/perseverance.  However I had not noticed a aphasia or perseverance at all, it had recovered by the time I saw her.  There was some weakness on the left side upper and lower extremity which was somewhat worse compared to her preoperative baseline when I first saw her however this has significantly improved today.  Now she is at her baseline.  To this and this could be likely a recrudescence as initially suspected.  No additional intervention from the neurological standpoint if she does get any worse then would consider further for now neurology will sign off please call us if you have any questions.         I spent 40 minutes in the professional and overall care of this patient.      Aasef G Shaikh, MD PhD

## 2024-07-08 ENCOUNTER — DOCUMENTATION (OUTPATIENT)
Dept: RESEARCH | Age: 69
End: 2024-07-08
Payer: COMMERCIAL

## 2024-07-08 LAB
ABO GROUP (TYPE) IN BLOOD: NORMAL
ALBUMIN SERPL BCP-MCNC: 2.9 G/DL (ref 3.4–5)
ANION GAP SERPL CALC-SCNC: 11 MMOL/L (ref 10–20)
ANTIBODY SCREEN: NORMAL
BLOOD EXPIRATION DATE: NORMAL
BUN SERPL-MCNC: 27 MG/DL (ref 6–23)
CALCIUM SERPL-MCNC: 8.8 MG/DL (ref 8.6–10.3)
CHLORIDE SERPL-SCNC: 103 MMOL/L (ref 98–107)
CO2 SERPL-SCNC: 29 MMOL/L (ref 21–32)
CREAT SERPL-MCNC: 0.74 MG/DL (ref 0.5–1.05)
DISPENSE STATUS: NORMAL
EGFRCR SERPLBLD CKD-EPI 2021: 87 ML/MIN/1.73M*2
ERYTHROCYTE [DISTWIDTH] IN BLOOD BY AUTOMATED COUNT: 16.5 % (ref 11.5–14.5)
GLUCOSE BLD MANUAL STRIP-MCNC: 165 MG/DL (ref 74–99)
GLUCOSE BLD MANUAL STRIP-MCNC: 176 MG/DL (ref 74–99)
GLUCOSE BLD MANUAL STRIP-MCNC: 182 MG/DL (ref 74–99)
GLUCOSE BLD MANUAL STRIP-MCNC: 201 MG/DL (ref 74–99)
GLUCOSE BLD MANUAL STRIP-MCNC: 204 MG/DL (ref 74–99)
GLUCOSE BLD MANUAL STRIP-MCNC: 247 MG/DL (ref 74–99)
GLUCOSE BLD MANUAL STRIP-MCNC: 303 MG/DL (ref 74–99)
GLUCOSE SERPL-MCNC: 200 MG/DL (ref 74–99)
HCT VFR BLD AUTO: 22.5 % (ref 36–46)
HGB BLD-MCNC: 7.7 G/DL (ref 12–16)
MAGNESIUM SERPL-MCNC: 2 MG/DL (ref 1.6–2.4)
MCH RBC QN AUTO: 29.2 PG (ref 26–34)
MCHC RBC AUTO-ENTMCNC: 34.2 G/DL (ref 32–36)
MCV RBC AUTO: 85 FL (ref 80–100)
NRBC BLD-RTO: 0 /100 WBCS (ref 0–0)
PHOSPHATE SERPL-MCNC: 2.9 MG/DL (ref 2.5–4.9)
PLATELET # BLD AUTO: 234 X10*3/UL (ref 150–450)
POTASSIUM SERPL-SCNC: 4.1 MMOL/L (ref 3.5–5.3)
PRODUCT BLOOD TYPE: 600
PRODUCT CODE: NORMAL
RBC # BLD AUTO: 2.64 X10*6/UL (ref 4–5.2)
RH FACTOR (ANTIGEN D): NORMAL
SODIUM SERPL-SCNC: 139 MMOL/L (ref 136–145)
UNIT ABO: NORMAL
UNIT NUMBER: NORMAL
UNIT RH: NORMAL
UNIT VOLUME: 350
WBC # BLD AUTO: 7.7 X10*3/UL (ref 4.4–11.3)
XM INTEP: NORMAL

## 2024-07-08 PROCEDURE — 80069 RENAL FUNCTION PANEL: CPT | Performed by: NURSE PRACTITIONER

## 2024-07-08 PROCEDURE — 82947 ASSAY GLUCOSE BLOOD QUANT: CPT

## 2024-07-08 PROCEDURE — 2500000001 HC RX 250 WO HCPCS SELF ADMINISTERED DRUGS (ALT 637 FOR MEDICARE OP): Performed by: NURSE PRACTITIONER

## 2024-07-08 PROCEDURE — 36430 TRANSFUSION BLD/BLD COMPNT: CPT

## 2024-07-08 PROCEDURE — 97112 NEUROMUSCULAR REEDUCATION: CPT | Mod: GP

## 2024-07-08 PROCEDURE — P9016 RBC LEUKOCYTES REDUCED: HCPCS

## 2024-07-08 PROCEDURE — 97110 THERAPEUTIC EXERCISES: CPT | Mod: GP

## 2024-07-08 PROCEDURE — 36415 COLL VENOUS BLD VENIPUNCTURE: CPT | Performed by: NURSE PRACTITIONER

## 2024-07-08 PROCEDURE — 83735 ASSAY OF MAGNESIUM: CPT | Performed by: NURSE PRACTITIONER

## 2024-07-08 PROCEDURE — 86920 COMPATIBILITY TEST SPIN: CPT

## 2024-07-08 PROCEDURE — 2500000002 HC RX 250 W HCPCS SELF ADMINISTERED DRUGS (ALT 637 FOR MEDICARE OP, ALT 636 FOR OP/ED): Performed by: NURSE PRACTITIONER

## 2024-07-08 PROCEDURE — 99232 SBSQ HOSP IP/OBS MODERATE 35: CPT | Performed by: NURSE PRACTITIONER

## 2024-07-08 PROCEDURE — 2500000004 HC RX 250 GENERAL PHARMACY W/ HCPCS (ALT 636 FOR OP/ED): Performed by: NURSE PRACTITIONER

## 2024-07-08 PROCEDURE — 86900 BLOOD TYPING SEROLOGIC ABO: CPT | Performed by: NURSE PRACTITIONER

## 2024-07-08 PROCEDURE — 94660 CPAP INITIATION&MGMT: CPT

## 2024-07-08 PROCEDURE — 92526 ORAL FUNCTION THERAPY: CPT | Mod: GN

## 2024-07-08 PROCEDURE — 2500000001 HC RX 250 WO HCPCS SELF ADMINISTERED DRUGS (ALT 637 FOR MEDICARE OP)

## 2024-07-08 PROCEDURE — 9420000001 HC RT PATIENT EDUCATION 5 MIN

## 2024-07-08 PROCEDURE — 85027 COMPLETE CBC AUTOMATED: CPT | Performed by: NURSE PRACTITIONER

## 2024-07-08 PROCEDURE — 94668 MNPJ CHEST WALL SBSQ: CPT

## 2024-07-08 PROCEDURE — 92610 EVALUATE SWALLOWING FUNCTION: CPT | Mod: GN

## 2024-07-08 PROCEDURE — 97530 THERAPEUTIC ACTIVITIES: CPT | Mod: GO

## 2024-07-08 PROCEDURE — 2060000001 HC INTERMEDIATE ICU ROOM DAILY

## 2024-07-08 PROCEDURE — 97535 SELF CARE MNGMENT TRAINING: CPT | Mod: GO

## 2024-07-08 RX ORDER — POLYETHYLENE GLYCOL 3350 17 G/17G
17 POWDER, FOR SOLUTION ORAL DAILY PRN
Status: DISCONTINUED | OUTPATIENT
Start: 2024-07-08 | End: 2024-07-10 | Stop reason: HOSPADM

## 2024-07-08 RX ORDER — AMOXICILLIN 250 MG
2 CAPSULE ORAL NIGHTLY PRN
Status: DISCONTINUED | OUTPATIENT
Start: 2024-07-08 | End: 2024-07-10 | Stop reason: HOSPADM

## 2024-07-08 ASSESSMENT — PAIN DESCRIPTION - DESCRIPTORS: DESCRIPTORS: HEAVINESS;JABBING

## 2024-07-08 ASSESSMENT — ACTIVITIES OF DAILY LIVING (ADL): HOME_MANAGEMENT_TIME_ENTRY: 15

## 2024-07-08 ASSESSMENT — PAIN SCALES - GENERAL
PAINLEVEL_OUTOF10: 0 - NO PAIN

## 2024-07-08 ASSESSMENT — COGNITIVE AND FUNCTIONAL STATUS - GENERAL
MOVING TO AND FROM BED TO CHAIR: TOTAL
MOVING FROM LYING ON BACK TO SITTING ON SIDE OF FLAT BED WITH BEDRAILS: TOTAL
CLIMB 3 TO 5 STEPS WITH RAILING: TOTAL
WALKING IN HOSPITAL ROOM: TOTAL
TURNING FROM BACK TO SIDE WHILE IN FLAT BAD: TOTAL
TOILETING: TOTAL
DRESSING REGULAR UPPER BODY CLOTHING: A LOT
MOBILITY SCORE: 6
PERSONAL GROOMING: A LOT
EATING MEALS: A LOT
DAILY ACTIVITIY SCORE: 10
DRESSING REGULAR LOWER BODY CLOTHING: TOTAL
HELP NEEDED FOR BATHING: A LOT
STANDING UP FROM CHAIR USING ARMS: TOTAL

## 2024-07-08 ASSESSMENT — PAIN - FUNCTIONAL ASSESSMENT
PAIN_FUNCTIONAL_ASSESSMENT: WONG-BAKER FACES
PAIN_FUNCTIONAL_ASSESSMENT: 0-10

## 2024-07-08 ASSESSMENT — PAIN SCALES - WONG BAKER: WONGBAKER_NUMERICALRESPONSE: HURTS EVEN MORE

## 2024-07-08 NOTE — PROGRESS NOTES
Rita Rubi is a 69 y.o. female on day 6 of admission presenting with Coronary artery disease involving native coronary artery of native heart    Subjective   JARON  Sitting up in chair, reports poor night sleep, no energy  Remains globally weak    PT pulled/removed NGT   Speech evla per Speech therapy recommend thickened liquids and MBS for tomorrow.     Objective     Physical Exam  Vitals and nursing note reviewed.   Constitutional:       Appearance: She is obese. She is not ill-appearing.   HENT:      Head: Normocephalic.   Neck:      Comments: R CVC dressing intact, no drainage   Cardiovascular:      Rate and Rhythm: Regular rhythm. Tachycardia present.      Pulses: Normal pulses.      Heart sounds: No murmur heard.     No friction rub.      Comments: TELE: SR-St   -MSI stable w/o click, well approximated w/o drainage or hematoma  -EP wires capped  Pulmonary:      Effort: Pulmonary effort is normal. No respiratory distress.      Breath sounds: No stridor. No wheezing (no audible wheezing).   Abdominal:      General: There is no distension.      Palpations: Abdomen is soft.      Comments: NG with TF at goal  + BM   Genitourinary:     Comments: Voiding post plasencia removal  Purwick  Musculoskeletal:         General: Swelling present.      Comments: -Left SVG site w/o drainage +bruising  -Right AC Radial harvest with some ss drainage +bruising +hematoma   - left brachial art line site w/o  drainage  Generalized bilateral LE edema   Skin:     General: Skin is warm and dry.      Capillary Refill: Capillary refill takes less than 2 seconds.      Findings: Bruising present.   Neurological:      Mental Status: She is alert.      Motor: Weakness present.      Comments: Right side 3/5  Left side 2/5  Alert to self, place, situation  Review of Systems  Denies chest pain, palpations, light headedness, dizziness sob, n/v fever, chills    Last Recorded Vitals   Blood pressure 123/56, pulse 84, temperature 36.6 °C (97.9 °F),  "temperature source Temporal, resp. rate 18, height 1.689 m (5' 6.5\"), weight 101 kg (223 lb 12.3 oz), SpO2 99%.    Intake/Output Last 3 Shifts  I/O last 3 completed shifts:  In: 3238 (31.9 mL/kg) [NG/GT:3238]  Out: 1800 (17.7 mL/kg) [Urine:1800 (0.5 mL/kg/hr)]  Weight: 101.5 kg     Lines  NG/OG/Feeding Tube NG - [place] sump 16 Fr Right nostril (Active)   Placement Date/Time: 07/04/24 1200   Earliest Known Present: 07/04/24  Hand Hygiene Completed: Yes  Type of Tube: NG/OG Tube  Tube Length: 65 cm  Tube Type: NG - [place] sump  NG/OG Tube Size: 16 Fr  Tube Location: Right nostril   Number of days: 3       External Urinary Catheter Female (Active)   Placement Date/Time: 07/05/24 2000   Hand Hygiene Completed: Yes  External Catheter Type: Female  Removal Reason : Per protocol   Number of days: 2        Recent Labs  CMP:  Results from last 7 days   Lab Units 07/08/24  0512 07/07/24  0920 07/06/24  0556 07/05/24  0500 07/04/24  0555 07/03/24  0523 07/02/24  1510   SODIUM mmol/L 139 139 138 137 142 141 143   POTASSIUM mmol/L 4.1 4.1 3.8 3.7 3.9 3.8 4.1   CHLORIDE mmol/L 103 104 104 104 108* 108* 108*   CO2 mmol/L 29 28 25 23 27 27 27   ANION GAP mmol/L 11 11 13 14 11 10 12   BUN mg/dL 27* 24* 20 19 17 16 16   CREATININE mg/dL 0.74 0.72 0.62 0.75 0.78 0.80 0.99   EGFR mL/min/1.73m*2 87 90 >90 85 81 79 61   MAGNESIUM mg/dL 2.00 2.10 2.10 1.80 2.10 2.20 3.10*   ALBUMIN g/dL 2.9* 3.0* 2.7* 2.9* 3.1* 3.5 3.3*     CBC:  Results from last 7 days   Lab Units 07/08/24  0512 07/07/24  0808 07/06/24  0556 07/05/24  0500 07/04/24  0555 07/03/24  0523 07/02/24  1510   WBC AUTO x10*3/uL 7.7 7.3 8.2 9.7 9.6 7.7 8.5   HEMOGLOBIN g/dL 7.7* 8.5* 7.5* 8.2* 7.8* 9.0* 9.0*   HEMATOCRIT % 22.5* 23.3* 21.8* 23.7* 22.0* 24.8* 25.1*   PLATELETS AUTO x10*3/uL 234 225 164 136* 135* 110* 91*   MCV fL 85 81 84 82 82 81 79*     COAG:   Results from last 7 days   Lab Units 07/02/24  1510   INR  1.2*     HEME/ENDO:     CARDIAC:        Imaging  XR chest 2 " views   Final Result   Tiny left apical pneumothorax seen on prior study is no longer   evident. The left pleural effusion and left lower lobe atelectasis is   again noted with left pleural effusion appearing a little smaller.        Platelike atelectasis right mid lung zone with small right effusion.        Removal of the left chest tube and nasogastric tube.        Signed by: Lcay Boyd 7/7/2024 9:29 AM   Dictation workstation:   [id number]      XR chest 1 view   Final Result   Right-sided chest tube has been removed. No right-sided pneumothorax.        Interval placement of a NG tube with distal tip in the gastric   remnant.        Stable mediastinal drainage catheter, left chest tube, and right IJ   vein catheter. Stable tiny left apical pneumothorax.        Persistent left pleural effusion with associated left basilar   atelectasis/infiltrate.        Cardiomegaly with mild stable central vascular congestion.        MACRO:   None        Signed by: Bruno Gomez 7/5/2024 8:07 AM   Dictation workstation:   [id number]      XR abdomen 1 view   Final Result   Nasogastric tube with the distal tip projecting over the left   midabdomen.             MACRO:   None        Signed by: Angi Odonnell 7/4/2024 2:37 PM   Dictation workstation:   [id number]      XR abdomen 1 view   Final Result   NG tube has been placed with distal tip in the mid stomach.        Previous bariatric surgery.        Stable left pleural effusion with left basilar atelectasis/infiltrate.        Right chest tube has been removed. Additional tubes and lines in   place as described.        MACRO:   None        Signed by: Bruno Gomez 7/5/2024 8:05 AM   Dictation workstation:   [id number]      XR chest 1 view   Final Result   1. Linear line at the left lung apex may represent a subtle left   apical pneumothorax. Short-term follow-up with dedicated upright   chest radiograph series is recommended.   2. Left basilar opacity likely small effusion with  atelectasis.   3. Mild prominence of the pulmonary interstitium which may relate to   mild pulmonary edema.        MACRO:   Rl Morfin discussed the significance and urgency of this   critical finding epic chat with  CAROL VILLANUEVA on 7/4/2024 at 10:19   am.  (**-RCF-**) Findings:  See findings.        Signed by: Rl Morfin 7/4/2024 10:19 AM   Dictation workstation:   [id number]      XR chest 1 view   Final Result   Interval removal of ET tube and NG tube. Additional support devices   remain in place. Subtle bibasilar opacities.        MACRO:   None.        Signed by: Meredith Pulido 7/3/2024 8:29 AM   Dictation workstation:   [id number]      CT head wo IV contrast   Final Result   1. No acute intracranial abnormality identified.   2. Chronic white matter changes likely reflecting sequela of   small-vessel ischemic disease.   3. Chronic lacunar infarct in the right internal capsule/corona   radiata             If there is concern for superimposed acute ischemia or other   underlying abnormality then MRI may be performed in further   assessment.        MACRO:   None        Signed by: George Galdamez 7/3/2024 2:00 AM   Dictation workstation:   [id number]      XR chest 1 view   Final Result   Interval cardiothoracic surgery. Right-sided atelectasis.        MACRO:   none        Signed by: Mimi Garrido 7/2/2024 3:14 PM   Dictation workstation:   [id number]      Anesthesia Intraoperative Transesophageal Echocardiogram   Final Result           Medications  Scheduled medications  acetaminophen, 650 mg, oral, q6h  amLODIPine, 5 mg, oral, Daily  aspirin, 81 mg, oral, Daily  atorvastatin, 80 mg, oral, Nightly  enalapril, 20 mg, oral, BID  enoxaparin, 40 mg, subcutaneous, q24h  furosemide, 40 mg, intravenous, BID  insulin lispro, 0-15 Units, subcutaneous, q4h  isosorbide dinitrate, 20 mg, oral, TID  metoprolol tartrate, 50 mg, oral, BID  multivitamin with minerals, 1 tablet, nasogastric tube, Daily  polyethylene  glycol, 17 g, oral, Daily  potassium chloride, 20 mEq, nasogastric tube, BID  sennosides-docusate sodium, 2 tablet, oral, BID      Continuous medications     PRN medications  PRN medications: dextrose **OR** glucagon, naloxone, [Held by provider] oxyCODONE, [Held by provider] oxyCODONE, oxygen, traMADol    Assessment/Plan   Principal Problem:    Coronary artery disease involving native coronary artery of native heart  Active Problems:    Coronary artery disease involving native coronary artery of native heart with unstable angina pectoris (Multi)    Ms Rubi 70 y/o female with past medical history significant for CAD, HTN, HLD, TYLER, GERD, CVA on Plavix,  NIDDM II, Anemia. Work up for chest pain revealed  Multivessel disease including in stent re-stenosis of the circumflex and proximal LAD. She is admitted to ICU s/p Coronary Artery Bypass Graft x2 ; LIMA --> LAD, EVH RA --> RAMUS --> CX  EVH Right Radial Artery; EVH Right Saphenous Vein; CABG x 3 LIMA to LAD 26 ml/min, PI 3.6  Radial To Ramus, sequenced to Circ 26 ml/min,PI 1.6; Posterior Pericardial Window with Dr Kee on 7/2    Neuro  #H/O recent Right MCA CVA with residual Lt sided deficit (DAPT)  #Acute metabolic encephalopathy - suspect postoperative 2/2 CBP i/s/o recent large Right CVA: improved  #Expected acute postop pain: controlled  CT head negative for acute/subacute infarcts, redemonstration of prior chronic Rt MCA infarct  - Serial neuro and pain assessments   - Analgesia: scheduled Tylenol and PRN Ultram  - PT/OT/MITT  - Delirium precautions, sleep hygiene              -> Sitting up in bed HOB > 40              -> Lights, TV/music/conversation on; blinds open              -> Avoid narcotics if possible              -> Sleep/wake cycle  - Neurology signed off    CV  #H/O HTN, HLD  #MVD with in-stent restenosis s/p CABGx2, PPW  LVEF 65% pre/post  AV wires capped  - Cont EKG, NIBP  - ASA/statin/BB  - Continue home Amlodipine enalapril and imdur  -  Metoprolol 50mg titrate as HD permits  - Hold home Plavix (CVA) for now    Pulm  #TYLER  Postop CXR negative for acute process  CXR 7/4 with small L apical PTX- resolved  CXR 7/6 sm right effusion  - Wean FiO2 maintaining SpO2 >92%  - Acapella, IS    /Renal  #Preserved renal function (baseline Cr ~0.9)  - RFP daily, replace lytes prn  - Lasix 40 mg IVP bid w/ scheduled potassium  - Strict I&Os  - Maintain K > 4, Mg > 2  GI  #GERD  - NPO; failed BSE given mental status  - Speech eval by SLP today  - NG with TF, Glucerna 1.5  - Bowel regimen- senokot and miralax  - Zofran PRN      Endo   #NIDDM2  A1c 6.1  -  BG 200s  - POCT BG, ISS q4  - Hold home Metformin and Jardiance for now; plan to restart metformin when passes swallow eval and taking PO     Heme  #Acute on chronic post op blood loss anemia (baseline Hgb ~9.0) and thrombocytopenia    Total infused: 4 units prbc, 1 unit Plts  #Postop RUE hematoma surrounding radial harvest site  #7/2 POD 0 bedside exploration of RUE hematoma involving harvest site  - CBC daily  - DVT ppx: SCDs, Lovenox  - Neurovascular assessment of radial graft harvest q1  - Radial graft site care  -> Maintain gauze dressing to RUE exploration site              -> Notify ICU/CTS RAMSES if new or worsening firmness, bleeding, or loss of pulse ox or pulses              -> Elevation above heart if possible  - Wound care consulted  - Transfuse 1 unit PRBC today    ID  Afebrile, no current indications of infection  MRSA negative  Periop abx  - Trend temp, WBCs    Dispo: Maintain SDU home vs rehab sometime this week  Discussed with Dr Kee  Time spent on the assessment of patient, gathering and interpreting data, review of medical record/patient history, personally reviewing radiographic imaging and formulation of this note. With greater than 50% spent in personal discussion with patient/ family.  Time: 60 minutes    Urvashi Castaneda, APRN-CNP

## 2024-07-08 NOTE — PROGRESS NOTES
Speech-Language Pathology    SLP Adult Inpatient Speech-Language Pathology Clinical Swallow Evaluation    Patient Name: Rita Rubi  MRN: [id number]  Today's Date: 7/8/2024   Time Calculation  Start Time: 1546  Stop Time: 1629  Time Calculation (min): 43 min         Current Problem:   1. Coronary artery disease involving native coronary artery of native heart with unstable angina pectoris (Multi)  Anesthesia Intraoperative Transesophageal Echocardiogram    Anesthesia Intraoperative Transesophageal Echocardiogram      2. Primary hypertension  Anesthesia Intraoperative Transesophageal Echocardiogram    Anesthesia Intraoperative Transesophageal Echocardiogram            Recommendations:  Risk for Aspiration: Yes  Additional Recommendations: Modified barium swallow study, Dysphagia treatment  Solid Diet Recommendations : Soft & bite sized/chopped (IDDSI Level 6)  Liquid Diet Recommendations: Nectar thick/mildly thick (IDDS Level 2)  Compensatory Swallowing Strategies: Decrease distractions during eating/feeding, Upright 90 degrees as possible for all oral intake, Swallow 2 times per bite/sip, Single sips, Small bites/sips, Eat/feed slowly  Medication Administration Recommendations: Whole, With Liquid, With Pureed  Follow up treatments: Pharyngeal exercises, Diet tolerance monitoring, Patient/family education  Dysphagia Goals: Patient will tolerate recommended diet without observed clinical signs of aspiration, Patient will demonstrate appropriate strategies for swallowing safety, Family will demonstrate appropriate strategies for swallowing safety, Patient will progress to advanced diet      Assessment:  Prognosis: Good  Treatment Provided: Yes   Treatment Tolerance: Patient tolerated treatment well  Medical Staff Made Aware: Yes      Plan:  Inpatient/Swing Bed or Outpatient: Inpatient  Treatment/Interventions: Assess diet tolerance  SLP Plan: Skilled SLP  SLP Frequency: 2x per week  Duration: Current admission  Diet  Recommendations: Solid, Liquid  Solid Consistency: Soft & bite sized/chopped (IDDSI Level 6)  Liquid Consistency: Nectar thick/mildly thick (IDDS Level 2)  Next Treatment Priority: MBSS  Discussed POC: Patient, Caregiver/family, Nursing  Discussed Risks/Benefits: Yes, Patient, Caregiver/Family  Patient/Caregiver Agreeable: Yes  SLP - OK to Discharge: Yes      Subjective   Current Problem:  Patient s/p extubation following acute respiratory failure. Patient dx of CABG x 3. She has a history of dysphagia following CVA in 2023. The swallow evaluation was conducted to identify current swallowing skills and to determine the least restrictive diet consistency for a safe effective swallow.      General Visit Information:  Patient Class: Inpatient  Living Environment: Home  Ordering Physician: Ace  Reason for Referral: Swallow assessment  Past Medical History Relevant to Rehab: CVA, HTN, DM2, dysphagia  Patient Seen During This Visit: Yes  Prior to Session Communication: Bedside nurse  [employer] Diet: Regular/thin  Current Diet : Regular/thin  Dysphagia Diagnosis: Mild oral stage dysphagia, Mild pharyngeal stage dysphagia      Vital Signs:         Objective   The patient was sitting up in a chair. Speech was slow, mildly dysarthric. Patient also demonstrated dysfluency with initial syllable repetitions. She was alert, oriented x 3, able to follow commands and make wants/needs known. Her sisters were present for the assessment and subsequent therapy.     SLP presented ice chips, thin and mildly thick liquids. The patient demonstrated inconsistent throat clearing and coughing with thin liquids; most were delayed responses. She also was noted to have a wet vocal quality with straw sips of thin. SLP then presented pureed and hard solids. Suspect reduced bolus formation and control, mastication of hard solid was mildly disorganized, trace oral stasis.  No overt signs of penetration or aspiration with mildly thick liquids or  with any solid presentations.    SLP recommends diet change to soft and bite sized solids and mildly thick liquids, meds whole in pureed carrier or thickened liquids. SLP also recommends completion of an MBSS tomorrow to fully assess pharyngeal skills and r/o aspiration.    Results and recommendations were discussed with the patient and her family. All questions were answered to their satisfaction. Further recommendations following completion of the MBSS.        Baseline Assessment:  Baseline Vocal Quality: Normal  Volitional Cough: Strong  Volitional Swallow: Within Functional Limits      Pain:  Pain Assessment: 0-10  0-10 (Numeric) Pain Score: 0 - No pain       Oral/Motor Assessment:  Oral Hygiene: clear, moist, some secretions  Dentition: Adequate/Natural  Oral Motor: Impaired Function  Intelligibility: Intelligibility reduced  Intelligibility Ratin%-50%      Consistencies Trialed:  Consistencies Trialed: Yes  Consistencies Trialed: Ice Chips, Thin (IDDSI Level 0) - Straw, Thin (IDDSI Level 0) - Cup, Thin (IDDSI Level 0) - Spoon, Nectar thick/mildly thick (IDDSI Level 2) - Straw, Pureed/extremely thick (IDDSI Level 4), Regular (IDDSI Level 7)      Clinical Observations:  Patient Positioning: Upright in Chair  Management of Oral Secretions: Adequate  Was The 3 oz Swallow Protocol Completed: Yes  Signs/Symptoms of Aspiration: Cough, Throat Clearing, Wet Vocal Quality  Changes in VS Noted: Wet vocal quality  Prolonged Oral Manipulation: All consistencies Trialed  Oral Residue: Regular (IDDSI Level 7)  Reduced Laryngeal Movement Upon Palpation: All consistencies Trialed  Delayed Cough: Thin (IDDSI Level 0) - Straw, Thin (IDDSI Level 0) - Cup  Immediate Throat Clear: Thin (IDDSI Level 0) - Straw  Delayed Throat Clear: Thin (IDDSI Level 0) - Straw  Wet Vocal Quality: Thin (IDDSI Level 0) - Straw  Clinical Observation Comment: no overt signs with mildly thick

## 2024-07-08 NOTE — PROGRESS NOTES
Rita Rubi is a 69 y.o. female on day 6 of admission presenting with Coronary artery disease involving native coronary artery of native heart.     07/08/24 1154   Discharge Planning   Patient expects to be discharged to: SNF/no AUTH needed     Plan: Awaiting return call from brother for SNF choices. No AUTH needed. Awaiting Speech evaluation.  Disposition: new SNF/no AUTH needed   Barrier: SNF choices, speech  ADOD:  tomorrow    1430pm  Spoke with patient and her two sisters in room regarding SNF placement. Agreeable to go to rehab prior to returning home. Provided skilled nursing list to patient/family  from [facility] directory that includes facilities that are within  [facility], as well as meeting patient's medical needs, and are in-network for patient's insurance; while also in discharge geographic area patient prefers, and identifies each facilities CMS star rating. Will need follow up for patient's choices.    Oriana Rojas RN

## 2024-07-08 NOTE — RESEARCH NOTES
Artificial Intelligence Monitoring in Nursing (AIMS Nursing) Study    Principle Investigator - Dr. Alex Moe  Research Coordinator - Barrera Rushing RN     Patient Name - Rita Rubi  Date - 7/8/2024 2:59 PM  Location - 4th Floor ICU, [facility]    Rita Rubi was approached by Barrera Rushing RN to talk about participating in the AIMS Nursing Study. The patient wanted to read over the consent form and decide later. Study protocol was followed and patient was given study contact information.     Barrera Rushing RN

## 2024-07-08 NOTE — PROGRESS NOTES
Physical Therapy    Physical Therapy Treatment    Patient Name: Rita Rubi  MRN: [id number]  Today's Date: 7/8/2024  Time Calculation  Start Time: 1359  Stop Time: 1424  Time Calculation (min): 25 min    Assessment/Plan   PT Assessment  PT Assessment Results: Decreased strength, Decreased mobility, Impaired judgement, Decreased safety awareness, Decreased endurance, Impaired balance, Decreased cognition, Pain, Orthopedic restrictions  Rehab Prognosis: Fair  Barriers to Discharge: Needing 2 person assist for all mobility, unable to hold trunk for upright sitting  Evaluation/Treatment Tolerance: Patient limited by fatigue  Medical Staff Made Aware: Yes  End of Session Communication: Bedside nurse  Assessment Comment: PT tx completed. PT needing increased hands on assist for all OOB mobility at this time. Needing increased time and cuing throughout for safe mobility and sequencing with precautions follow. Needing continued PT for addressing functional deficits.   End of Session Patient Position: Alarm on, Up in chair  PT Plan  Inpatient/Swing Bed or Outpatient: Inpatient  PT Plan  Treatment/Interventions: Bed mobility, Transfer training, Gait training, Balance training, Neuromuscular re-education, Strengthening, Endurance training, Therapeutic exercise, Home exercise program, Therapeutic activity, Positioning, Postural re-education  PT Plan: Ongoing PT  PT Frequency: 5 times per week  PT Discharge Recommendations: Moderate intensity level of continued care  Equipment Recommended upon Discharge: Wheelchair, Lift  PT Recommended Transfer Status: Assist x2  PT - OK to Discharge: Yes (Per PT POC)      General Visit Information:   PT  Visit  PT Received On: 07/08/24  Response to Previous Treatment: Patient reporting fatigue but able to participate.  General  Family/Caregiver Present: Yes  Caregiver Feedback: Pt sisters present in room throughout session, encouraging and asking appropriate queries  Co-Treatment:  OT  Co-Treatment Reason: To maximize pt safety, mobility and performance  Prior to Session Communication: Bedside nurse  Patient Position Received:  (on BSC, nursing present, needing assist for hygiene s/p bowel movement)  Preferred Learning Style: auditory, verbal  General Comment: Pt up on bedside commode upon OT/PT arrival, agreeable and cooperative to PT tx. Limited by fatigue and generalized weakness.    Subjective   Precautions:  Precautions  Medical Precautions: Fall precautions  Post-Surgical Precautions: Move in the Tube    Objective   Pain:  Pain Assessment  Pain Assessment: Johnson-Baker FACES  Johnson-Baker FACES Pain Rating: Hurts even more  Pain Type: Acute pain  Pain Location: Wrist  Pain Orientation: Right  Pain Descriptors: Heaviness, Jabbing  Pain Frequency: Intermittent  Clinical Progression: Not changed  Pain Interventions: Repositioned, Ambulation/increased activity, Distraction, Emotional support  Response to Interventions: Able to participate as requested by therapy, nursing notified of pt pain  Cognition:  Cognition  Overall Cognitive Status: Impaired  Arousal/Alertness: Delayed responses to stimuli (Very lethargic)  Orientation Level: Disoriented to place, Disoriented to time  Following Commands: Follows one step commands with increased time  Safety Judgment: Decreased awareness of need for assistance  Problem Solving: Assistance required to identify errors made  Cognition Comments: Intermittent confusion; increase fatigue and lethargy.  Processing Speed: Delayed  Coordination:  Movements are Fluid and Coordinated: No  Upper Body Coordination: Limited strength and AROM of BLE  Lower Body Coordination: Limited mobility of BLE due to decreased strength  Coordination Comment: Difficulty with coordination of BUE for transfer tasks, needing AAROM for all therex this date  Postural Control:  Postural Control  Postural Control: Impaired  Trunk Control: Intermittant trunk LOB, pt reports can feel it  "happening, unable to self correct  Static Sitting Balance  Static Sitting-Balance Support: Feet supported, Bilateral upper extremity supported  Static Sitting-Level of Assistance: Minimum assistance  Dynamic Sitting Balance  Dynamic Sitting-Balance Support: Feet supported, Bilateral upper extremity supported  Dynamic Sitting-Balance: Lateral lean, Trunk control activities  Dynamic Sitting-Comments: Mod Ax1  Static Standing Balance  Static Standing-Balance Support: Bilateral upper extremity supported  Static Standing-Level of Assistance: Maximum assistance (x2)  Dynamic Standing Balance  Dynamic Standing-Balance Support: Bilateral upper extremity supported  Dynamic Standing-Balance: Turning (Side stepping)  Dynamic Standing-Comments: Mod Ax2    Activity Tolerance:  Activity Tolerance  Endurance: Tolerates 10 - 20 min exercise with multiple rests  Treatments:  Therapeutic Exercise  Therapeutic Exercise Performed: Yes  Therapeutic Exercise Activity 1: Educated, instructed, and cued on AP, BUE \"punching,\" elbow flex/ext, and grasp/release of B hands x5. Needing PT hands on AAROM for completion of all therex.    Therapeutic Activity  Therapeutic Activity Performed: Yes  Therapeutic Activity 1: Increased education to pt family about plan of care, progression with therapy, assist during sitting with conversation for increased cognitive follow and decreased risk for delirium.    Balance/Neuromuscular Re-Education  Balance/Neuromuscular Re-Education Activity Performed: Yes  Balance/Neuromuscular Re-Education Activity 1: Increased time for attempting upright posture with cuing for upright trunk, extension at hips, straightening legs, and upright gaze. Increased focus on static standing while nursing performing pericare s/p toileting. Standing >/= 5 minutes intial trial. Increased cuing and completion during attempted gait and move to chair. Continued Increased education about trunk stabilization in chair.    Bed Mobility  Bed " Mobility: No    Ambulation/Gait Training  Ambulation/Gait Training Performed: Yes  Ambulation/Gait Training 1  Surface 1: Level tile  Device 1: No device  Gait Support Devices: Gait belt  Assistance 1: Arm in arm assistance, Moderate assistance (x2)  Quality of Gait 1: Wide base of support, Diminished heel strike, Forward flexed posture, Shuffling gait (Increased hands on assist for upright propulsion and balance with increased verbal and tactile cuing for BLE advancement. Cues for continued ambulation as pt wanting to sit early.)  Comments/Distance (ft) 1: 5'  Transfers  Transfer: Yes  Transfer 1  Technique 1: Sit to stand, Stand to sit  Transfer Device 1: Gait belt  Transfer Level of Assistance 1: Arm in arm assistance, Maximum assistance, +2  Trials/Comments 1: Multiple trials occuring for nursing pericare and additional stance to return from BSC to chair. Increased focus on knee blocking and BUE support. Cues for upright posture and gaze.    Stairs  Stairs: No    Outcome Measures:  [facility] Basic Mobility  Turning from your back to your side while in a flat bed without using bedrails: Total  Moving from lying on your back to sitting on the side of a flat bed without using bedrails: Total  Moving to and from bed to chair (including a wheelchair): Total  Standing up from a chair using your arms (e.g. wheelchair or bedside chair): Total  To walk in hospital room: Total  Climbing 3-5 steps with railing: Total  Basic Mobility - Total Score: 6    FSS-ICU  Ambulation: Walks <50 feet with any assistance x1 or walks any distance with assistance x2 people  Rolling: Maximal assistance (performs 25% - 49% of task)  Sitting: Maximal assistance (performs 25% - 49% of task)  Transfer Sit-to-Stand: Total assistance (performs 25% or requires another person)  Transfer Supine-to-Sit: Total assistance (performs 25% or requires another person)  Total Score: 7      E = Exercise and Early Mobility  ICU Mobility Scale: Transferring bed to  chair    Education Documentation  Handouts, taught by Gato Bernardo, PT at 7/8/2024  4:15 PM.  Learner: Patient  Readiness: Nonacceptance  Method: Demonstration, Explanation  Response: Needs Reinforcement  Comment: Pt more alert this date, able to accept more education, family asking appropriate queries, thankful for education    Precautions, taught by Gato Bernardo, PT at 7/8/2024  4:15 PM.  Learner: Patient  Readiness: Nonacceptance  Method: Demonstration, Explanation  Response: Needs Reinforcement  Comment: Pt more alert this date, able to accept more education, family asking appropriate queries, thankful for education    Body Mechanics, taught by Gato Bernardo, PT at 7/8/2024  4:15 PM.  Learner: Patient  Readiness: Nonacceptance  Method: Demonstration, Explanation  Response: Needs Reinforcement  Comment: Pt more alert this date, able to accept more education, family asking appropriate queries, thankful for education    Mobility Training, taught by Gato Bernardo, PT at 7/8/2024  4:15 PM.  Learner: Patient  Readiness: Nonacceptance  Method: Demonstration, Explanation  Response: Needs Reinforcement  Comment: Pt more alert this date, able to accept more education, family asking appropriate queries, thankful for education    Education Comments  No comments found.        OP EDUCATION:       Encounter Problems       Encounter Problems (Active)       Balance       STG - Maintains static sitting balance with upper extremity support At Min Ax1, safely, without LOB, device PRN  (Progressing)       Start:  07/07/24    Expected End:  07/21/24       INTERVENTIONS:  1. Practice sitting on the edge of a bed/mat with minimal support.  2. Educate patient about maintining total hip precautions while maintaining balance.  3. Educate patient about pressure relief.  4. Educate patient about use of assistive device.         STG - Maintains dynamic sitting balance with upper extremity support At Mod Ax1, safely,  without LOB, device PRN  (Progressing)       Start:  07/07/24    Expected End:  07/21/24       INTERVENTIONS:  1. Practice sitting on the edge of a bed/mat with minimal support.  2. Educate patient about maintining total hip precautions while maintaining balance.  3. Educate patient about pressure relief.  4. Educate patient about use of assistive device.            Mobility       Endurance - Pt to tolerate >/= 20 minutes therex, theract, and/or NMR with </= 5 minutes of rest breaks  (Progressing)       Start:  07/07/24    Expected End:  07/21/24               Mobility       STG - Patient will ambulate At Mod Ax1 using Wheeled walker for 10'  without LOB or gross gait deviations        Start:  07/08/24    Expected End:  07/22/24               PT Transfers       STG - Patient will perform bed mobility At Min Ax1, safely, without LOB, device PRN  (Progressing)       Start:  07/07/24    Expected End:  07/21/24            STG - Patient will roll At Min Ax1, safely, without LOB, device PRN  (Progressing)       Start:  07/07/24    Expected End:  07/21/24            STG - Patient will transfer sit to and from stand At Max Ax1, safely, without LOB, device PRN  (Progressing)       Start:  07/07/24    Expected End:  07/21/24               Pain - Adult          Safety       Pt will verbalize and apply safety awareness and precautions 100% of time throughout entire session   (Progressing)       Start:  07/07/24    Expected End:  07/21/24

## 2024-07-08 NOTE — PROGRESS NOTES
Occupational Therapy    OT Treatment    Patient Name: Rita Rubi  MRN: [id number]  Today's Date: 7/8/2024  Time Calculation  Start Time: 1358  Stop Time: 1423  Time Calculation (min): 25 min        Assessment:  OT Assessment: Pt progressing toward OT tx goals, able to participate in seated ADL tasks as well as functional mobility requiring heavy assist. Therapy limited by fatigue and generalized BUE/LE weakness.  Evaluation/Treatment Tolerance: Patient limited by fatigue, Patient limited by pain  Medical Staff Made Aware: Yes  End of Session Communication: Bedside nurse  End of Session Patient Position: Up in chair, Alarm on  OT Assessment Results: Decreased ADL status, Decreased upper extremity range of motion, Decreased upper extremity strength, Decreased endurance, Decreased fine motor control, Decreased functional mobility, Decreased IADLs, Decreased trunk control for functional activities  Evaluation/Treatment Tolerance: Patient limited by fatigue, Patient limited by pain  Medical Staff Made Aware: Yes  Plan:  Treatment Interventions: ADL retraining, Functional transfer training, UE strengthening/ROM, Endurance training, Equipment evaluation/education, Neuromuscular reeducation, Fine motor coordination activities, Compensatory technique education  OT Frequency: 3 times per week  OT Discharge Recommendations: High intensity level of continued care  Equipment Recommended upon Discharge: Wheelchair, Lift  OT Recommended Transfer Status: Assist of 2  OT - OK to Discharge: Yes  Treatment Interventions: ADL retraining, Functional transfer training, UE strengthening/ROM, Endurance training, Equipment evaluation/education, Neuromuscular reeducation, Fine motor coordination activities, Compensatory technique education    Subjective   Previous Visit Info:  OT Last Visit  OT Received On: 07/08/24  General:  General  Reason for Referral: Recent Cardiothoracic sx: 69 y.o. female s/p CABG x2 on 7/2 w/ increase  weakness  Referred By: MD Ace  Past Medical History Relevant to Rehab:   Past Medical History:   Diagnosis Date    Allergic rhinitis     Anemia     Anticoagulated     Plavix    Arthritis     left knee    Coronary artery disease involving native coronary artery of native heart, unspecified whether angina present     GERD (gastroesophageal reflux disease)     under control    Hyperlipidemia     Hypertension     Ischemic stroke (Multi) 09/2023    Cerebral infarction due to thrombosis of right middle cerebral artery    TYLER (obstructive sleep apnea)     does not use CPAP (stopped after her bariatric surgery)    Stuttering     has become worse since her stroke    Type 2 diabetes mellitus (Multi)     1/26/2024 A1C 5.7%       Family/Caregiver Present: Yes  Co-Treatment: PT  Co-Treatment Reason: To maximize pt safety, mobility and performance  Prior to Session Communication: Bedside nurse  Patient Position Received:  (Bedside commode with RN)  Preferred Learning Style: auditory, kinesthetic, verbal, visual  General Comment: Pt up on bedside commode upon OT/PT arrival, agreeable and cooperative to OT tx. Limited by fatigue and generalized weakness.  Precautions:  Medical Precautions: Fall precautions, Cardiac precautions  Post-Surgical Precautions: Move in the Tube     Pain:  Pain Assessment  Pain Assessment: 0-10  0-10 (Numeric) Pain Score: 0 - No pain    Objective       Coordination:  Upper Body Coordination: Limited strength and AROM of BUE  Lower Body Coordination: Limited mobility of BLE due to decreased strength  Activities of Daily Living: Grooming  Grooming Level of Assistance: Minimum assistance, Setup, Close supervision  Grooming Where Assessed: Chair  Grooming Comments: Pt performed oral care (brushing teeth) with set up assist, opening up cap, howver, able to squeeze toothpaste. Min A w/ verbal and tactile cues to wash face with washcloth.    Toileting  Toileting Level of Assistance: Dependent  Where Assessed:  Bedside commode  Toileting Comments: Assist x3: +2 assist for steadying +1 for pericare. Cues for forward gaze and upright posture.  Functional Standing Tolerance:  Functional Standing Tolerance Comments: Decrease standing tolerance ~1-2 minutes with encouragement.  Bed Mobility/Transfers:      Transfers  Transfer: Yes  Transfer 1  Transfer From 1: Sit to (Bedside commode)  Transfer to 1: Stand (Chair with arms)  Technique 1: Sit to stand, Stand to sit  Transfer Device 1: Gait belt  Transfer Level of Assistance 1: Arm in arm assistance, Maximum assistance, +2  Trials/Comments 1: Arm in arm assist and Max Ax2 for standing transfer      Functional Mobility:  Functional Mobility  Functional Mobility Performed: Yes  Functional Mobility 1  Surface 1: Level tile  Device 1:  (arm in arm assist / hand held assist)  Functional Mobility Support Devices: Gait belt  Assistance 1: Moderate assistance (x2)  Comments 1: Pt functionally navigated lateral side steps from bedside commode to chair with arms, Mod A x2 with Mod-max verbal and tacile cues for sequencing, body positioning and hand placement for safe transfer.  Sitting Balance:  Static Sitting Balance  Static Sitting-Balance Support: Feet supported, Right upper extremity supported, Left upper extremity supported  Static Sitting-Level of Assistance: Close supervision, Contact guard  Static Sitting-Comment/Number of Minutes: SBA-CGA  Dynamic Sitting Balance  Dynamic Sitting-Balance Support: Feet supported, Right upper extremity supported, Left upper extremity supported  Dynamic Sitting-Comments: SBA-CGA  Standing Balance:  Static Standing Balance  Static Standing-Balance Support: Bilateral upper extremity supported (Arm in arm assist)  Static Standing-Level of Assistance: Maximum assistance (x2)  Dynamic Standing Balance  Dynamic Standing-Balance Support: Bilateral upper extremity supported  Dynamic Standing-Comments: Mod-Max A x2     Therapy/Activity: Therapeutic  Activity  Therapeutic Activity Performed: Yes  Therapeutic Activity 1: Pt performed supported seated dynamic forward/backward reaching x5 reps each, with max encouragement and cues.       RUE   RUE : Exceptions to WFL and NARGIS BARILLAS: Exceptions to WFL    Outcome Measures:[facility] Daily Activity  Putting on and taking off regular lower body clothing: Total  Bathing (including washing, rinsing, drying): A lot  Putting on and taking off regular upper body clothing: A lot  Toileting, which includes using toilet, bedpan or urinal: Total  Taking care of personal grooming such as brushing teeth: A lot  Eating Meals: A lot  Daily Activity - Total Score: 10        Education Documentation  Handouts, taught by Juliette De La Paz OT at 7/8/2024  3:05 PM.  Learner: Patient  Readiness: Acceptance  Method: Explanation, Demonstration  Response: Verbalizes Understanding, Needs Reinforcement    Body Mechanics, taught by Juliette De La Paz OT at 7/8/2024  3:05 PM.  Learner: Patient  Readiness: Acceptance  Method: Explanation, Demonstration  Response: Verbalizes Understanding, Needs Reinforcement    Precautions, taught by Juliette De La Paz OT at 7/8/2024  3:05 PM.  Learner: Patient  Readiness: Acceptance  Method: Explanation, Demonstration  Response: Verbalizes Understanding, Needs Reinforcement    ADL Training, taught by Juliette De La Paz OT at 7/8/2024  3:05 PM.  Learner: Patient  Readiness: Acceptance  Method: Explanation, Demonstration  Response: Verbalizes Understanding, Needs Reinforcement    Education Comments  No comments found.        OP EDUCATION:  Education  Education Comment: MITT precautions    Goals:  Encounter Problems       Encounter Problems (Active)       ADLs       Patient will perform UB and LB sponge bathing with moderate assist level of assistance. (Progressing)       Start:  07/07/24    Expected End:  07/21/24            Patient with complete upper body dressing with minimal assist  level of assistance donning and doffing  all UE clothes with PRN adaptive equipment while supported sitting and edge of bed  (Progressing)       Start:  07/07/24    Expected End:  07/21/24            Patient with complete lower body dressing with moderate assist level of assistance donning and doffing all LE clothes  with PRN adaptive equipment while edge of bed  (Progressing)       Start:  07/07/24    Expected End:  07/21/24            Patient will feed self with moderate assist level of assistance and verbal cues, tactile cues, and visual cues using PRN adaptive equipment. (Progressing)       Start:  07/07/24    Expected End:  07/21/24            Patient will complete daily grooming tasks with minimal assist  level of assistance and PRN adaptive equipment while supported sitting and edge of bed . (Progressing)       Start:  07/07/24    Expected End:  07/21/24            Patient will complete toileting including hygiene clothing management/hygiene with moderate assist level of assistance and bedside commode. (Progressing)       Start:  07/07/24    Expected End:  07/21/24               BALANCE       Patient will tolerate static and dynamic sitting for >=5 minutes to moderate assist level of assistance with use of bed rails in order to improve functional activity tolerance for ADL tasks. (Progressing)       Start:  07/07/24    Expected End:  07/21/24               TRANSFERS       Patient will perform bed mobility moderate assist level of assistance and bed rails in order to improve safety and independence with mobility (Progressing)       Start:  07/07/24    Expected End:  07/21/24            Patient will complete sit to stand transfer with moderate assist level of assistance and least restrictive device in order to improve safety and prepare for out of bed mobility. (Progressing)       Start:  07/07/24    Expected End:  07/21/24

## 2024-07-08 NOTE — CARE PLAN
Left  VM  for pt's  brother to discuss  dc  plans.     CARINE Raymond, ROBERTW        7-8-24  5620  Tried again to  reach brother - no  answer    CARINE Raymond, ROBERTW

## 2024-07-08 NOTE — CARE PLAN
Problem: Discharge Planning  Goal: Discharge to home or other facility with appropriate resources  Outcome: Progressing     Problem: Pain - Adult  Goal: Verbalizes/displays adequate comfort level or baseline comfort level  Outcome: Met     Problem: Safety - Adult  Goal: Free from fall injury  Outcome: Met     Problem: Chronic Conditions and Co-morbidities  Goal: Patient's chronic conditions and co-morbidity symptoms are monitored and maintained or improved  Outcome: Met

## 2024-07-08 NOTE — PROGRESS NOTES
07/08/24 1608   Current Planned Discharge Disposition   Current Planned Discharge Disposition SNF     1608     7-8-24      SW  spoke with pt  and  sister  Paresh at  bedside at   [phone]. They  choiced    1[facility]  [facility] to  send  [facility]erral    Pt's  brother  called  at   1646  and agreed to  referrals.   No response  back yet  from facilities.     Malgorzata Beavers, MSW, LSW

## 2024-07-09 ENCOUNTER — APPOINTMENT (OUTPATIENT)
Dept: CARDIOLOGY | Facility: HOSPITAL | Age: 69
DRG: 235 | End: 2024-07-09
Payer: MEDICARE

## 2024-07-09 ENCOUNTER — APPOINTMENT (OUTPATIENT)
Dept: RADIOLOGY | Facility: HOSPITAL | Age: 69
DRG: 235 | End: 2024-07-09
Payer: MEDICARE

## 2024-07-09 LAB
ALBUMIN SERPL BCP-MCNC: 2.9 G/DL (ref 3.4–5)
ANION GAP SERPL CALC-SCNC: 12 MMOL/L (ref 10–20)
BUN SERPL-MCNC: 27 MG/DL (ref 6–23)
CALCIUM SERPL-MCNC: 8.8 MG/DL (ref 8.6–10.3)
CHLORIDE SERPL-SCNC: 103 MMOL/L (ref 98–107)
CO2 SERPL-SCNC: 26 MMOL/L (ref 21–32)
CREAT SERPL-MCNC: 0.73 MG/DL (ref 0.5–1.05)
EGFRCR SERPLBLD CKD-EPI 2021: 88 ML/MIN/1.73M*2
ERYTHROCYTE [DISTWIDTH] IN BLOOD BY AUTOMATED COUNT: 18.1 % (ref 11.5–14.5)
GLUCOSE BLD MANUAL STRIP-MCNC: 110 MG/DL (ref 74–99)
GLUCOSE BLD MANUAL STRIP-MCNC: 135 MG/DL (ref 74–99)
GLUCOSE BLD MANUAL STRIP-MCNC: 180 MG/DL (ref 74–99)
GLUCOSE BLD MANUAL STRIP-MCNC: 229 MG/DL (ref 74–99)
GLUCOSE BLD MANUAL STRIP-MCNC: 291 MG/DL (ref 74–99)
GLUCOSE SERPL-MCNC: 129 MG/DL (ref 74–99)
HCT VFR BLD AUTO: 27 % (ref 36–46)
HGB BLD-MCNC: 8.4 G/DL (ref 12–16)
MAGNESIUM SERPL-MCNC: 2.2 MG/DL (ref 1.6–2.4)
MCH RBC QN AUTO: 29.1 PG (ref 26–34)
MCHC RBC AUTO-ENTMCNC: 31.1 G/DL (ref 32–36)
MCV RBC AUTO: 93 FL (ref 80–100)
NRBC BLD-RTO: 0 /100 WBCS (ref 0–0)
PHOSPHATE SERPL-MCNC: 3.1 MG/DL (ref 2.5–4.9)
PLATELET # BLD AUTO: 197 X10*3/UL (ref 150–450)
POTASSIUM SERPL-SCNC: 4.1 MMOL/L (ref 3.5–5.3)
RBC # BLD AUTO: 2.89 X10*6/UL (ref 4–5.2)
SODIUM SERPL-SCNC: 137 MMOL/L (ref 136–145)
WBC # BLD AUTO: 7.2 X10*3/UL (ref 4.4–11.3)

## 2024-07-09 PROCEDURE — 74230 X-RAY XM SWLNG FUNCJ C+: CPT

## 2024-07-09 PROCEDURE — 2060000001 HC INTERMEDIATE ICU ROOM DAILY

## 2024-07-09 PROCEDURE — 93005 ELECTROCARDIOGRAM TRACING: CPT

## 2024-07-09 PROCEDURE — 2500000005 HC RX 250 GENERAL PHARMACY W/O HCPCS: Performed by: STUDENT IN AN ORGANIZED HEALTH CARE EDUCATION/TRAINING PROGRAM

## 2024-07-09 PROCEDURE — 83735 ASSAY OF MAGNESIUM: CPT | Performed by: NURSE PRACTITIONER

## 2024-07-09 PROCEDURE — 94660 CPAP INITIATION&MGMT: CPT

## 2024-07-09 PROCEDURE — 9420000001 HC RT PATIENT EDUCATION 5 MIN

## 2024-07-09 PROCEDURE — 2500000004 HC RX 250 GENERAL PHARMACY W/ HCPCS (ALT 636 FOR OP/ED): Performed by: NURSE PRACTITIONER

## 2024-07-09 PROCEDURE — 85027 COMPLETE CBC AUTOMATED: CPT | Performed by: NURSE PRACTITIONER

## 2024-07-09 PROCEDURE — 94668 MNPJ CHEST WALL SBSQ: CPT

## 2024-07-09 PROCEDURE — 36415 COLL VENOUS BLD VENIPUNCTURE: CPT | Performed by: NURSE PRACTITIONER

## 2024-07-09 PROCEDURE — 82947 ASSAY GLUCOSE BLOOD QUANT: CPT

## 2024-07-09 PROCEDURE — 74230 X-RAY XM SWLNG FUNCJ C+: CPT | Performed by: RADIOLOGY

## 2024-07-09 PROCEDURE — 80069 RENAL FUNCTION PANEL: CPT | Performed by: NURSE PRACTITIONER

## 2024-07-09 PROCEDURE — 2500000002 HC RX 250 W HCPCS SELF ADMINISTERED DRUGS (ALT 637 FOR MEDICARE OP, ALT 636 FOR OP/ED): Performed by: NURSE PRACTITIONER

## 2024-07-09 PROCEDURE — 2500000001 HC RX 250 WO HCPCS SELF ADMINISTERED DRUGS (ALT 637 FOR MEDICARE OP): Performed by: NURSE PRACTITIONER

## 2024-07-09 PROCEDURE — 97110 THERAPEUTIC EXERCISES: CPT | Mod: GP

## 2024-07-09 PROCEDURE — 99232 SBSQ HOSP IP/OBS MODERATE 35: CPT | Performed by: NURSE PRACTITIONER

## 2024-07-09 PROCEDURE — 2500000001 HC RX 250 WO HCPCS SELF ADMINISTERED DRUGS (ALT 637 FOR MEDICARE OP)

## 2024-07-09 PROCEDURE — 92611 MOTION FLUOROSCOPY/SWALLOW: CPT | Mod: GN

## 2024-07-09 RX ORDER — METFORMIN HYDROCHLORIDE 500 MG/1
500 TABLET ORAL
Status: DISCONTINUED | OUTPATIENT
Start: 2024-07-09 | End: 2024-07-10 | Stop reason: HOSPADM

## 2024-07-09 RX ORDER — ISOSORBIDE MONONITRATE 30 MG/1
30 TABLET, EXTENDED RELEASE ORAL DAILY
Status: DISCONTINUED | OUTPATIENT
Start: 2024-07-10 | End: 2024-07-10 | Stop reason: HOSPADM

## 2024-07-09 RX ORDER — INSULIN LISPRO 100 [IU]/ML
0-15 INJECTION, SOLUTION INTRAVENOUS; SUBCUTANEOUS
Status: DISCONTINUED | OUTPATIENT
Start: 2024-07-10 | End: 2024-07-10 | Stop reason: HOSPADM

## 2024-07-09 ASSESSMENT — PAIN - FUNCTIONAL ASSESSMENT
PAIN_FUNCTIONAL_ASSESSMENT: 0-10

## 2024-07-09 ASSESSMENT — COGNITIVE AND FUNCTIONAL STATUS - GENERAL
CLIMB 3 TO 5 STEPS WITH RAILING: TOTAL
MOBILITY SCORE: 11
MOVING TO AND FROM BED TO CHAIR: A LOT
WALKING IN HOSPITAL ROOM: A LOT
STANDING UP FROM CHAIR USING ARMS: A LOT
TURNING FROM BACK TO SIDE WHILE IN FLAT BAD: A LOT
MOVING FROM LYING ON BACK TO SITTING ON SIDE OF FLAT BED WITH BEDRAILS: A LOT

## 2024-07-09 ASSESSMENT — ENCOUNTER SYMPTOMS
COUGH: 0
WEAKNESS: 1
WOUND: 1
CONSTIPATION: 0
WHEEZING: 0
HEMATOLOGIC/LYMPHATIC NEGATIVE: 1
MUSCULOSKELETAL NEGATIVE: 1
PSYCHIATRIC NEGATIVE: 1
VOMITING: 0
EYES NEGATIVE: 1
SPEECH DIFFICULTY: 0
FREQUENCY: 0
PALPITATIONS: 0
FEVER: 0
ENDOCRINE NEGATIVE: 1
NAUSEA: 0
TROUBLE SWALLOWING: 1
SHORTNESS OF BREATH: 0
FATIGUE: 1
DIARRHEA: 0
ABDOMINAL DISTENTION: 0
DIZZINESS: 0
DYSURIA: 0
APPETITE CHANGE: 0

## 2024-07-09 ASSESSMENT — PAIN SCALES - GENERAL
PAINLEVEL_OUTOF10: 0 - NO PAIN
PAINLEVEL_OUTOF10: 2

## 2024-07-09 NOTE — CARE PLAN
Problem: Discharge Planning  Goal: Discharge to home or other facility with appropriate resources  Outcome: Progressing     Problem: Fall/Injury  Goal: Not fall by end of shift  Outcome: Progressing     Problem: Pain  Goal: Walks with improved pain control throughout the shift  Outcome: Progressing     Problem: Respiratory  Goal: No signs of respiratory distress (eg. Use of accessory muscles. Peds grunting)  Outcome: Progressing

## 2024-07-09 NOTE — PROGRESS NOTES
Rita Rubi is a 69 y.o. female on day 7 of admission presenting with Coronary artery disease involving native coronary artery of native heart    Subjective   Pt seen and examined, sitting in bedside chair  Reports she feels her left sided weakness is back to baseline, able to get OOB with assistance  Complaining about food consistency, plan for MBS today  No acute events overnight    Objective     Physical Exam  Vitals and nursing note reviewed.   Constitutional:       Appearance: She is obese. She is not ill-appearing.   HENT:      Head: Normocephalic.      Mouth/Throat:      Mouth: Mucous membranes are moist.   Cardiovascular:      Rate and Rhythm: Normal rate and regular rhythm.      Comments: TELE: SR 90s  MSI stable w/o click, well approximated w/o drainage or hematoma   Pacer wires capped  Pulmonary:      Effort: Pulmonary effort is normal. No respiratory distress.      Breath sounds: Normal breath sounds. No stridor. No wheezing.      Comments: No audible wheeze  Abdominal:      General: There is no distension.      Palpations: Abdomen is soft.      Tenderness: There is no guarding.      Comments: + BM   Genitourinary:     Comments: Voiding post plasencia removal  Musculoskeletal:         General: Swelling present.      Cervical back: Normal range of motion.      Right lower le+ Edema present.      Left lower le+ Edema present.      Comments: -Left SVG site w/o drainage +bruising  -Right AC Radial harvest with some ss drainage +bruising +hematoma   - left brachial art line site w/o  drainage  Generalized bilateral LE edema      Skin:     Capillary Refill: Capillary refill takes less than 2 seconds.      Findings: Bruising present.   Neurological:      Mental Status: She is alert and oriented to person, place, and time. Mental status is at baseline.      Motor: Weakness present.      Comments: LUE 3/5  RUE 4/5   Psychiatric:         Mood and Affect: Mood normal.         Behavior: Behavior normal.  "        Review of Systems   Constitutional:  Positive for fatigue. Negative for appetite change and fever.   HENT:  Positive for trouble swallowing.    Eyes: Negative.    Respiratory:  Negative for cough, shortness of breath and wheezing.    Cardiovascular:  Positive for leg swelling. Negative for chest pain and palpitations.   Gastrointestinal:  Negative for abdominal distention, constipation, diarrhea, nausea and vomiting.   Endocrine: Negative.    Genitourinary:  Negative for dysuria, frequency and urgency.   Musculoskeletal: Negative.    Skin:  Positive for wound.   Neurological:  Positive for weakness. Negative for dizziness and speech difficulty.   Hematological: Negative.    Psychiatric/Behavioral: Negative.         Last Recorded Vitals   Blood pressure 125/62, pulse 96, temperature 36.6 °C (97.9 °F), temperature source Temporal, resp. rate 16, height 1.689 m (5' 6.5\"), weight 101 kg (223 lb 12.3 oz), SpO2 96%.    Intake/Output Last 3 Shifts  I/O last 3 completed shifts:  In: 2705.2 (26.7 mL/kg) [I.V.:187.2 (1.8 mL/kg); Blood:300; NG/GT:2218]  Out: 1225 (12.1 mL/kg) [Urine:1225 (0.3 mL/kg/hr)]  Weight: 101.5 kg            Recent Labs  CMP:  Results from last 7 days   Lab Units 07/09/24  0524 07/08/24  0512 07/07/24  0920 07/06/24  0556 07/05/24  0500 07/04/24  0555 07/03/24  0523 07/02/24  1510   SODIUM mmol/L 137 139 139 138 137 142 141 143   POTASSIUM mmol/L 4.1 4.1 4.1 3.8 3.7 3.9 3.8 4.1   CHLORIDE mmol/L 103 103 104 104 104 108* 108* 108*   CO2 mmol/L 26 29 28 25 23 27 27 27   ANION GAP mmol/L 12 11 11 13 14 11 10 12   BUN mg/dL 27* 27* 24* 20 19 17 16 16   CREATININE mg/dL 0.73 0.74 0.72 0.62 0.75 0.78 0.80 0.99   EGFR mL/min/1.73m*2 88 87 90 >90 85 81 79 61   GLUCOSE mg/dL 129* 200* 230* 179* 196* 171* 212* 183*   MAGNESIUM mg/dL 2.20 2.00 2.10 2.10 1.80 2.10 2.20 3.10*   ALBUMIN g/dL 2.9* 2.9* 3.0* 2.7* 2.9* 3.1* 3.5 3.3*     CBC:  Results from last 7 days   Lab Units 07/09/24  0524 07/08/24  0512 " 07/07/24  0808 07/06/24  0556 07/05/24  0500 07/04/24  0555 07/03/24  0523 07/02/24  1510   WBC AUTO x10*3/uL 7.2 7.7 7.3 8.2 9.7 9.6 7.7 8.5   HEMOGLOBIN g/dL 8.4* 7.7* 8.5* 7.5* 8.2* 7.8* 9.0* 9.0*   HEMATOCRIT % 27.0* 22.5* 23.3* 21.8* 23.7* 22.0* 24.8* 25.1*   PLATELETS AUTO x10*3/uL 197 234 225 164 136* 135* 110* 91*   MCV fL 93 85 81 84 82 82 81 79*     COAG:   Results from last 7 days   Lab Units 07/02/24  1510   INR  1.2*     Imaging  XR chest 2 views   Final Result   Tiny left apical pneumothorax seen on prior study is no longer   evident. The left pleural effusion and left lower lobe atelectasis is   again noted with left pleural effusion appearing a little smaller.        Platelike atelectasis right mid lung zone with small right effusion.        Removal of the left chest tube and nasogastric tube.        Signed by: Lacy Boyd 7/7/2024 9:29 AM   Dictation workstation:   [id number]      XR chest 1 view   Final Result   Right-sided chest tube has been removed. No right-sided pneumothorax.        Interval placement of a NG tube with distal tip in the gastric   remnant.        Stable mediastinal drainage catheter, left chest tube, and right IJ   vein catheter. Stable tiny left apical pneumothorax.        Persistent left pleural effusion with associated left basilar   atelectasis/infiltrate.        Cardiomegaly with mild stable central vascular congestion.        MACRO:   None        Signed by: Bruno Gomez 7/5/2024 8:07 AM   Dictation workstation:   [id number]      XR abdomen 1 view   Final Result   Nasogastric tube with the distal tip projecting over the left   midabdomen.             MACRO:   None        Signed by: Angi Odonnell 7/4/2024 2:37 PM   Dictation workstation:   [id number]      XR abdomen 1 view   Final Result   NG tube has been placed with distal tip in the mid stomach.        Previous bariatric surgery.        Stable left pleural effusion with left basilar atelectasis/infiltrate.         Right chest tube has been removed. Additional tubes and lines in   place as described.        MACRO:   None        Signed by: Bruno Gomez 7/5/2024 8:05 AM   Dictation workstation:   [id number]      XR chest 1 view   Final Result   1. Linear line at the left lung apex may represent a subtle left   apical pneumothorax. Short-term follow-up with dedicated upright   chest radiograph series is recommended.   2. Left basilar opacity likely small effusion with atelectasis.   3. Mild prominence of the pulmonary interstitium which may relate to   mild pulmonary edema.        MACRO:   Rl Morfin discussed the significance and urgency of this   critical finding epic chat with  CAROL VILLANUEVA on 7/4/2024 at 10:19   am.  (**-RCF-**) Findings:  See findings.        Signed by: Rl Morfin 7/4/2024 10:19 AM   Dictation workstation:   [id number]      XR chest 1 view   Final Result   Interval removal of ET tube and NG tube. Additional support devices   remain in place. Subtle bibasilar opacities.        MACRO:   None.        Signed by: Meredith Pulido 7/3/2024 8:29 AM   Dictation workstation:   [id number]      CT head wo IV contrast   Final Result   1. No acute intracranial abnormality identified.   2. Chronic white matter changes likely reflecting sequela of   small-vessel ischemic disease.   3. Chronic lacunar infarct in the right internal capsule/corona   radiata             If there is concern for superimposed acute ischemia or other   underlying abnormality then MRI may be performed in further   assessment.        MACRO:   None        Signed by: George Galdamez 7/3/2024 2:00 AM   Dictation workstation:   [id number]      XR chest 1 view   Final Result   Interval cardiothoracic surgery. Right-sided atelectasis.        MACRO:   none        Signed by: Mimi Garrido 7/2/2024 3:14 PM   Dictation workstation:   [id number]      Anesthesia Intraoperative Transesophageal Echocardiogram   Final Result      FL modified barium  swallow study    (Results Pending)        Medications  Scheduled medications  acetaminophen, 650 mg, oral, q6h  amLODIPine, 5 mg, oral, Daily  aspirin, 81 mg, oral, Daily  atorvastatin, 80 mg, oral, Nightly  enalapril, 20 mg, oral, BID  enoxaparin, 40 mg, subcutaneous, q24h  furosemide, 40 mg, intravenous, BID  insulin lispro, 0-15 Units, subcutaneous, q4h  isosorbide dinitrate, 20 mg, oral, TID  metoprolol tartrate, 50 mg, oral, BID  multivitamin with minerals, 1 tablet, nasogastric tube, Daily  potassium chloride, 20 mEq, nasogastric tube, BID      Continuous medications     PRN medications  PRN medications: dextrose **OR** glucagon, naloxone, [Held by provider] oxyCODONE, [Held by provider] oxyCODONE, oxygen, polyethylene glycol, sennosides-docusate sodium, traMADol    Assessment/Plan   Principal Problem:    Coronary artery disease involving native coronary artery of native heart  Active Problems:    Coronary artery disease involving native coronary artery of native heart with unstable angina pectoris (Multi)    Ms Rubi 70 y/o female with past medical history significant for CAD, HTN, HLD, TYLER, GERD, CVA on Plavix,  NIDDM II, Anemia. Work up for chest pain revealed  Multivessel disease including in stent re-stenosis of the circumflex and proximal LAD. She is admitted to ICU s/p Coronary Artery Bypass Graft x2 ; LIMA --> LAD, EVH RA --> RAMUS --> CX  EVH Right Radial Artery; EVH Right Saphenous Vein; CABG x 3 LIMA to LAD 26 ml/min, PI 3.6  Radial To Ramus, sequenced to Circ 26 ml/min,PI 1.6; Posterior Pericardial Window with Dr Kee on 7/2     Neuro  #H/O recent Right MCA CVA with residual Lt sided deficit (DAPT)  #Acute metabolic encephalopathy - suspect postoperative 2/2 CBP i/s/o recent large Right CVA: resolved  #Expected acute postop pain: controlled  CT head negative for acute/subacute infarcts, redemonstration of prior chronic Rt MCA infarct  - Serial neuro and pain assessments   - Analgesia: scheduled  Tylenol and PRN Ultram  - PT/OT/MITT  - Delirium precautions, sleep hygiene              -> Sitting up in bed HOB > 40              -> Lights, TV/music/conversation on; blinds open              -> Avoid narcotics if possible              -> Sleep/wake cycle  - Neurology signed off     CV  #H/O HTN, HLD  #MVD with in-stent restenosis s/p CABGx2, PPW  LVEF 65% pre/post  AV wires capped  - Cont EKG, NIBP  - ASA/statin/BB  - Continue home Amlodipine enalapril   - discontinue Isosorbide dinitrate Start Imdur 30 mg daily x 21 days for radial graft   - Metoprolol 50mg titrate as HD permits  - No Plavix per Dr Kee     Pulm  #TYLER  Postop CXR negative for acute process  CXR 7/4 with small L apical PTX- resolved  CXR 7/6 sm right effusion  - Wean FiO2 maintaining SpO2 >92%  - Acapella, IS     /Renal  #Preserved renal function (baseline Cr ~0.9)  - RFP daily, replace lytes prn  - Hold Lasix 40 mg IVP bid w/ scheduled potassium    Standing weight this afternoon 83.9 (admission weight 95.5 kg) Prior documented bed weights   - Strict I&Os  - Maintain K > 4, Mg > 2    GI  #GERD  #Known Dysphagia following CVA in 2023, currently failed bedside and MBS  - Speech rec bite sized, nectar thick liquids, MBS today  - Bowel regimen- senokot and miralax PRN  - Zofran PRN     Endo   #NIDDM2  A1c 6.1  -  BG 200s  - POCT BG, ISS q4  - Resume home metformin  - Hold home Jardiance for now     Heme  #Acute on chronic post op blood loss anemia (baseline Hgb ~9.0) and thrombocytopenia    Total infused: 5 units prbc, 1 unit Plts  #Postop RUE hematoma surrounding radial harvest site  #7/2 POD 0 bedside exploration of RUE hematoma involving harvest site  - CBC daily  - DVT ppx: SCDs, Lovenox  - Radial graft site care  -> Maintain gauze dressing to RUE exploration site              -> Notify ICU/CTS RAMSES if new or worsening firmness, bleeding, or loss of pulse ox or pulses              -> Elevation above heart if possible  - Wound care consulted      ID  Afebrile, no current indications of infection  MRSA negative  Periop abx  - Trend temp, WBCs     Dispo: Maintain SDU ;  Acute rehab 7/10    Discussed with Dr Kee    Time spent on the assessment of patient, gathering and interpreting data, review of medical record/patient history, personally reviewing radiographic imaging and formulation of this note. With greater than 50% spent in personal discussion with patient/ family.  Time: 45 minutes    Jordan BARBA, [employer] MSN Student     I was present with the student who participated in the documentation of this note. I personally saw and evaluated the patient and performed my own history and examination. I discussed the case with the student. I have reviewed, verified, and revised the note as necessary and agree with the content and plan as written by the student.   Urvashi Castaneda, APRN-CNP

## 2024-07-09 NOTE — PROGRESS NOTES
Rtia Rubi is a 69 y.o. female on day 7 of admission presenting with Coronary artery disease involving native coronary artery of native heart.     07/09/24 1136   Discharge Planning   Patient expects to be discharged to:  AR/ no AUTH needed     Plan: continues treatment s/p CABG, awaiting MBS. Accepted at [facility] and this is patient's Preference. Informed  AR ADOD 1-2 days.   Disposition:  AR/no AUTH needed  Barrier: MBS, cardio clearance  ADOD:  1-2 days      Oriana Rojas RN

## 2024-07-09 NOTE — RESEARCH NOTES
Artificial Intelligence Monitoring in Nursing (AIMS Nursing) Study    Principle Investigator - Dr. Alex Moe  Research Coordinator - Carmen Glover, RRT     Patient Name - Rita Rubi  Date - 7/9/2024 8:37 AM  Location - [address] ICU    Rita Rubi was approached by Carmen Glover RRT to talk about participating in the AIMS Nursing Study. The patient declined to participate in the study. Study protocol was followed and patient was given study contact information.     Carmen Glover, RRT

## 2024-07-09 NOTE — PROCEDURES
"Speech-Language Pathology      Modified Barium Swallow Study     Patient Name: Rita Rubi  MRN: [id number]  : 1955  Today's Date: 24  Time Calculation  Start Time: 1055  Stop Time: 1115  Time Calculation (min): 20 min       Recommendations:  BITE SIZED/CHOPPED DIET WITH NECTAR LIQUIDS ( NO MIXED CONSISTENCIES)  UPRIGHT AT 90 DEGREES FOR ALL PO INTAKE  SLOW RATE AND SMALL BOLUS SIZE  MEDICATION WHOLE IN PUREE CARRIER (CRUSH PRN)     Assessment/Impression:    Full detailed SLP/Radiologist Modified Barium Swallow study report can be found under Chart Review tab, Imaging tab and  titled \"FL Modified Barium Swallow Study\"      Pt. presenting with oropharyngeal dysphagia characterized by reduced bolus formation and mild pharyngeal weakness. Premature pharyngeal entry viewed with all liquids. Laryngeal penetration viewed with thin and nectar thickened liquids. Nectar liquids entered upper laryngeal vestibule inconsistently and cleared airway following swallows. Silent trace aspiration occurred with thin liquids only. No airway entry viewed with honey liquids or puree/solid boluses. Oral residue viewed with thin liquids and solids clearing with spontaneous reswallow. Vallecular and piriform coating noted with thin and nectar liquids. Adequate pharyngeal clearance achieved with honey liquids and puree/solid boluses. Modification of diet required to maximize swallowing safety. Did not perform esophageal follow through due to positioning challenges.    Plan:  Treatment/Interventions: Pharyngeal exercises, Oral motor exercises, Patient/family education, Bolus trials  SLP Plan: Skilled SLP warranted  SLP Frequency: 3x per week  Duration: 30 days    Discussed POC: Patient  Discussed Risks/Benefits: Yes  Patient/Caregiver Agreeable: Yes    Pain:   Rating 0-10: 0  Location: n/a       Goals:  Pt. to tolerate least restrictive diet without overt s/s aspiration    Education:   Pt. educated on results of MBS study, " recommended diet and recommended safe swallow strategies.  Verbal understanding given

## 2024-07-09 NOTE — PROGRESS NOTES
Physical Therapy    Physical Therapy Treatment    Patient Name: Rita Rubi  MRN: [id number]  Today's Date: 7/9/2024  Time Calculation  Start Time: 1449  Stop Time: 1515  Time Calculation (min): 26 min    Assessment/Plan   PT Assessment  PT Assessment Results: Decreased strength, Decreased mobility, Impaired judgement, Decreased safety awareness, Obesity, Decreased endurance, Impaired balance  Rehab Prognosis: Good  Barriers to Discharge: Decreased balance, decreased endurance, need for assist for OOB mobility per report.  Evaluation/Treatment Tolerance: Patient limited by fatigue  Medical Staff Made Aware: Yes  End of Session Communication: Bedside nurse  Assessment Comment: PT tx completed today. Much improved cognition and ability to follow conversation and cuing. Increased fatigue, reports already up and ambulating with nursing this date. Declining OOB mobility, but willing to perform therex in sitting. Needing continued PT for addressing functional deficits.   End of Session Patient Position: Up in chair, Alarm off, not on at start of session (Notified nursing, ok'd to be without alarm as too weak to attempt stance without staff.)  PT Plan  Inpatient/Swing Bed or Outpatient: Inpatient  PT Plan  Treatment/Interventions: Bed mobility, Transfer training, Gait training, Balance training, Neuromuscular re-education, Strengthening, Endurance training, Therapeutic exercise, Home exercise program, Therapeutic activity, Positioning, Postural re-education  PT Plan: Ongoing PT  PT Frequency: 5 times per week  PT Discharge Recommendations: Moderate intensity level of continued care  Equipment Recommended upon Discharge: Wheeled walker  PT Recommended Transfer Status: Assist x2  PT - OK to Discharge: Yes (as per PT POC)      General Visit Information:   PT  Visit  PT Received On: 07/09/24  Response to Previous Treatment: Patient with no complaints from previous session.  General  Family/Caregiver Present: No  Patient Position  Received: Alarm off, not on at start of session, Up in chair  Preferred Learning Style: auditory, verbal  General Comment: Pt up in chair when PT entered, pleasant and much more cognitively present this date. Fatigued, reports already up and ambulating this date.    Subjective   Precautions:  Precautions  Medical Precautions: Fall precautions  Post-Surgical Precautions: Move in the Tube    Objective   Pain:  Pain Assessment  Pain Assessment: 0-10  0-10 (Numeric) Pain Score: 0 - No pain  Cognition:  Cognition  Overall Cognitive Status: Within Functional Limits (Pt much more cognitively present, able to carry full conversation and respond logically throughout.)  Coordination:  Movements are Fluid and Coordinated: No  Upper Body Coordination: Limited strength and AROM of BUE  Lower Body Coordination: Limited mobility of BLE due to decreased strength  Coordination Comment: Needing hands on assist for therex, but improved mobility of BUE as compared to last session  Postural Control:  Postural Control  Postural Control: Impaired  Trunk Control: Limited trunk control when attempting to sit chair forward  Static Sitting Balance  Static Sitting-Balance Support: Feet supported, No upper extremity supported  Static Sitting-Level of Assistance: Close supervision  Static Sitting-Comment/Number of Minutes: Noted slight retropulsion at trunk, but pt reports aware and able to self correct  Dynamic Sitting Balance  Dynamic Sitting-Balance Support: Feet supported, No upper extremity supported  Dynamic Sitting-Balance: Lateral lean, Trunk control activities  Dynamic Sitting-Comments: Min Ax1    Activity Tolerance:  Activity Tolerance  Endurance: Tolerates 10 - 20 min exercise with multiple rests  Treatments:  Therapeutic Exercise  Therapeutic Exercise Performed: Yes  Therapeutic Exercise Activity 1: Educated, instructed, and cued on forward punches, elbow flex/ext, grasp/release, toe raises, glute sets, Hip ABD, Hip ADD, PT assisted  "hip/knee flexion and \"push away\" all x10 B extremities    Bed Mobility  Bed Mobility: No (Pt encountered in chair, requesting staying in same)    Ambulation/Gait Training  Ambulation/Gait Training Performed: No (Pt fatigued, requesting chair therex)  Transfers  Transfer: No    Stairs  Stairs: No    Other Activity  Other Activity Performed: Yes  Other Activity 1: Increased education to pt about plan of care, progression with therapy, HEP/Precautions packet, and participation.  Increased dynamic trunk stabilization both in sitting and standing.    Outcome Measures:  [facility] Basic Mobility  Turning from your back to your side while in a flat bed without using bedrails: A lot  Moving from lying on your back to sitting on the side of a flat bed without using bedrails: A lot  Moving to and from bed to chair (including a wheelchair): A lot  Standing up from a chair using your arms (e.g. wheelchair or bedside chair): A lot  To walk in hospital room: A lot  Climbing 3-5 steps with railing: Total  Basic Mobility - Total Score: 11    FSS-ICU  Ambulation: Walks <50 feet with any assistance x1 or walks any distance with assistance x2 people  Rolling: Maximal assistance (performs 25% - 49% of task)  Sitting: Maximal assistance (performs 25% - 49% of task)  Transfer Sit-to-Stand: Total assistance (performs 25% or requires another person)  Transfer Supine-to-Sit: Total assistance (performs 25% or requires another person)  Total Score: 7      E = Exercise and Early Mobility  ICU Mobility Scale: Transferring bed to chair    Education Documentation  Handouts, taught by Gato Bernardo PT at 7/9/2024  5:07 PM.  Learner: Patient  Readiness: Acceptance  Method: Demonstration, Explanation  Response: Needs Reinforcement  Comment: Much improved mentation at this time, increased focus on Precautions and progression with therapy.    Precautions, taught by Gato Bernardo PT at 7/9/2024  5:07 PM.  Learner: Patient  Readiness: " Acceptance  Method: Demonstration, Explanation  Response: Needs Reinforcement  Comment: Much improved mentation at this time, increased focus on Precautions and progression with therapy.    Body Mechanics, taught by Gato Bernardo, PT at 7/9/2024  5:07 PM.  Learner: Patient  Readiness: Acceptance  Method: Demonstration, Explanation  Response: Needs Reinforcement  Comment: Much improved mentation at this time, increased focus on Precautions and progression with therapy.    Mobility Training, taught by Gato Bernardo, PT at 7/9/2024  5:07 PM.  Learner: Patient  Readiness: Acceptance  Method: Demonstration, Explanation  Response: Needs Reinforcement  Comment: Much improved mentation at this time, increased focus on Precautions and progression with therapy.    Education Comments  No comments found.        OP EDUCATION:       Encounter Problems       Encounter Problems (Active)       Balance       STG - Maintains static sitting balance with upper extremity support At Min Ax1, safely, without LOB, device PRN  (Progressing)       Start:  07/07/24    Expected End:  07/21/24       INTERVENTIONS:  1. Practice sitting on the edge of a bed/mat with minimal support.  2. Educate patient about maintining total hip precautions while maintaining balance.  3. Educate patient about pressure relief.  4. Educate patient about use of assistive device.         STG - Maintains dynamic sitting balance with upper extremity support At Mod Ax1, safely, without LOB, device PRN  (Progressing)       Start:  07/07/24    Expected End:  07/21/24       INTERVENTIONS:  1. Practice sitting on the edge of a bed/mat with minimal support.  2. Educate patient about maintining total hip precautions while maintaining balance.  3. Educate patient about pressure relief.  4. Educate patient about use of assistive device.            Mobility       Endurance - Pt to tolerate >/= 20 minutes therex, theract, and/or NMR with </= 5 minutes of rest breaks   (Progressing)       Start:  07/07/24    Expected End:  07/21/24               Mobility       STG - Patient will ambulate At Mod Ax1 using Wheeled walker for 10'  without LOB or gross gait deviations  (Progressing)       Start:  07/08/24    Expected End:  07/22/24               PT Transfers       STG - Patient will perform bed mobility At Min Ax1, safely, without LOB, device PRN  (Progressing)       Start:  07/07/24    Expected End:  07/21/24            STG - Patient will roll At Min Ax1, safely, without LOB, device PRN  (Progressing)       Start:  07/07/24    Expected End:  07/21/24            STG - Patient will transfer sit to and from stand At Max Ax1, safely, without LOB, device PRN  (Progressing)       Start:  07/07/24    Expected End:  07/21/24               Pain - Adult          Safety       Pt will verbalize and apply safety awareness and precautions 100% of time throughout entire session   (Progressing)       Start:  07/07/24    Expected End:  07/21/24

## 2024-07-10 VITALS
HEIGHT: 66 IN | SYSTOLIC BLOOD PRESSURE: 122 MMHG | DIASTOLIC BLOOD PRESSURE: 68 MMHG | WEIGHT: 192.2 LBS | BODY MASS INDEX: 30.89 KG/M2 | TEMPERATURE: 98.2 F | RESPIRATION RATE: 24 BRPM | HEART RATE: 78 BPM | OXYGEN SATURATION: 97 %

## 2024-07-10 LAB
ALBUMIN SERPL BCP-MCNC: 2.9 G/DL (ref 3.4–5)
ANION GAP SERPL CALC-SCNC: 10 MMOL/L (ref 10–20)
BUN SERPL-MCNC: 24 MG/DL (ref 6–23)
CALCIUM SERPL-MCNC: 8.9 MG/DL (ref 8.6–10.3)
CHLORIDE SERPL-SCNC: 104 MMOL/L (ref 98–107)
CO2 SERPL-SCNC: 27 MMOL/L (ref 21–32)
CREAT SERPL-MCNC: 0.71 MG/DL (ref 0.5–1.05)
EGFRCR SERPLBLD CKD-EPI 2021: >90 ML/MIN/1.73M*2
ERYTHROCYTE [DISTWIDTH] IN BLOOD BY AUTOMATED COUNT: 17 % (ref 11.5–14.5)
GLUCOSE BLD MANUAL STRIP-MCNC: 172 MG/DL (ref 74–99)
GLUCOSE BLD MANUAL STRIP-MCNC: 215 MG/DL (ref 74–99)
GLUCOSE SERPL-MCNC: 169 MG/DL (ref 74–99)
HCT VFR BLD AUTO: 24.4 % (ref 36–46)
HGB BLD-MCNC: 8.3 G/DL (ref 12–16)
HOLD SPECIMEN: NORMAL
MAGNESIUM SERPL-MCNC: 2 MG/DL (ref 1.6–2.4)
MCH RBC QN AUTO: 29 PG (ref 26–34)
MCHC RBC AUTO-ENTMCNC: 34 G/DL (ref 32–36)
MCV RBC AUTO: 85 FL (ref 80–100)
NRBC BLD-RTO: 0 /100 WBCS (ref 0–0)
PHOSPHATE SERPL-MCNC: 2.7 MG/DL (ref 2.5–4.9)
PLATELET # BLD AUTO: 291 X10*3/UL (ref 150–450)
POTASSIUM SERPL-SCNC: 4 MMOL/L (ref 3.5–5.3)
RBC # BLD AUTO: 2.86 X10*6/UL (ref 4–5.2)
SODIUM SERPL-SCNC: 137 MMOL/L (ref 136–145)
WBC # BLD AUTO: 7.4 X10*3/UL (ref 4.4–11.3)

## 2024-07-10 PROCEDURE — 2500000001 HC RX 250 WO HCPCS SELF ADMINISTERED DRUGS (ALT 637 FOR MEDICARE OP)

## 2024-07-10 PROCEDURE — 92526 ORAL FUNCTION THERAPY: CPT | Mod: GN

## 2024-07-10 PROCEDURE — 85027 COMPLETE CBC AUTOMATED: CPT | Performed by: NURSE PRACTITIONER

## 2024-07-10 PROCEDURE — 2500000004 HC RX 250 GENERAL PHARMACY W/ HCPCS (ALT 636 FOR OP/ED): Performed by: NURSE PRACTITIONER

## 2024-07-10 PROCEDURE — 36415 COLL VENOUS BLD VENIPUNCTURE: CPT | Performed by: NURSE PRACTITIONER

## 2024-07-10 PROCEDURE — 2500000001 HC RX 250 WO HCPCS SELF ADMINISTERED DRUGS (ALT 637 FOR MEDICARE OP): Performed by: NURSE PRACTITIONER

## 2024-07-10 PROCEDURE — 2500000002 HC RX 250 W HCPCS SELF ADMINISTERED DRUGS (ALT 637 FOR MEDICARE OP, ALT 636 FOR OP/ED): Performed by: NURSE PRACTITIONER

## 2024-07-10 PROCEDURE — 83735 ASSAY OF MAGNESIUM: CPT | Performed by: NURSE PRACTITIONER

## 2024-07-10 PROCEDURE — 94668 MNPJ CHEST WALL SBSQ: CPT

## 2024-07-10 PROCEDURE — 99232 SBSQ HOSP IP/OBS MODERATE 35: CPT | Performed by: NURSE PRACTITIONER

## 2024-07-10 PROCEDURE — 94762 N-INVAS EAR/PLS OXIMTRY CONT: CPT | Performed by: NURSE PRACTITIONER

## 2024-07-10 PROCEDURE — 80069 RENAL FUNCTION PANEL: CPT | Performed by: NURSE PRACTITIONER

## 2024-07-10 PROCEDURE — 82947 ASSAY GLUCOSE BLOOD QUANT: CPT

## 2024-07-10 RX ORDER — TRAMADOL HYDROCHLORIDE 50 MG/1
50 TABLET ORAL EVERY 8 HOURS PRN
Qty: 10 TABLET | Refills: 0 | Status: SHIPPED | OUTPATIENT
Start: 2024-07-10 | End: 2024-07-13

## 2024-07-10 RX ORDER — FUROSEMIDE 20 MG/1
20 TABLET ORAL DAILY
Status: DISCONTINUED | OUTPATIENT
Start: 2024-07-10 | End: 2024-07-10 | Stop reason: HOSPADM

## 2024-07-10 RX ORDER — FUROSEMIDE 20 MG/1
20 TABLET ORAL DAILY
Qty: 7 TABLET | Refills: 0 | Status: SHIPPED | OUTPATIENT
Start: 2024-07-10 | End: 2024-07-17

## 2024-07-10 RX ORDER — METOPROLOL TARTRATE 50 MG/1
50 TABLET ORAL 2 TIMES DAILY
Qty: 60 TABLET | Refills: 0 | Status: SHIPPED | OUTPATIENT
Start: 2024-07-10 | End: 2024-08-09

## 2024-07-10 RX ORDER — MULTIVIT-MIN/IRON FUM/FOLIC AC 7.5 MG-4
1 TABLET ORAL DAILY
Qty: 30 TABLET | Refills: 0 | Status: SHIPPED | OUTPATIENT
Start: 2024-07-11 | End: 2024-08-10

## 2024-07-10 RX ORDER — POLYETHYLENE GLYCOL 3350 17 G/17G
17 POWDER, FOR SOLUTION ORAL DAILY PRN
Qty: 30 PACKET | Refills: 0 | Status: SHIPPED | OUTPATIENT
Start: 2024-07-10 | End: 2024-08-09

## 2024-07-10 RX ORDER — ISOSORBIDE MONONITRATE 30 MG/1
30 TABLET, EXTENDED RELEASE ORAL DAILY
Qty: 20 TABLET | Refills: 0 | Status: SHIPPED | OUTPATIENT
Start: 2024-07-11 | End: 2024-07-31

## 2024-07-10 RX ORDER — POTASSIUM CHLORIDE 20 MEQ/1
20 TABLET, EXTENDED RELEASE ORAL DAILY
Qty: 7 TABLET | Refills: 0 | Status: SHIPPED | OUTPATIENT
Start: 2024-07-10 | End: 2024-07-17

## 2024-07-10 ASSESSMENT — PAIN - FUNCTIONAL ASSESSMENT
PAIN_FUNCTIONAL_ASSESSMENT: 0-10

## 2024-07-10 ASSESSMENT — PAIN SCALES - GENERAL
PAINLEVEL_OUTOF10: 0 - NO PAIN

## 2024-07-10 NOTE — PROGRESS NOTES
"Rita Rubi is a 69 y.o. female on day 8 of admission presenting with Coronary artery disease involving native coronary artery of native heart.    Subjective   JARON reported  Post op pain controlled  Denies dizziness, sob, cp, n/v  + BM       Objective     Physical Exam  Constitutional:       Appearance: Normal appearance.   Cardiovascular:      Rate and Rhythm: Normal rate and regular rhythm.      Pulses: Normal pulses.   Pulmonary:      Effort: Pulmonary effort is normal. No respiratory distress.      Breath sounds: No wheezing.   Abdominal:      General: There is no distension.      Palpations: Abdomen is soft.      Tenderness: There is no abdominal tenderness.   Skin:     General: Skin is warm and dry.      Capillary Refill: Capillary refill takes less than 2 seconds.      Comments: Sternotomy incision well approximated stable to palpation and cough.   Right Radial artery incision well approximated, ecchymosis stable, no drainage noted.    Neurological:      General: No focal deficit present.      Mental Status: She is alert and oriented to person, place, and time. Mental status is at baseline.   Psychiatric:         Mood and Affect: Mood normal.         Last Recorded Vitals  Blood pressure 123/62, pulse 78, temperature 36.4 °C (97.5 °F), temperature source Temporal, resp. rate 19, height 1.689 m (5' 6.5\"), weight 95.7 kg (210 lb 15.7 oz), SpO2 93%.  Intake/Output last 3 Shifts:  I/O last 3 completed shifts:  In: 807.2 (9.6 mL/kg) [P.O.:320; I.V.:187.2 (2.2 mL/kg); Blood:300]  Out: 300 (3.6 mL/kg) [Urine:300 (0.1 mL/kg/hr)]  Weight: 83.9 kg     Relevant Results      Assessment/Plan   Principal Problem:    Coronary artery disease involving native coronary artery of native heart  Active Problems:    Coronary artery disease involving native coronary artery of native heart with unstable angina pectoris (Multi)  Ms Rubi 68 y/o female with past medical history significant for CAD, HTN, HLD, TYLER, GERD, CVA on Plavix,  " NIDDM II, Anemia. Work up for chest pain revealed  Multivessel disease including in stent re-stenosis of the circumflex and proximal LAD. She is admitted to ICU s/p Coronary Artery Bypass Graft x2 ; LIMA --> LAD, EVH RA --> RAMUS --> CX  EVH Right Radial Artery; EVH Right Saphenous Vein; CABG x 3 LIMA to LAD 26 ml/min, PI 3.6  Radial To Ramus, sequenced to Circ 26 ml/min,PI 1.6; Posterior Pericardial Window with Dr Kee on 7/2     Neuro  H/O recent Right MCA CVA with residual Lt sided deficit (DAPT)  Acute metabolic encephalopathy - suspect postoperative 2/2 CBP i/s/o recent large Right CVA: resolved  Expected acute postop pain: controlled  CT head - 7/3 -  negative for acute/subacute infarcts, redemonstration of prior chronic Rt MCA infarct  - Serial neuro and pain assessments   - Analgesia: scheduled Tylenol and PRN Ultram  - PT/OT/MITT     CV  H/O HTN, HLD  MVD with in-stent restenosis s/p CABGx2, PPW  LVEF 65% pre/post  AV wires capped  - Cont EKG, NIBP  - ASA/statin/BB  - Continue home Amlodipine enalapril   - Imdur 30 mg daily x 21 days for radial graft   - Metoprolol 50mg titrate as HD permits  - No Plavix per Dr Kee  - Will cut wires today     Pulm  TYLER  Postop CXR negative for acute process  CXR 7/4 with small L apical PTX- resolved  CXR 7/6 sm right effusion  - Wean FiO2 maintaining SpO2 >92%  - Acapella, IS     /Renal  Preserved renal function (baseline Cr ~0.9)  - RFP daily, replace lytes prn  - lasix today (20mg daily x 14 days)  - Strict I&Os     GI  GERD  Known Dysphagia following CVA in 2023  - Speech recs per MBS - BITE SIZED DIET WITH  NECTAR/MILDLY THICKENED LIQUIDS- NO MIXED CONSISTENCIES; SLOW RATE AND SMALL BOLUS SIZE; UPRIGHT AT 90 DEGREES FOR ALL PO; MEDICATION IN PUREE CARRIER (CRUSH PRN)  - Bowel regimen- senokot and miralax PRN  - Zofran PRN     Endo   Hx: NIDDM2  A1c 6.1  - POCT BG AC and HS  - Continue home metformin  - Hold home Jardiance for now and resume when appropriate      Heme  Acute on chronic post op blood loss anemia (baseline Hgb ~9.0) and thrombocytopenia    Total infused: 5 units prbc, 1 unit Plts  Postop RUE hematoma surrounding radial harvest site  7/2 POD 0 bedside exploration of RUE hematoma involving harvest site  - CBC daily  - DVT ppx: SCDs, Lovenox  - Radial graft site care - Maintain gauze dressing to RUE exploration site  - Wound care consulted     ID  Afebrile, no current indications of infection  MRSA negative  Periop abx  - Trend temp, WBCs     Dispo: Angela KWON to  Acute rehab later today          I spent 60 minutes in the professional and overall care of this patient.      Lele Iraheta, APRN-CNP

## 2024-07-10 NOTE — PROGRESS NOTES
Physical Therapy                 Therapy Communication Note    Patient Name: Rita Rubi  MRN: [id number]  Today's Date: 7/10/2024     Discipline: Physical Therapy    Missed Visit Reason: Missed Visit Reason: Other (Comment) (Per RN: pt awaiting discharge this afternoon, no acute PT tx needed at this time.)    Missed Time: Attempt

## 2024-07-10 NOTE — DISCHARGE SUMMARY
Discharge Diagnosis  Coronary artery disease involving native coronary artery of native heart    Issues Requiring Follow-Up  None      Test Results Pending At Discharge  Pending Labs       No current pending labs.            Hospital Course   Ms Rubi 68 y/o female with past medical history significant for CAD, HTN, HLD, TYLER, GERD, CVA on Plavix,  NIDDM II, Anemia. Work up for chest pain revealed  Multivessel disease including in stent re-stenosis of the circumflex and proximal LAD. She is admitted to ICU s/p Coronary Artery Bypass Graft x2 ; LIMA --> LAD, EVH RA --> RAMUS --> CX  EVH Right Radial Artery; EVH Right Saphenous Vein; CABG x 3 LIMA to LAD 26 ml/min, PI 3.6  Radial To Ramus, sequenced to Circ 26 ml/min,PI 1.6; Posterior Pericardial Window with Dr Kee on 7/2     Neuro  H/O recent Right MCA CVA with residual Lt sided deficit (DAPT)  Acute metabolic encephalopathy - suspect postoperative 2/2 CBP i/s/o recent large Right CVA: resolved  Expected acute postop pain: controlled  CT head - 7/3 -  negative for acute/subacute infarcts, redemonstration of prior chronic Rt MCA infarct  - Serial neuro and pain assessments   - Analgesia: scheduled Tylenol and PRN Ultram  - PT/OT/MITT     CV  H/O HTN, HLD  MVD with in-stent restenosis s/p CABGx2, PPW  LVEF 65% pre/post  AV wires capped  - Cont EKG, NIBP  - ASA/statin/BB  - Continue home Amlodipine enalapril   - Imdur 30 mg daily x 21 days for radial graft   - Metoprolol 50mg titrate as HD permits  - No Plavix per Dr Kee  - Will cut wires today     Pulm  TYLER  Postop CXR negative for acute process  CXR 7/4 with small L apical PTX- resolved  CXR 7/6 sm right effusion  - Wean FiO2 maintaining SpO2 >92%  - Acapella, IS     /Renal  Preserved renal function (baseline Cr ~0.9)  - RFP daily, replace lytes prn  - lasix today (20mg daily x 14 days)  - Strict I&Os     GI  GERD  Known Dysphagia following CVA in 2023  - Speech recs per MBS - BITE SIZED DIET WITH  NECTAR/MILDLY  THICKENED LIQUIDS- NO MIXED CONSISTENCIES; SLOW RATE AND SMALL BOLUS SIZE; UPRIGHT AT 90 DEGREES FOR ALL PO; MEDICATION IN PUREE CARRIER (CRUSH PRN)  - Bowel regimen- senokot and miralax PRN  - Zofran PRN     Endo   Hx: NIDDM2  A1c 6.1  - POCT BG AC and HS  - Continue home metformin  - Hold home Jardiance for now and resume when appropriate     Heme  Acute on chronic post op blood loss anemia (baseline Hgb ~9.0) and thrombocytopenia    Total infused: 5 units prbc, 1 unit Plts  Postop RUE hematoma surrounding radial harvest site  7/2 POD 0 bedside exploration of RUE hematoma involving harvest site  - CBC daily  - DVT ppx: SCDs, Lovenox  - Radial graft site care - Maintain gauze dressing to RUE exploration site  - Wound care consulted     ID  Afebrile, no current indications of infection  MRSA negative  Periop abx  - Trend temp, WBCs     Dispo: Likey DC to  Acute rehab later today    Home Medications     Medication List      START taking these medications     metoprolol tartrate 50 mg tablet; Commonly known as: Lopressor; Take 1   tablet by mouth 2 times a day.   multivitamin with minerals tablet; Take 1 tablet by mouth once daily.;   Start taking on: July 11, 2024   polyethylene glycol 17 gram packet; Commonly known as: Glycolax,   Miralax; Take 17 g by mouth once daily as needed (constipation).   traMADol 50 mg tablet; Commonly known as: Ultram; Take 1 tablet (50 mg)   by mouth every 8 hours if needed for moderate pain (4 - 6) or severe pain   (7 - 10) for up to 3 days.     CHANGE how you take these medications     fluticasone 50 mcg/actuation nasal spray; Commonly known as: Flonase;   1-2 sprays QD; What changed: how much to take, how to take this, when to   take this, reasons to take this   furosemide 20 mg tablet; Commonly known as: Lasix; Take 1 tablet (20 mg)   by mouth once daily for 7 days.   isosorbide mononitrate ER 30 mg 24 hr tablet; Commonly known as: Imdur;   Take 1 tablet (30 mg) by mouth once daily  for 20 doses. Do not crush or   chew.; Start taking on: July 11, 2024; What changed: how much to take,   additional instructions   potassium chloride CR 20 mEq ER tablet; Commonly known as: Klor-Con M20;   Take 1 tablet (20 mEq) by mouth once daily for 7 days. Do not crush or   chew. Take with Lasix     CONTINUE taking these medications     alpha tocopherol 268 mg (400 unit) capsule; Commonly known as: Vitamin E   amLODIPine 5 mg tablet; Commonly known as: Norvasc; TAKE 1 TABLET BY   MOUTH EVERY DAY   aspirin 81 mg EC tablet; Take 1 tablet (81 mg) by mouth once daily.   atorvastatin 80 mg tablet; Commonly known as: Lipitor; Take 1 tablet (80   mg) by mouth once daily at bedtime.   cyanocobalamin 250 mcg tablet; Commonly known as: Vitamin B-12   docusate sodium 100 mg capsule; Commonly known as: Colace; Take 1   capsule (100 mg) by mouth 2 times a day as needed for constipation.   empagliflozin 10 mg; Commonly known as: Jardiance; Take 1 tablet (10 mg)   by mouth once daily.   enalapril 20 mg tablet; Commonly known as: Vasotec; Take 1 tablet (20   mg) by mouth 2 times a day.   metFORMIN 500 mg tablet; Commonly known as: Glucophage; TAKE 1 TABLET BY   MOUTH TWICE A DAY WITH MEALS   ONETOUCH ULTRASOFT LANCETS MISC   OPTISOURCE ORAL     STOP taking these medications     chlorhexidine 0.12 % solution; Commonly known as: Peridex   clopidogrel 75 mg tablet; Commonly known as: Plavix   metoprolol succinate XL 25 mg 24 hr tablet; Commonly known as: Toprol-XL   nitroglycerin 0.4 mg SL tablet; Commonly known as: Nitrostat       Outpatient Follow-Up  Future Appointments   Date Time Provider Department Center   7/25/2024  9:00 AM Pretty Link MD [id number] [facility]   8/28/2024  2:00 PM Franci Kee MD [id number] [facility]   9/3/2024  9:00 AM Shaquille Nguyen MD [id number] [facility]       Lele Iraheta, APRN-CNP

## 2024-07-10 NOTE — PROGRESS NOTES
07/10/24 0940   Discharge Planning   Patient expects to be discharged to: [facility] [phone]     Rita Rubi is a 69 y.o. female on day 8 of admission presenting with Coronary artery disease involving native coronary artery of native heart.    Patient should be ready this afternoon for discharge. Updated care team she will go to [facility]. Will arrange for transportation when orders are placed.    Oriana Rojas RN

## 2024-07-10 NOTE — CARE PLAN
Problem: Discharge Planning  Goal: Discharge to home or other facility with appropriate resources  Outcome: Met     Problem: Fall/Injury  Goal: Not fall by end of shift  Outcome: Met     Problem: Pain  Goal: Walks with improved pain control throughout the shift  Outcome: Met     Problem: Respiratory  Goal: No signs of respiratory distress (eg. Use of accessory muscles. Peds grunting)  Outcome: Met     Problem: Skin  Goal: Participates in plan/prevention/treatment measures  Outcome: Met  Goal: Prevent/manage excess moisture  Outcome: Met  Goal: Prevent/minimize sheer/friction injuries  Outcome: Met  Goal: Promote skin healing  Outcome: Met

## 2024-07-10 NOTE — PROCEDURES
Epicardial pacer wire cutting    Sites cleaned with chlorhexidine, all sutures cut at chest tube sites and pacer wires.  Cut external atrial and ventricular epicardial pacer wires below skin level.  Patient educated wires are MRI safe and if drainage or wires poking through skin to call MD office for removal.     Bed rest and vitals x 1 hour.

## 2024-07-10 NOTE — CARE PLAN
Transport is  at  3 pm  [place]  called brother  and let him know  Nurse made  aware, given transport time    Malgorzata Beavers, MSW, LSW

## 2024-07-10 NOTE — PROGRESS NOTES
Occupational Therapy                 Therapy Communication Note    Patient Name: Rita Rubi  MRN: [id number]  Today's Date: 7/10/2024     Discipline: Occupational Therapy    Missed Visit Reason: Missed Visit Reason:  (Per RN: pt awaiting discharge this afternoon, no acute OT tx needed at this time.)    Missed Time: Attempt

## 2024-07-10 NOTE — CARE PLAN
Problem: Fall/Injury  Goal: Not fall by end of shift  Outcome: Progressing     Problem: Pain  Goal: Walks with improved pain control throughout the shift  Outcome: Progressing     Problem: Respiratory  Goal: No signs of respiratory distress (eg. Use of accessory muscles. Peds grunting)  Outcome: Progressing     Problem: Skin  Goal: Prevent/manage excess moisture  Outcome: Progressing  Flowsheets (Taken 7/10/2024 0037)  Prevent/manage excess moisture:   Moisturize dry skin   Cleanse incontinence/protect with barrier cream  Goal: Prevent/minimize sheer/friction injuries  Outcome: Progressing  Flowsheets (Taken 7/10/2024 0037)  Prevent/minimize sheer/friction injuries:   Turn/reposition every 2 hours/use positioning/transfer devices   HOB 30 degrees or less    The clinical goals for the shift include Patient will be heamodynamically stable during shift    Over the shift, patient slept well. Pain under good control. Stayed in normal sinus rhythm.

## 2024-07-10 NOTE — PROGRESS NOTES
Speech-Language Pathology    Inpatient  Speech-Language Pathology Dysphagia Treatment    Patient Name: Rita Rubi  MRN: [id number]  : 1955  Today's Date: 07/10/24   Time Calculation  Start Time: 1112  Stop Time: 1140  Time Calculation (min): 28 min        Subjective:     Patient sleepy yet arousable.    Objective:  Goals:   Patient will tolerate prescribed diet without overt s/s aspiration.     Therapeutic Swallow Intervention : PO Trials    Treatment Results/ Swallow Comments: Patient seen to assess po tolerance. Upon arriving to room, noted regular unthickened water with straw at bedside despite diet order for thickened liquids. Discussed with RN need to thicken all liquids due to silent aspiration identified with thin liquids during MBSS on 24. Diet ordered confirmed in Epic with nectar/mildly thickened liquids indicated.     Patient awakened for session. Wet quality to voice noted. Cued patient to cough and reswallow, which was effective at clearing voice. Reviewed results of MBSS with rationale provided for thickening all liquids, including water. Discussed POC with potential for implementation of [place] Water Protocol at next level of care. Candidacy to be determined by SLP at acute rehab facility.     [place] liquids self regulated via cup with cues given by SLP for slow rate and small bolus size. Bolus formation mildly slow with adequate oral clearance achieved. No overt s/s aspiration detected with boluses consumed. Attempted assessment for advancement of solids, however trials declined by patient at time of visit, thus unable to determine safety.    Assessment:  SLP TX Intervention Outcome: Making Progress Towards Goals    Patient with adequate tolerance of nectar/mildly thickened liquids. Modification of diet required to maintain swallowing safety. Will need ongoing therapy to determine safety with oral diet and for introduction of oropharyngeal exercises. Patient likely to require repeat  instrumental assessment prior to advancement of liquids to ensure safety 2/2 silent aspiration identified on MBSS. Patient receptive to SLP guidance and recommendations and remains a good candidate for ongoing therapy.    Plan:    CONTINUE BITE SIZED DIET WITH NECTAR/MILDLY THICK LIQUIDS  SLOW RATE AND SMALL BOLUS SIZE  SLP FOR DYSPHAGIA MANAGEMENT    SLP TX Plan: Continue Plan of Care  SLP Plan: Skilled SLP  SLP Frequency: 3x per week  Duration: Current admission  SLP Discharge Recommendations: Continue skilled speech therapy services post discharge  Discussed POC: Patient  Discussed Risks/Benefits: Yes  Patient/Caregiver Agreeable: Yes

## 2024-07-11 ENCOUNTER — HOSPITAL ENCOUNTER (OUTPATIENT)
Dept: RADIOLOGY | Facility: CLINIC | Age: 69
End: 2024-07-11
Payer: MEDICARE

## 2024-07-11 DIAGNOSIS — N39.0 UTI (URINARY TRACT INFECTION): ICD-10-CM

## 2024-07-11 DIAGNOSIS — Z01.818 PREOP TESTING: ICD-10-CM

## 2024-07-11 DIAGNOSIS — R69 DIAGNOSIS UNKNOWN: ICD-10-CM

## 2024-07-12 DIAGNOSIS — K59.00 CONSTIPATION, UNSPECIFIED CONSTIPATION TYPE: ICD-10-CM

## 2024-07-12 LAB
ACT BLD: 133 SEC (ref 96–152)
ACT BLD: 550 SEC (ref 96–152)
ACT BLD: 584 SEC (ref 96–152)
ACT BLD: 677 SEC (ref 96–152)
ACT BLD: 750 SEC (ref 96–152)
ACT BLD: 907 SEC (ref 96–152)
ACT BLD: 91 SEC (ref 96–152)

## 2024-07-12 RX ORDER — DOCUSATE SODIUM 100 MG/1
CAPSULE, LIQUID FILLED ORAL
Qty: 60 CAPSULE | Refills: 3 | Status: SHIPPED | OUTPATIENT
Start: 2024-07-12

## 2024-07-16 ENCOUNTER — LAB REQUISITION (OUTPATIENT)
Dept: LAB | Facility: HOSPITAL | Age: 69
End: 2024-07-16
Payer: COMMERCIAL

## 2024-07-16 DIAGNOSIS — Z51.89 ENCOUNTER FOR OTHER SPECIFIED AFTERCARE: ICD-10-CM

## 2024-07-16 LAB
ALBUMIN SERPL BCP-MCNC: 3.2 G/DL (ref 3.4–5)
ALP SERPL-CCNC: 52 U/L (ref 33–136)
ALT SERPL W P-5'-P-CCNC: 40 U/L (ref 7–45)
ANION GAP SERPL CALC-SCNC: 12 MMOL/L (ref 10–20)
ANION GAP SERPL CALC-SCNC: 12 MMOL/L (ref 10–20)
AST SERPL W P-5'-P-CCNC: 28 U/L (ref 9–39)
BILIRUB SERPL-MCNC: 0.9 MG/DL (ref 0–1.2)
BUN SERPL-MCNC: 14 MG/DL (ref 6–23)
BUN SERPL-MCNC: 14 MG/DL (ref 6–23)
CALCIUM SERPL-MCNC: 9.3 MG/DL (ref 8.6–10.3)
CALCIUM SERPL-MCNC: 9.3 MG/DL (ref 8.6–10.3)
CHLORIDE SERPL-SCNC: 105 MMOL/L (ref 98–107)
CHLORIDE SERPL-SCNC: 105 MMOL/L (ref 98–107)
CO2 SERPL-SCNC: 26 MMOL/L (ref 21–32)
CO2 SERPL-SCNC: 26 MMOL/L (ref 21–32)
CREAT SERPL-MCNC: 0.66 MG/DL (ref 0.5–1.05)
CREAT SERPL-MCNC: 0.66 MG/DL (ref 0.5–1.05)
EGFRCR SERPLBLD CKD-EPI 2021: >90 ML/MIN/1.73M*2
EGFRCR SERPLBLD CKD-EPI 2021: >90 ML/MIN/1.73M*2
ERYTHROCYTE [DISTWIDTH] IN BLOOD BY AUTOMATED COUNT: 16.1 % (ref 11.5–14.5)
GLUCOSE SERPL-MCNC: 121 MG/DL (ref 74–99)
GLUCOSE SERPL-MCNC: 121 MG/DL (ref 74–99)
HCT VFR BLD AUTO: 25.7 % (ref 36–46)
HGB BLD-MCNC: 8.5 G/DL (ref 12–16)
MCH RBC QN AUTO: 28.2 PG (ref 26–34)
MCHC RBC AUTO-ENTMCNC: 33.1 G/DL (ref 32–36)
MCV RBC AUTO: 85 FL (ref 80–100)
NRBC BLD-RTO: 0 /100 WBCS (ref 0–0)
PLATELET # BLD AUTO: 453 X10*3/UL (ref 150–450)
POTASSIUM SERPL-SCNC: 4.3 MMOL/L (ref 3.5–5.3)
POTASSIUM SERPL-SCNC: 4.3 MMOL/L (ref 3.5–5.3)
PROT SERPL-MCNC: 5.5 G/DL (ref 6.4–8.2)
RBC # BLD AUTO: 3.01 X10*6/UL (ref 4–5.2)
SODIUM SERPL-SCNC: 139 MMOL/L (ref 136–145)
SODIUM SERPL-SCNC: 139 MMOL/L (ref 136–145)
WBC # BLD AUTO: 7 X10*3/UL (ref 4.4–11.3)

## 2024-07-16 PROCEDURE — 80048 BASIC METABOLIC PNL TOTAL CA: CPT

## 2024-07-16 PROCEDURE — 80053 COMPREHEN METABOLIC PANEL: CPT

## 2024-07-16 PROCEDURE — 85027 COMPLETE CBC AUTOMATED: CPT

## 2024-07-18 NOTE — OP NOTE
OPERATIVE REPORT     Date: 2024                        OR Location: [facility] OR     Name: Rita Rubi, : 1955, Age: 69 y.o., MRN: [id number], Sex: female     Diagnosis  Pre-op Diagnosis      * Coronary artery disease involving native coronary artery of native heart, unspecified whether angina present [I25.10]  Principal Problem:    Coronary artery disease involving native coronary artery of native heart  Active Problems:    Coronary artery disease involving native coronary artery of native heart with unstable angina pectoris (Multi)    Post-op Diagnosis     * Coronary artery disease involving native coronary artery of native heart, unspecified whether angina present [I25.10]  Principal Problem:    Coronary artery disease involving native coronary artery of native heart  Active Problems:    Coronary artery disease involving native coronary artery of native heart with unstable angina pectoris (Multi)         Procedures  Coronary Artery Bypass Graft x2 ; LIMA --> LAD, EVH RA --> RAMUS --> CX  27560 - NE CABG W/ARTERIAL GRAFT THREE ARTERIAL GRAFTS     Median Sternotomy  EVH Right Radial Artery  EVH Right Saphenous Vein  CABG x 3 (LIMA to LAD, Radial To Ramus, sequenced to Circumflex)   Posterior Pericardial Window  Surgeons   Franci Kee MD        Resident/Fellow/Other Assistant:  Surgeons and Role:  * No surgeons found with a matching role *     Procedure Summary  Anesthesia: General               ASA: III  Anesthesia Staff:   Anesthesiologist: Willem Schwarz MD  C-AA: MARY Duarte; MARY Drake  Perfusionist: Mesfin Youngblood     Estimated Blood Loss: 250mL     Specimen: No specimens collected      Staff:   Circulator: Mona Borrego RN  Relief Circulator: Dorys Morton RN  Relief Scrub: Samson Wang RN  Scrub Person: Iza Deleon RN           Findings: LIMA to LAD 26 ml/min, PI 3.6  Radial To Ramus, sequenced to Circ 26 ml/min,PI 1.6     Indications: Rita Rubi is an 69 y.o.  female with hx of stroke and coronary artery disease with plan for CABG  Cardio Pulmonary Bypass Time: 108 min  Cross-clamp Time: 77 min     The risks benefits and alternatives were discussed with the patient and include but are not limited to, bleeding, infection, injury to other structures, need for re-operation, arrhythmia, respiratory failure, prolonged intubation, stroke, and death.  These were discussed with the patient in detail, all questions were answered and informed consent was obtained.       Procedure Details: The patient was taken to the operating room by anesthesia, placed supine on the operating table, intubated and placed under general anesthesia.  A central line and chanel were placed. FELICIANO was performed which confirmed the preoperative findings of EF of 55% without significant valvular disease.  The patient was prepped and draped in the usual sterile fashion.  A time out was performed. The sternotomy was made in the usual fashion.  The left internal mammary artery was taken down in a skeletonized fashion. The radial graft was harvested endoscopically at the same time. Heparin was administered and the LIMA was divided distally.  It was noted to have excellent pulsatile flow.  Papaverine was applied and the LIMA was wrapped in a papaverine soaked gauze.  The chest retractor was placed, and the pericardium was opened. The aorta and right atrium were cannulated.  Antegrade and retrograde cannulas were placed.  The mammmary and vein were prepared.  The patient was placed on bypass.  Targets were inspected and noted to be of good quality.  The cross clamp was applied, the heart was arrested with antegrade cardioplegia followed by retrograde cardioplegia. Cardioplegia was administered at 15 minute intervals throughout the case.   The heart was lifted and a posterior pericardectomy was performed. We began with the distal circumflex which was opened and noted to be of good caliber.  The radial was  anastomosed with a running 7-0 prolene suture. Cardioplegia was given down the radial and in a retrograde fashion.  We then turend our attention to the lateral wall.  The ramus was identified and exposed.  It was opened and noted to be of good caliber.  The radial was then sequenced in a side to side fashion with a  running 7-0 prolene suture. Cardioplegia was given down the radial and in a retrograde fashion.   We then turned our attention to the LAD, this was opened and the lima was anastomosed using a running 7-0 prolene suture.  The radial was brought up to the aorta and was going to be a bit tight so the decision was made to take a small piece of vein and extend the graft.  This was done with a spatulated end to end anastomosis.  The vein was then taken off the aorta with a running 6-0 prolene suture off the ascending aorta.  The heart was de-aired, the cross clamp was removed.  The heart started beating immediately.   Atrial and ventricular wires were placed.  The patient was weaned easily from bypass. All grafts were measured with the flow probe and noted to have excellent flow. Protamine was administered.  All cannulas were removed and sites were oversewn with 4-0 prolene.  Hemostasis was secured.  Chest tubes were placed.  The sternum was closed with wires.  The subcutaneous tissue was closed in layers with running vicryl sutures.  The skin was closed with running vicryl suture and covered with dermabond.  The patient tolerated the procedure well.  All instrument, needle and sponge counts were correct at the end of the case.  The patient was left intubated and transferred to the ICU in stable but critical condition.                 Franci Kee MD  Cardiac Surgery  07/18/24  8:26 AM

## 2024-07-18 NOTE — OP NOTE
OPERATIVE REPORT     Date: 2024  OR Location: [facility] OR    Name: Rita Rubi, : 1955, Age: 69 y.o., MRN: [id number], Sex: female    Diagnosis  Pre-op Diagnosis      * Coronary artery disease involving native coronary artery of native heart, unspecified whether angina present [I25.10]  Principal Problem:    Coronary artery disease involving native coronary artery of native heart  Active Problems:    Coronary artery disease involving native coronary artery of native heart with unstable angina pectoris (Multi)   Post-op Diagnosis     * Coronary artery disease involving native coronary artery of native heart, unspecified whether angina present [I25.10]  Principal Problem:    Coronary artery disease involving native coronary artery of native heart  Active Problems:    Coronary artery disease involving native coronary artery of native heart with unstable angina pectoris (Multi)       Procedures  Coronary Artery Bypass Graft x2 ; LIMA --> LAD, EVH RA --> RAMUS --> CX  34180 - ID CABG W/ARTERIAL GRAFT THREE ARTERIAL GRAFTS    Median Sternotomy  EVH Right Radial Artery  EVH Right Saphenous Vein  CABG x 3 (LIMA to LAD, Radial To Ramus, sequenced to Circumflex)   Posterior Pericardial Window  Surgeons   Franci Kee MD      Resident/Fellow/Other Assistant:  Surgeons and Role:  * No surgeons found with a matching role *    Procedure Summary  Anesthesia: General  ASA: III  Anesthesia Staff:   Anesthesiologist: Willem Schwarz MD  C-AA: MARY Duarte; MARY Drake  Perfusionist: Mesfin Youngblood    Estimated Blood Loss: 250mL    Specimen: No specimens collected     Staff:   Circulator: Mona Borrego RN  Relief Circulator: Dorys Morton RN  Relief Scrub: Samson Wang RN  Scrub Person: Iza Deleon RN        Findings: LIMA to LAD 26 ml/min, PI 3.6  Radial To Ramus, sequenced to Circ 26 ml/min,PI 1.6    Indications: Rita Rubi is an 69 y.o. female with hx of stroke and coronary artery disease  with plan for CABG  Cardio Pulmonary Bypass Time: 108 min  Cross-clamp Time: 77 min    The risks benefits and alternatives were discussed with the patient and include but are not limited to, bleeding, infection, injury to other structures, need for re-operation, arrhythmia, respiratory failure, prolonged intubation, stroke, and death.  These were discussed with the patient in detail, all questions were answered and informed consent was obtained.      Procedure Details: The patient was taken to the operating room by anesthesia, placed supine on the operating table, intubated and placed under general anesthesia.  A central line and chanel were placed. FELICIANO was performed which confirmed the preoperative findings of EF of 55% without significant valvular disease.  The patient was prepped and draped in the usual sterile fashion.  A time out was performed. The sternotomy was made in the usual fashion.  The left internal mammary artery was taken down in a skeletonized fashion. The radial graft was harvested endoscopically at the same time. Heparin was administered and the LIMA was divided distally.  It was noted to have excellent pulsatile flow.  Papaverine was applied and the LIMA was wrapped in a papaverine soaked gauze.  The chest retractor was placed, and the pericardium was opened. The aorta and right atrium were cannulated.  Antegrade and retrograde cannulas were placed.  The mammmary and vein were prepared.  The patient was placed on bypass.  Targets were inspected and noted to be of good quality.  The cross clamp was applied, the heart was arrested with antegrade cardioplegia followed by retrograde cardioplegia. Cardioplegia was administered at 15 minute intervals throughout the case.   The heart was lifted and a posterior pericardectomy was performed. We began with the distal circumflex which was opened and noted to be of good caliber.  The radial was anastomosed with a running 7-0 prolene suture. Cardioplegia was  given down the radial and in a retrograde fashion.  We then turend our attention to the lateral wall.  The ramus was identified and exposed.  It was opened and noted to be of good caliber.  The radial was then sequenced in a side to side fashion with a  running 7-0 prolene suture. Cardioplegia was given down the radial and in a retrograde fashion.   We then turned our attention to the LAD, this was opened and the lima was anastomosed using a running 7-0 prolene suture.  The radial was brought up to the aorta and was going to be a bit tight so the decision was made to take a small piece of vein and extend the graft.  This was done with a spatulated end to end anastomosis.  The vein was then taken off the aorta with a running 6-0 prolene suture off the ascending aorta.  The heart was de-aired, the cross clamp was removed.  The heart started beating immediately.   Atrial and ventricular wires were placed.  The patient was weaned easily from bypass. All grafts were measured with the flow probe and noted to have excellent flow. Protamine was administered.  All cannulas were removed and sites were oversewn with 4-0 prolene.  Hemostasis was secured.  Chest tubes were placed.  The sternum was closed with wires.  The subcutaneous tissue was closed in layers with running vicryl sutures.  The skin was closed with running vicryl suture and covered with dermabond.  The patient tolerated the procedure well.  All instrument, needle and sponge counts were correct at the end of the case.  The patient was left intubated and transferred to the ICU in stable but critical condition.            Franci Kee MD  Cardiac Surgery  07/18/24  8:26 AM

## 2024-07-23 ENCOUNTER — LAB REQUISITION (OUTPATIENT)
Dept: LAB | Facility: HOSPITAL | Age: 69
End: 2024-07-23
Payer: COMMERCIAL

## 2024-07-23 DIAGNOSIS — Z51.89 ENCOUNTER FOR OTHER SPECIFIED AFTERCARE: ICD-10-CM

## 2024-07-23 LAB
ANION GAP SERPL CALC-SCNC: 11 MMOL/L (ref 10–20)
BUN SERPL-MCNC: 9 MG/DL (ref 6–23)
CALCIUM SERPL-MCNC: 9.3 MG/DL (ref 8.6–10.3)
CHLORIDE SERPL-SCNC: 106 MMOL/L (ref 98–107)
CO2 SERPL-SCNC: 26 MMOL/L (ref 21–32)
CREAT SERPL-MCNC: 0.62 MG/DL (ref 0.5–1.05)
EGFRCR SERPLBLD CKD-EPI 2021: >90 ML/MIN/1.73M*2
ERYTHROCYTE [DISTWIDTH] IN BLOOD BY AUTOMATED COUNT: 16.3 % (ref 11.5–14.5)
GLUCOSE SERPL-MCNC: 93 MG/DL (ref 74–99)
HCT VFR BLD AUTO: 28.1 % (ref 36–46)
HGB BLD-MCNC: 9.1 G/DL (ref 12–16)
MCH RBC QN AUTO: 27.6 PG (ref 26–34)
MCHC RBC AUTO-ENTMCNC: 32.4 G/DL (ref 32–36)
MCV RBC AUTO: 85 FL (ref 80–100)
NRBC BLD-RTO: 0 /100 WBCS (ref 0–0)
PLATELET # BLD AUTO: 331 X10*3/UL (ref 150–450)
POTASSIUM SERPL-SCNC: 4.2 MMOL/L (ref 3.5–5.3)
RBC # BLD AUTO: 3.3 X10*6/UL (ref 4–5.2)
SODIUM SERPL-SCNC: 139 MMOL/L (ref 136–145)
WBC # BLD AUTO: 6 X10*3/UL (ref 4.4–11.3)

## 2024-07-23 PROCEDURE — 80048 BASIC METABOLIC PNL TOTAL CA: CPT

## 2024-07-23 PROCEDURE — 85027 COMPLETE CBC AUTOMATED: CPT

## 2024-07-25 ENCOUNTER — TELEPHONE (OUTPATIENT)
Dept: PRIMARY CARE | Facility: CLINIC | Age: 69
End: 2024-07-25

## 2024-07-25 ENCOUNTER — APPOINTMENT (OUTPATIENT)
Dept: PRIMARY CARE | Facility: CLINIC | Age: 69
End: 2024-07-25

## 2024-07-25 VITALS — SYSTOLIC BLOOD PRESSURE: 110 MMHG | DIASTOLIC BLOOD PRESSURE: 73 MMHG | TEMPERATURE: 98 F | HEART RATE: 84 BPM

## 2024-07-25 DIAGNOSIS — Z09 HOSPITAL DISCHARGE FOLLOW-UP: Primary | ICD-10-CM

## 2024-07-25 PROCEDURE — 3078F DIAST BP <80 MM HG: CPT | Performed by: FAMILY MEDICINE

## 2024-07-25 PROCEDURE — 3044F HG A1C LEVEL LT 7.0%: CPT | Performed by: FAMILY MEDICINE

## 2024-07-25 PROCEDURE — 1111F DSCHRG MED/CURRENT MED MERGE: CPT | Performed by: FAMILY MEDICINE

## 2024-07-25 PROCEDURE — 4010F ACE/ARB THERAPY RXD/TAKEN: CPT | Performed by: FAMILY MEDICINE

## 2024-07-25 PROCEDURE — 1036F TOBACCO NON-USER: CPT | Performed by: FAMILY MEDICINE

## 2024-07-25 PROCEDURE — 1159F MED LIST DOCD IN RCRD: CPT | Performed by: FAMILY MEDICINE

## 2024-07-25 PROCEDURE — 3061F NEG MICROALBUMINURIA REV: CPT | Performed by: FAMILY MEDICINE

## 2024-07-25 PROCEDURE — 3048F LDL-C <100 MG/DL: CPT | Performed by: FAMILY MEDICINE

## 2024-07-25 PROCEDURE — 99496 TRANSJ CARE MGMT HIGH F2F 7D: CPT | Performed by: FAMILY MEDICINE

## 2024-07-25 PROCEDURE — 3074F SYST BP LT 130 MM HG: CPT | Performed by: FAMILY MEDICINE

## 2024-07-25 NOTE — PROGRESS NOTES
Subjective   Patient ID: Rita Rubi is a 69 y.o. female who presents for Follow-up.  HPI    The patient is a 70 yo AA female with a history of adhesive capsulitis, allergic rhinitis,  bariatric surgery, CAD s/p MVD with in-stent restenosis s/p CABGx2, PPW , LVEF 65%, HTN, HLD, TYLER, GERD, CVA (chronic Rt MCA infarct) with residual Lt sided deficit on DAPT, NIDDM II, Anemia.  The patient is here for a post hospitalization follow-up visit from 7/2/2024- 7/10/2024.    HOSPITAL COURSE  Work up for chest pain revealed Multivessel disease including in stent re-stenosis of the circumflex and proximal LAD.  She was admitted to ICU s/p Coronary Artery Bypass Graft x2 ; LIMA --> LAD, EVH RA --> RAMUS --> CX -Posterior Pericardial Window with Dr Kee on 7/2     Medications stopped.  -Plavix  -Metoprolol  -NTG SL     Patient was discharged to a subacute rehab and was released from it on 7/24/2024.  She is still concerned about the residual left hemiparesis from her CVA from 2023.  She is schedule to have home PT stating next week.     A review of system was completed.  All systems were reviewed and were normal, except for the ones that are listed in the HPI.    Objective   Physical Exam  Constitutional:       Appearance: Normal appearance.   HENT:      Head: Normocephalic and atraumatic.      Right Ear: Tympanic membrane, ear canal and external ear normal.      Left Ear: Tympanic membrane, ear canal and external ear normal.      Nose: Nose normal.      Mouth/Throat:      Mouth: Mucous membranes are moist.      Pharynx: Oropharynx is clear.   Eyes:      Extraocular Movements: Extraocular movements intact.      Conjunctiva/sclera: Conjunctivae normal.      Pupils: Pupils are equal, round, and reactive to light.   Cardiovascular:      Rate and Rhythm: Normal rate and regular rhythm.      Pulses: Normal pulses.   Pulmonary:      Effort: Pulmonary effort is normal.      Breath sounds: Normal breath sounds.   Abdominal:      General:  Abdomen is flat. Bowel sounds are normal.      Palpations: Abdomen is soft.   Musculoskeletal:         General: Normal range of motion.      Cervical back: Normal range of motion and neck supple.   Skin:     General: Skin is warm.   Neurological:      General: No focal deficit present.      Mental Status: She is alert and oriented to person, place, and time. Mental status is at baseline.   Psychiatric:         Mood and Affect: Mood normal.         Behavior: Behavior normal.         Thought Content: Thought content normal.         Judgment: Judgment normal.         Assessment/Plan   Problem List Items Addressed This Visit       Hospital discharge follow-up - Primary            Patient to return to office in 4 weeks for reassessment.

## 2024-08-22 DIAGNOSIS — M25.50 ARTHRALGIA, UNSPECIFIED JOINT: Primary | ICD-10-CM

## 2024-08-26 RX ORDER — MULTIVITAMIN WITH FOLIC ACID 400 MCG
1 TABLET ORAL DAILY
Qty: 30 TABLET | OUTPATIENT
Start: 2024-08-26

## 2024-08-26 RX ORDER — DICLOFENAC SODIUM 10 MG/G
GEL TOPICAL
Qty: 100 G | OUTPATIENT
Start: 2024-08-26

## 2024-08-27 DIAGNOSIS — I25.10 CORONARY ARTERY DISEASE INVOLVING NATIVE CORONARY ARTERY OF NATIVE HEART, UNSPECIFIED WHETHER ANGINA PRESENT: ICD-10-CM

## 2024-08-27 RX ORDER — DICLOFENAC SODIUM 10 MG/G
4 GEL TOPICAL 4 TIMES DAILY
Qty: 100 G | Refills: 1 | Status: SHIPPED | OUTPATIENT
Start: 2024-08-27

## 2024-08-27 RX ORDER — MV,CALCIUM,MIN/IRON/FOLIC/VITK 9-200-40
1 TABLET,CHEWABLE ORAL DAILY
Qty: 90 TABLET | Refills: 3 | Status: SHIPPED | OUTPATIENT
Start: 2024-08-27

## 2024-08-28 ENCOUNTER — OFFICE VISIT (OUTPATIENT)
Dept: CARDIAC SURGERY | Facility: HOSPITAL | Age: 69
End: 2024-08-28
Payer: MEDICARE

## 2024-08-28 ENCOUNTER — HOSPITAL ENCOUNTER (OUTPATIENT)
Dept: RADIOLOGY | Facility: HOSPITAL | Age: 69
Discharge: HOME | End: 2024-08-28
Payer: MEDICARE

## 2024-08-28 VITALS
HEART RATE: 92 BPM | SYSTOLIC BLOOD PRESSURE: 125 MMHG | DIASTOLIC BLOOD PRESSURE: 80 MMHG | BODY MASS INDEX: 30.92 KG/M2 | OXYGEN SATURATION: 97 % | HEIGHT: 67 IN | WEIGHT: 197 LBS

## 2024-08-28 DIAGNOSIS — Z95.1 S/P CABG X 3: ICD-10-CM

## 2024-08-28 DIAGNOSIS — I25.10 CORONARY ARTERY DISEASE INVOLVING NATIVE CORONARY ARTERY OF NATIVE HEART, UNSPECIFIED WHETHER ANGINA PRESENT: ICD-10-CM

## 2024-08-28 PROCEDURE — 1126F AMNT PAIN NOTED NONE PRSNT: CPT | Performed by: STUDENT IN AN ORGANIZED HEALTH CARE EDUCATION/TRAINING PROGRAM

## 2024-08-28 PROCEDURE — 71046 X-RAY EXAM CHEST 2 VIEWS: CPT | Performed by: RADIOLOGY

## 2024-08-28 PROCEDURE — 3079F DIAST BP 80-89 MM HG: CPT | Performed by: STUDENT IN AN ORGANIZED HEALTH CARE EDUCATION/TRAINING PROGRAM

## 2024-08-28 PROCEDURE — 3008F BODY MASS INDEX DOCD: CPT | Performed by: STUDENT IN AN ORGANIZED HEALTH CARE EDUCATION/TRAINING PROGRAM

## 2024-08-28 PROCEDURE — 3061F NEG MICROALBUMINURIA REV: CPT | Performed by: STUDENT IN AN ORGANIZED HEALTH CARE EDUCATION/TRAINING PROGRAM

## 2024-08-28 PROCEDURE — 71046 X-RAY EXAM CHEST 2 VIEWS: CPT

## 2024-08-28 PROCEDURE — 3074F SYST BP LT 130 MM HG: CPT | Performed by: STUDENT IN AN ORGANIZED HEALTH CARE EDUCATION/TRAINING PROGRAM

## 2024-08-28 PROCEDURE — 1036F TOBACCO NON-USER: CPT | Performed by: STUDENT IN AN ORGANIZED HEALTH CARE EDUCATION/TRAINING PROGRAM

## 2024-08-28 PROCEDURE — 1159F MED LIST DOCD IN RCRD: CPT | Performed by: STUDENT IN AN ORGANIZED HEALTH CARE EDUCATION/TRAINING PROGRAM

## 2024-08-28 PROCEDURE — 99211 OFF/OP EST MAY X REQ PHY/QHP: CPT | Performed by: STUDENT IN AN ORGANIZED HEALTH CARE EDUCATION/TRAINING PROGRAM

## 2024-08-28 PROCEDURE — 3048F LDL-C <100 MG/DL: CPT | Performed by: STUDENT IN AN ORGANIZED HEALTH CARE EDUCATION/TRAINING PROGRAM

## 2024-08-28 PROCEDURE — 4010F ACE/ARB THERAPY RXD/TAKEN: CPT | Performed by: STUDENT IN AN ORGANIZED HEALTH CARE EDUCATION/TRAINING PROGRAM

## 2024-08-28 PROCEDURE — 3044F HG A1C LEVEL LT 7.0%: CPT | Performed by: STUDENT IN AN ORGANIZED HEALTH CARE EDUCATION/TRAINING PROGRAM

## 2024-08-28 ASSESSMENT — PAIN SCALES - GENERAL: PAINLEVEL: 0-NO PAIN

## 2024-08-28 NOTE — PROGRESS NOTES
Chief Complaint      HPI:   Ms. Rita Rubi is a 69 y.o. female, who presents for post-operative evaluation.   She is now s/p CABG, and is recovering nicely.  She has resumed normal activities and her appetite is returned to normal.  She has no chest pain, no shortness of breath and denies palpitations, dizziness, or syncope.     Past Medical History:   Diagnosis Date    Allergic rhinitis     Anemia     Anticoagulated     Plavix    Arthritis     left knee    Coronary artery disease involving native coronary artery of native heart, unspecified whether angina present     GERD (gastroesophageal reflux disease)     under control    Hyperlipidemia     Hypertension     Ischemic stroke (Multi) 09/2023    Cerebral infarction due to thrombosis of right middle cerebral artery    TYLER (obstructive sleep apnea)     does not use CPAP (stopped after her bariatric surgery)    Stuttering     has become worse since her stroke    Type 2 diabetes mellitus (Multi)     1/26/2024 A1C 5.7%       Past Surgical History:   Procedure Laterality Date    BREAST BIOPSY Left     benign    CARDIAC CATHETERIZATION N/A 05/01/2024    Procedure: Left Heart Cath, No LV;  Surgeon: Armond Leyva MD;  Location: [address] Cardiac Cath Lab;  Service: Cardiovascular;  Laterality: N/A;    CATARACT EXTRACTION Left 11/02/2016    CATARACT EXTRACTION Right 12/23/2013    CORONARY ANGIOPLASTY WITH STENT PLACEMENT  03/29/2023    FASCIOTOMY Left 06/01/2011    ENDOSCOPIC FASCIOTOMY PLANTAR    GASTRIC RESTRICTION SURGERY      MR HEAD ANGIO WO IV CONTRAST  09/10/2023    MR HEAD ANGIO WO IV CONTRAST 9/10/2023 [facility] MRI    MR NECK ANGIO WO IV CONTRAST  09/10/2023    MR NECK ANGIO WO IV CONTRAST 9/10/2023 [facility]    TOTAL ABDOMINAL HYSTERECTOMY         Family History   Problem Relation Name Age of Onset    Heart attack Mother          CABG in mother in her 40s and sudden death at age 50    Esophageal cancer Father      Epilepsy Sister         Social History      Socioeconomic History    Marital status: Single     Spouse name: Not on file    Number of children: Not on file    Years of education: Not on file    Highest education level: Not on file   Occupational History    Not on file   Tobacco Use    Smoking status: Never     Passive exposure: Never    Smokeless tobacco: Never   Vaping Use    Vaping status: Never Used   Substance and Sexual Activity    Alcohol use: Not Currently    Drug use: Never    Sexual activity: Defer   Other Topics Concern    Not on file   Social History Narrative    Not on file     Social Determinants of Health     Financial Resource Strain: Low Risk  (7/3/2024)    Overall Financial Resource Strain (CARDIA)     Difficulty of Paying Living Expenses: Not hard at all   Food Insecurity: Not on file   Transportation Needs: No Transportation Needs (7/3/2024)    PRAPARE - Transportation     Lack of Transportation (Medical): No     Lack of Transportation (Non-Medical): No   Physical Activity: Not on file   Stress: Not on file   Social Connections: Not on file   Intimate Partner Violence: Not on file   Housing Stability: Low Risk  (7/3/2024)    Housing Stability Vital Sign     Unable to Pay for Housing in the Last Year: No     Number of Places Lived in the Last Year: 1     Unstable Housing in the Last Year: No       Allergies   Allergen Reactions    House Dust Runny nose    Pollen Extracts Hives and Itching       Outpatient Encounter Medications as of 8/28/2024   Medication Sig Dispense Refill    alpha tocopherol (Vitamin E) 268 mg (400 unit) capsule Take 1 capsule (400 Units) by mouth once daily.      amLODIPine (Norvasc) 5 mg tablet TAKE 1 TABLET BY MOUTH EVERY DAY 30 tablet 1    aspirin 81 mg EC tablet Take 1 tablet (81 mg) by mouth once daily. 90 tablet 1    atorvastatin (Lipitor) 80 mg tablet Take 1 tablet (80 mg) by mouth once daily at bedtime. 90 tablet 1    cyanocobalamin (Vitamin B-12) 250 mcg tablet Take 1 tablet (250 mcg) by mouth once daily.       diclofenac sodium (Voltaren) 1 % gel Apply 4.5 inches (4 g) topically 4 times a day. 100 g 1    empagliflozin (Jardiance) 10 mg TAKE 1 TABLET BY MOUTH EVERY DAY 30 tablet 1    enalapril (Vasotec) 20 mg tablet TAKE 1 TABLET BY MOUTH TWICE A DAY 60 tablet 1    fluticasone (Flonase) 50 mcg/actuation nasal spray 1-2 sprays QD (Patient taking differently: Administer 1 spray into each nostril if needed for allergies or rhinitis. 1-2 sprays QD) 16 g 5    metFORMIN (Glucophage) 500 mg tablet TAKE 1 TABLET BY MOUTH TWICE A DAY WITH MEALS 60 tablet 1    mv-calcium-min-iron fm-FA-vitK (Optisource) 9 mg iron-200 mcg-40 mcg tablet,chewable Chew 1 tablet once daily. 90 tablet 3    ONETOUCH ULTRASOFT LANCETS MISC Use as directed      docusate sodium (Colace) 100 mg capsule TAKE 1 CAPSULE (100 MG) BY MOUTH TWICE A DAY AS NEEDED FOR CONSTIPATION 60 capsule 3    furosemide (Lasix) 20 mg tablet Take 1 tablet (20 mg) by mouth once daily for 7 days. 7 tablet 0    isosorbide mononitrate ER (Imdur) 30 mg 24 hr tablet Take 1 tablet (30 mg) by mouth once daily for 20 doses. Do not crush or chew. 20 tablet 0    metoprolol tartrate (Lopressor) 50 mg tablet Take 1 tablet by mouth 2 times a day. 60 tablet 0    [DISCONTINUED] aspirin 81 mg EC tablet Take 1 tablet (81 mg) by mouth once daily. 90 tablet 1    [DISCONTINUED] mv,calcium,min/iron/folic/vitK (OPTISOURCE ORAL) Take 1 tablet by mouth once daily.       No facility-administered encounter medications on file as of 8/28/2024.       Physical Exam   General: no acute distress  Cardiovascular: Regular rate and rhythm  Respiratory: symmetrical chest rise and fall, no increased work of breathing  Wound: incisions well healed  Extremities: no edema       Results: CXR clear without infiltrate or effusion     Assessment and Plan:    Ms. Rita Rubi is a 69 y.o. female, who is recovering well after surgery.      Referral in place for cardiac rehab  Ok to drive, ok to return to normal activities    Continue to follow up with cardiology   Does not need to return to clinic, but was instructed to call if any issues arise       Franci Kee MD  08/28/24  3:02 PM

## 2024-09-03 ENCOUNTER — APPOINTMENT (OUTPATIENT)
Dept: PRIMARY CARE | Facility: CLINIC | Age: 69
End: 2024-09-03
Payer: COMMERCIAL

## 2024-09-05 ENCOUNTER — APPOINTMENT (OUTPATIENT)
Dept: CARDIAC REHAB | Facility: CLINIC | Age: 69
End: 2024-09-05

## 2024-09-06 ENCOUNTER — OFFICE VISIT (OUTPATIENT)
Dept: PRIMARY CARE | Facility: HOSPITAL | Age: 69
End: 2024-09-06

## 2024-09-06 VITALS
OXYGEN SATURATION: 99 % | TEMPERATURE: 96.3 F | HEART RATE: 81 BPM | HEIGHT: 67 IN | SYSTOLIC BLOOD PRESSURE: 131 MMHG | DIASTOLIC BLOOD PRESSURE: 74 MMHG | WEIGHT: 194.7 LBS | BODY MASS INDEX: 30.56 KG/M2

## 2024-09-06 DIAGNOSIS — E66.9 CLASS 1 OBESITY: Primary | ICD-10-CM

## 2024-09-06 PROCEDURE — 3044F HG A1C LEVEL LT 7.0%: CPT | Performed by: FAMILY MEDICINE

## 2024-09-06 PROCEDURE — 3008F BODY MASS INDEX DOCD: CPT | Performed by: FAMILY MEDICINE

## 2024-09-06 PROCEDURE — 1126F AMNT PAIN NOTED NONE PRSNT: CPT | Performed by: FAMILY MEDICINE

## 2024-09-06 PROCEDURE — 4010F ACE/ARB THERAPY RXD/TAKEN: CPT | Performed by: FAMILY MEDICINE

## 2024-09-06 PROCEDURE — 3061F NEG MICROALBUMINURIA REV: CPT | Performed by: FAMILY MEDICINE

## 2024-09-06 PROCEDURE — 3078F DIAST BP <80 MM HG: CPT | Performed by: FAMILY MEDICINE

## 2024-09-06 PROCEDURE — 99213 OFFICE O/P EST LOW 20 MIN: CPT | Performed by: FAMILY MEDICINE

## 2024-09-06 PROCEDURE — 3075F SYST BP GE 130 - 139MM HG: CPT | Performed by: FAMILY MEDICINE

## 2024-09-06 PROCEDURE — 3048F LDL-C <100 MG/DL: CPT | Performed by: FAMILY MEDICINE

## 2024-09-06 PROCEDURE — 1036F TOBACCO NON-USER: CPT | Performed by: FAMILY MEDICINE

## 2024-09-06 PROCEDURE — 1159F MED LIST DOCD IN RCRD: CPT | Performed by: FAMILY MEDICINE

## 2024-09-06 ASSESSMENT — ENCOUNTER SYMPTOMS
OCCASIONAL FEELINGS OF UNSTEADINESS: 0
DEPRESSION: 0
LOSS OF SENSATION IN FEET: 0
CONSTITUTIONAL NEGATIVE: 1

## 2024-09-06 ASSESSMENT — PAIN SCALES - GENERAL: PAINLEVEL: 0-NO PAIN

## 2024-09-06 NOTE — PROGRESS NOTES
"Subjective   Patient ID: Rita Rubi is a 69 y.o. female who presents for Follow-up.    She is status post CABG and is recovering well. She was recently discharged from cardiac rehab. She is still interested in weight loss. Her weight today, 194lbs, is among the lowest weights she has achieved over the past 3 years. She is still following our three nutrition goals, and plans to return to regular hiking in the near future. She is very pleased with the results.          Review of Systems   Constitutional: Negative.        Objective   /74 (BP Location: Right arm, Patient Position: Sitting, BP Cuff Size: Adult)   Pulse 81   Temp 35.7 °C (96.3 °F) (Temporal)   Ht 1.702 m (5' 7\")   Wt 88.3 kg (194 lb 11.2 oz)   LMP  (LMP Unknown)   SpO2 99%   BMI 30.49 kg/m²     Physical Exam  Constitutional:       Appearance: Normal appearance. She is obese.     Weight of 194lbs is down from 204lbs in August 2023.    Assessment/Plan :  Despite all she's been through, Rita is still committed to weight loss and continues to do well. Reenforced all four goals. F/U 4 months, at which point, I'm hopeful her weight will be in the range of 185-190lbs.         "

## 2024-09-09 ENCOUNTER — TELEPHONE (OUTPATIENT)
Dept: PRIMARY CARE | Facility: CLINIC | Age: 69
End: 2024-09-09
Payer: COMMERCIAL

## 2024-09-10 ENCOUNTER — TELEPHONE (OUTPATIENT)
Dept: PRIMARY CARE | Facility: CLINIC | Age: 69
End: 2024-09-10
Payer: COMMERCIAL

## 2024-09-12 NOTE — PROGRESS NOTES
Chief Complaint:      History Of Present Illness:    Rita Rubi is a 69 y.o. female presenting for coronary artery disease follow up    Rita Rubi has ASCVD risk factors of hypertension, dyslipidemia, DM II on OHA and family history of premature CAD ( CABG in mother in her 40s and sudden death at age 50). Additionally she has a BMI of 33 after bariatric surgery procedure.     She underwent CABG July 2024. This was uncomplicated. She is back at home. She walks regularly. She feels well. Occassional dyspnea and fatigue when brisk. No angina or undue dyspnea. She is working on going to cardiac rehab.          TESTING  -Lipid panel Aug 2023- LDL 63, HDl 46,  No dyslipidemia  -TTE Oct 2023- LVEF 65%, No RWMA, impaired relaxation, mild AR, no other valve abnormalities. No interatrial shunt.  -Brain MRI Sept 2023â€“ punctate diffusion abnormality in the right corona radiata. No intracranial hemorrhage. No significant vascular abnormalities  - CTA HEAD Sept 2023 - No occlusive extra-cranial or intracranial vascular disease  - Hgb A1C 6.8  - Holter Oct 2023 - No atrial arrhythmias     PAST MEDICAL HISTORY  CAD - 80% mid LCx stenosis, 90% distal RCA stenosis, 50% proximal LAD stenosis. The LAD ( RFR 0.92, FFR 0.90) therefore PCI of mid Left circumflex and distal RCA were performed 03/27/2023 via right radial access. Progression of LAD disease and instent restenosis of Lcx stent treated with CABG  CABG x 3 ( LIMA to LAD, Sequential right radial to ramus intermedius and left circumflex by Dr Franci Kee 7/2/2024  2. CVA - Sept 9 2023 on DAPT and atorvastatin Confirmed on Brain MRI at right corona radiata. She has left hemiparesis and speech impediment ( stuttering) is more pronounced.  3. Obesity  4. DM II on OHA  5. Dyslipidemia  6. Bariatric surgery        Last Recorded Vitals:  Vitals:    09/13/24 1535   BP: 131/79   BP Location: Left arm   Patient Position: Sitting   BP Cuff Size: Large adult   Pulse: 85   SpO2: 98%  "  Weight: 88.7 kg (195 lb 9.6 oz)   Height: 1.676 m (5' 6\")       Past Medical History:  She has a past medical history of Allergic rhinitis, Anemia, Anticoagulated, Arthritis, Coronary artery disease involving native coronary artery of native heart, unspecified whether angina present, GERD (gastroesophageal reflux disease), Hyperlipidemia, Hypertension, Ischemic stroke (Multi) (09/2023), TYLER (obstructive sleep apnea), Stuttering, and Type 2 diabetes mellitus (Multi).    Past Surgical History:  She has a past surgical history that includes Total abdominal hysterectomy; Cataract extraction (Left, 11/02/2016); Cataract extraction (Right, 12/23/2013); Gastric restriction surgery; MR angio head wo IV contrast (09/10/2023); MR angio neck wo IV contrast (09/10/2023); Breast biopsy (Left); Cardiac catheterization (N/A, 05/01/2024); Fasciotomy (Left, 06/01/2011); and Coronary angioplasty with stent (03/29/2023).      Social History:  She reports that she has never smoked. She has never been exposed to tobacco smoke. She has never used smokeless tobacco. She reports that she does not currently use alcohol. She reports that she does not use drugs.    Family History:  Family History   Problem Relation Name Age of Onset    Heart attack Mother          CABG in mother in her 40s and sudden death at age 50    Esophageal cancer Father      Epilepsy Sister          Allergies:  House dust and Pollen extracts    Outpatient Medications:  Current Outpatient Medications   Medication Instructions    alpha tocopherol (VITAMIN E) 400 Units, oral, Daily    amLODIPine (NORVASC) 5 mg, oral, Daily    aspirin 81 mg, oral, Daily    atorvastatin (LIPITOR) 80 mg, oral, Nightly    cyanocobalamin (VITAMIN B-12) 250 mcg, oral, Daily    diclofenac sodium (VOLTAREN) 4 g, Topical, 4 times daily    enalapril (VASOTEC) 20 mg, oral, 2 times daily    fluticasone (Flonase) 50 mcg/actuation nasal spray 1-2 sprays QD    furosemide (LASIX) 20 mg, oral, Daily    " isosorbide mononitrate ER (IMDUR) 30 mg, oral, Daily, Do not crush or chew.    Jardiance 10 mg, oral, Daily    metFORMIN (GLUCOPHAGE) 500 mg, oral, 2 times daily (morning and late afternoon)    metoprolol succinate XL (TOPROL-XL) 25 mg, oral, Daily    metoprolol tartrate (LOPRESSOR) 50 mg, oral, 2 times daily    mv-calcium-min-iron fm-FA-vitK (Optisource) 9 mg iron-200 mcg-40 mcg tablet,chewable 1 tablet, oral, Daily    ONETOUCH ULTRASOFT LANCETS MISC miscellaneous, Use as directed       Physical Exam:  Physical Exam  Constitutional:       Appearance: Normal appearance.   HENT:      Head: Normocephalic and atraumatic.   Musculoskeletal:      Comments: Healed right radial and right knee and ankle conduit access sites   Neurological:      Mental Status: She is alert.           Last Labs:  CBC -  Lab Results   Component Value Date    WBC 6.0 07/23/2024    HGB 9.1 (L) 07/23/2024    HCT 28.1 (L) 07/23/2024    MCV 85 07/23/2024     07/23/2024       CMP -  Lab Results   Component Value Date    CALCIUM 9.3 07/23/2024    PHOS 2.7 07/10/2024    PROT 5.5 (L) 07/16/2024    ALBUMIN 3.2 (L) 07/16/2024    AST 28 07/16/2024    ALT 40 07/16/2024    ALKPHOS 52 07/16/2024    BILITOT 0.9 07/16/2024       LIPID PANEL -   Lab Results   Component Value Date    CHOL 150 01/29/2024    TRIG 53 01/29/2024    HDL 59.8 01/29/2024    CHHDL 2.5 01/29/2024    LDLF 63 08/11/2023    VLDL 11 01/29/2024    NHDL 90 01/29/2024       RENAL FUNCTION PANEL -   Lab Results   Component Value Date    GLUCOSE 93 07/23/2024     07/23/2024    K 4.2 07/23/2024     07/23/2024    CO2 26 07/23/2024    ANIONGAP 11 07/23/2024    BUN 9 07/23/2024    CREATININE 0.62 07/23/2024    GFRMALE CANCELED 09/15/2023    CALCIUM 9.3 07/23/2024    PHOS 2.7 07/10/2024    ALBUMIN 3.2 (L) 07/16/2024        Lab Results   Component Value Date    HGBA1C 6.1 (H) 06/24/2024       ASSESSMENT AND PLAN  CAD s/p CABG x 3  Hypertension  DM II on OHA  Dyslipidemia  History of  CVA      Ms Rubi is doing well post CABG. She has mild exertional dyspnea. She will be worked up with a transthoracic echocardiogram  She is appropriately on high intensity statin.  Lipid panel check to ensure at goal.  Follow up in 3 month with results.      Armond Leyva MD

## 2024-09-13 ENCOUNTER — OFFICE VISIT (OUTPATIENT)
Dept: CARDIOLOGY | Facility: HOSPITAL | Age: 69
End: 2024-09-13

## 2024-09-13 VITALS
BODY MASS INDEX: 31.43 KG/M2 | HEART RATE: 85 BPM | SYSTOLIC BLOOD PRESSURE: 131 MMHG | WEIGHT: 195.6 LBS | HEIGHT: 66 IN | DIASTOLIC BLOOD PRESSURE: 79 MMHG | OXYGEN SATURATION: 98 %

## 2024-09-13 DIAGNOSIS — Z95.1 S/P CABG X 3: Primary | ICD-10-CM

## 2024-09-13 DIAGNOSIS — Z86.73 S/P STROKE DUE TO CEREBROVASCULAR DISEASE: ICD-10-CM

## 2024-09-13 PROCEDURE — 1036F TOBACCO NON-USER: CPT | Performed by: INTERNAL MEDICINE

## 2024-09-13 PROCEDURE — 4010F ACE/ARB THERAPY RXD/TAKEN: CPT | Performed by: INTERNAL MEDICINE

## 2024-09-13 PROCEDURE — 3008F BODY MASS INDEX DOCD: CPT | Performed by: INTERNAL MEDICINE

## 2024-09-13 PROCEDURE — 3048F LDL-C <100 MG/DL: CPT | Performed by: INTERNAL MEDICINE

## 2024-09-13 PROCEDURE — 3075F SYST BP GE 130 - 139MM HG: CPT | Performed by: INTERNAL MEDICINE

## 2024-09-13 PROCEDURE — 1126F AMNT PAIN NOTED NONE PRSNT: CPT | Performed by: INTERNAL MEDICINE

## 2024-09-13 PROCEDURE — 99213 OFFICE O/P EST LOW 20 MIN: CPT | Performed by: INTERNAL MEDICINE

## 2024-09-13 PROCEDURE — 1159F MED LIST DOCD IN RCRD: CPT | Performed by: INTERNAL MEDICINE

## 2024-09-13 PROCEDURE — 3078F DIAST BP <80 MM HG: CPT | Performed by: INTERNAL MEDICINE

## 2024-09-13 PROCEDURE — 3044F HG A1C LEVEL LT 7.0%: CPT | Performed by: INTERNAL MEDICINE

## 2024-09-13 PROCEDURE — 3061F NEG MICROALBUMINURIA REV: CPT | Performed by: INTERNAL MEDICINE

## 2024-09-13 RX ORDER — METOPROLOL SUCCINATE 25 MG/1
25 TABLET, EXTENDED RELEASE ORAL DAILY
COMMUNITY
Start: 2024-07-22

## 2024-09-13 ASSESSMENT — PATIENT HEALTH QUESTIONNAIRE - PHQ9
2. FEELING DOWN, DEPRESSED OR HOPELESS: NOT AT ALL
1. LITTLE INTEREST OR PLEASURE IN DOING THINGS: NOT AT ALL
SUM OF ALL RESPONSES TO PHQ9 QUESTIONS 1 AND 2: 0

## 2024-09-13 ASSESSMENT — PAIN SCALES - GENERAL: PAINLEVEL: 0-NO PAIN

## 2024-09-13 ASSESSMENT — ENCOUNTER SYMPTOMS
DEPRESSION: 0
OCCASIONAL FEELINGS OF UNSTEADINESS: 0
LOSS OF SENSATION IN FEET: 0

## 2024-09-13 NOTE — PROGRESS NOTES
[facility] Questionnaire      1  Heart failure affects different people in different ways. Some feel shortness of breath while others feel fatigue. Please indicate how much you are limited by heart failure (shortness of breath or fatigue) in your ability to do the following activities over the past 2 weeks. PRE PROCEDURE    A.) Showering/bathing  5. Not at All  B.) Walking 1 block on level ground 5. Not at All  C.) Hurrying or Jogging   3. Moderately    2.  Over the past 2 weeks, how many times did you have swelling in your feet, ankles or legs when you woke up in the morning? 3. 1-2 times a week    3.  Over the past 2 weeks, on average, how many times has fatigue limited your ability to do what you wanted? 6. Less than once a week    4.  Over the past 2 weeks, on average, how many times has shortness of breath limited your ability to do what you wanted? 7. Never    5.  Over the past 2 weeks, on average, how many times have you been forced to sleep sitting up in a chair or with at least 3 pillows to prop you up because of shortness of breath? Never    6. Over the past 2 weeks, how much has your heart failure limited your enjoyment of life? It has not limited my enjoyment of life    7. If you had to spend the rest of your life with your heart failure the way it is right now, how would you feel about this? 4. Mostly satisfied    8. How much does your heart failure affect your lifestyle? Please indicate how your heart failure may have limited yourparticipation in the following activities over the past 2 weeks    A.)  Hobbies, recreational activities  5. Did not limit at all    B.) Working or doing household chores  4. Slightly limited    C.) Visiting family or friends out of your home  5. Did not limit at all    5 Meter Walk 17.51 seconds

## 2024-10-07 ENCOUNTER — HOSPITAL ENCOUNTER (OUTPATIENT)
Dept: CARDIOLOGY | Facility: HOSPITAL | Age: 69
Discharge: HOME | End: 2024-10-07
Payer: MEDICARE

## 2024-10-07 DIAGNOSIS — I25.119 ATHEROSCLEROTIC HEART DISEASE OF NATIVE CORONARY ARTERY WITH UNSPECIFIED ANGINA PECTORIS: ICD-10-CM

## 2024-10-07 DIAGNOSIS — Z95.1 S/P CABG X 3: ICD-10-CM

## 2024-10-07 DIAGNOSIS — Z86.73 S/P STROKE DUE TO CEREBROVASCULAR DISEASE: ICD-10-CM

## 2024-10-07 PROCEDURE — 93306 TTE W/DOPPLER COMPLETE: CPT

## 2024-10-07 PROCEDURE — 93306 TTE W/DOPPLER COMPLETE: CPT | Performed by: INTERNAL MEDICINE

## 2024-10-07 PROCEDURE — 2500000004 HC RX 250 GENERAL PHARMACY W/ HCPCS (ALT 636 FOR OP/ED): Performed by: INTERNAL MEDICINE

## 2024-10-08 LAB
AORTIC VALVE MEAN GRADIENT: 6 MMHG
AORTIC VALVE PEAK VELOCITY: 1.64 M/S
AV PEAK GRADIENT: 10.8 MMHG
AVA (PEAK VEL): 1.66 CM2
AVA (VTI): 1.68 CM2
EJECTION FRACTION APICAL 4 CHAMBER: 54.5
EJECTION FRACTION: 61 %
LEFT ATRIUM VOLUME AREA LENGTH INDEX BSA: 36.5 ML/M2
LEFT VENTRICLE INTERNAL DIMENSION DIASTOLE: 4.08 CM (ref 3.5–6)
LEFT VENTRICULAR OUTFLOW TRACT DIAMETER: 1.9 CM
MITRAL VALVE E/A RATIO: 0.83
RIGHT VENTRICLE FREE WALL PEAK S': 7.08 CM/S
RIGHT VENTRICLE PEAK SYSTOLIC PRESSURE: 22.7 MMHG
TRICUSPID ANNULAR PLANE SYSTOLIC EXCURSION: 1.5 CM

## 2024-10-09 ENCOUNTER — TELEPHONE (OUTPATIENT)
Dept: CARDIOLOGY | Facility: HOSPITAL | Age: 69
End: 2024-10-09
Payer: COMMERCIAL

## 2024-10-09 NOTE — TELEPHONE ENCOUNTER
Transthgoracic echocardiogram results discussed with the patient.  All normal LV function after surgical revascularization., no significant valve abnormalities

## 2024-10-25 ENCOUNTER — APPOINTMENT (OUTPATIENT)
Dept: PRIMARY CARE | Facility: CLINIC | Age: 69
End: 2024-10-25

## 2024-10-25 VITALS
SYSTOLIC BLOOD PRESSURE: 140 MMHG | BODY MASS INDEX: 31.15 KG/M2 | DIASTOLIC BLOOD PRESSURE: 79 MMHG | TEMPERATURE: 98.2 F | WEIGHT: 193 LBS | HEART RATE: 80 BPM

## 2024-10-25 DIAGNOSIS — Z95.1 S/P CABG X 3: ICD-10-CM

## 2024-10-25 DIAGNOSIS — I25.110 CORONARY ARTERY DISEASE INVOLVING NATIVE CORONARY ARTERY OF NATIVE HEART WITH UNSTABLE ANGINA PECTORIS: ICD-10-CM

## 2024-10-25 PROCEDURE — 3077F SYST BP >= 140 MM HG: CPT | Performed by: FAMILY MEDICINE

## 2024-10-25 PROCEDURE — 3048F LDL-C <100 MG/DL: CPT | Performed by: FAMILY MEDICINE

## 2024-10-25 PROCEDURE — 3078F DIAST BP <80 MM HG: CPT | Performed by: FAMILY MEDICINE

## 2024-10-25 PROCEDURE — 3044F HG A1C LEVEL LT 7.0%: CPT | Performed by: FAMILY MEDICINE

## 2024-10-25 PROCEDURE — 1125F AMNT PAIN NOTED PAIN PRSNT: CPT | Performed by: FAMILY MEDICINE

## 2024-10-25 PROCEDURE — 99024 POSTOP FOLLOW-UP VISIT: CPT | Performed by: FAMILY MEDICINE

## 2024-10-25 PROCEDURE — 1159F MED LIST DOCD IN RCRD: CPT | Performed by: FAMILY MEDICINE

## 2024-10-25 PROCEDURE — 4010F ACE/ARB THERAPY RXD/TAKEN: CPT | Performed by: FAMILY MEDICINE

## 2024-10-25 PROCEDURE — 3061F NEG MICROALBUMINURIA REV: CPT | Performed by: FAMILY MEDICINE

## 2024-10-25 RX ORDER — METOPROLOL SUCCINATE 25 MG/1
25 TABLET, EXTENDED RELEASE ORAL DAILY
Qty: 90 TABLET | Refills: 1 | Status: SHIPPED | OUTPATIENT
Start: 2024-10-25

## 2024-10-25 ASSESSMENT — PAIN SCALES - GENERAL: PAINLEVEL_OUTOF10: 6

## 2024-12-02 DIAGNOSIS — E11.9 DIABETES MELLITUS TYPE 2 IN NONOBESE (MULTI): ICD-10-CM

## 2024-12-02 DIAGNOSIS — I25.10 ATHEROSCLEROTIC CARDIOVASCULAR DISEASE: ICD-10-CM

## 2025-03-10 ENCOUNTER — APPOINTMENT (OUTPATIENT)
Dept: PRIMARY CARE | Facility: CLINIC | Age: 70
End: 2025-03-10
Payer: MEDICARE

## 2025-03-25 ENCOUNTER — TELEPHONE (OUTPATIENT)
Dept: PRIMARY CARE | Facility: CLINIC | Age: 70
End: 2025-03-25
Payer: MEDICARE

## 2025-03-31 DIAGNOSIS — E78.5 HYPERLIPIDEMIA, UNSPECIFIED HYPERLIPIDEMIA TYPE: ICD-10-CM

## 2025-04-06 RX ORDER — ATORVASTATIN CALCIUM 80 MG/1
80 TABLET, FILM COATED ORAL DAILY
Qty: 30 TABLET | Refills: 0 | Status: SHIPPED | OUTPATIENT
Start: 2025-04-06

## 2025-04-07 DIAGNOSIS — I10 PRIMARY HYPERTENSION: ICD-10-CM

## 2025-04-08 RX ORDER — AMLODIPINE BESYLATE 5 MG/1
5 TABLET ORAL DAILY
Qty: 30 TABLET | Refills: 0 | Status: SHIPPED | OUTPATIENT
Start: 2025-04-08 | End: 2025-04-10 | Stop reason: SDUPTHER

## 2025-04-10 ENCOUNTER — APPOINTMENT (OUTPATIENT)
Dept: PRIMARY CARE | Facility: CLINIC | Age: 70
End: 2025-04-10
Payer: MEDICARE

## 2025-04-10 VITALS
DIASTOLIC BLOOD PRESSURE: 76 MMHG | SYSTOLIC BLOOD PRESSURE: 163 MMHG | TEMPERATURE: 98 F | BODY MASS INDEX: 30.83 KG/M2 | HEART RATE: 71 BPM | WEIGHT: 191 LBS

## 2025-04-10 DIAGNOSIS — Z78.0 ASYMPTOMATIC MENOPAUSE: ICD-10-CM

## 2025-04-10 DIAGNOSIS — Z00.00 MEDICARE ANNUAL WELLNESS VISIT, SUBSEQUENT: Primary | ICD-10-CM

## 2025-04-10 DIAGNOSIS — I10 BENIGN ESSENTIAL HYPERTENSION: ICD-10-CM

## 2025-04-10 DIAGNOSIS — Z13.820 OSTEOPOROSIS SCREENING: ICD-10-CM

## 2025-04-10 DIAGNOSIS — Z95.1 S/P CABG X 3: ICD-10-CM

## 2025-04-10 DIAGNOSIS — Z12.31 VISIT FOR SCREENING MAMMOGRAM: ICD-10-CM

## 2025-04-10 DIAGNOSIS — Z23 IMMUNIZATION DUE: ICD-10-CM

## 2025-04-10 DIAGNOSIS — Z12.11 SCREEN FOR COLON CANCER: ICD-10-CM

## 2025-04-10 DIAGNOSIS — E78.5 HYPERLIPIDEMIA, UNSPECIFIED HYPERLIPIDEMIA TYPE: ICD-10-CM

## 2025-04-10 DIAGNOSIS — E11.9 DIABETES MELLITUS TYPE 2 IN NONOBESE (MULTI): ICD-10-CM

## 2025-04-10 DIAGNOSIS — I25.10 ATHEROSCLEROTIC CARDIOVASCULAR DISEASE: ICD-10-CM

## 2025-04-10 DIAGNOSIS — I10 PRIMARY HYPERTENSION: ICD-10-CM

## 2025-04-10 DIAGNOSIS — I25.110 CORONARY ARTERY DISEASE INVOLVING NATIVE CORONARY ARTERY OF NATIVE HEART WITH UNSTABLE ANGINA PECTORIS: ICD-10-CM

## 2025-04-10 DIAGNOSIS — Z13.220 SCREENING CHOLESTEROL LEVEL: ICD-10-CM

## 2025-04-10 DIAGNOSIS — Z12.11 COLON CANCER SCREENING: ICD-10-CM

## 2025-04-10 PROCEDURE — 99214 OFFICE O/P EST MOD 30 MIN: CPT | Performed by: FAMILY MEDICINE

## 2025-04-10 PROCEDURE — G0009 ADMIN PNEUMOCOCCAL VACCINE: HCPCS | Performed by: FAMILY MEDICINE

## 2025-04-10 PROCEDURE — 4010F ACE/ARB THERAPY RXD/TAKEN: CPT | Performed by: FAMILY MEDICINE

## 2025-04-10 PROCEDURE — 3077F SYST BP >= 140 MM HG: CPT | Performed by: FAMILY MEDICINE

## 2025-04-10 PROCEDURE — G0439 PPPS, SUBSEQ VISIT: HCPCS | Performed by: FAMILY MEDICINE

## 2025-04-10 PROCEDURE — 1170F FXNL STATUS ASSESSED: CPT | Performed by: FAMILY MEDICINE

## 2025-04-10 PROCEDURE — 1159F MED LIST DOCD IN RCRD: CPT | Performed by: FAMILY MEDICINE

## 2025-04-10 PROCEDURE — 3078F DIAST BP <80 MM HG: CPT | Performed by: FAMILY MEDICINE

## 2025-04-10 PROCEDURE — 90677 PCV20 VACCINE IM: CPT | Performed by: FAMILY MEDICINE

## 2025-04-10 RX ORDER — FLUTICASONE PROPIONATE 50 MCG
SPRAY, SUSPENSION (ML) NASAL
Qty: 16 G | Refills: 5 | Status: SHIPPED | OUTPATIENT
Start: 2025-04-10

## 2025-04-10 RX ORDER — AMLODIPINE BESYLATE 5 MG/1
5 TABLET ORAL DAILY
Qty: 30 TABLET | Refills: 0 | Status: SHIPPED | OUTPATIENT
Start: 2025-04-10

## 2025-04-10 RX ORDER — ATORVASTATIN CALCIUM 80 MG/1
80 TABLET, FILM COATED ORAL DAILY
Qty: 30 TABLET | Refills: 0 | Status: SHIPPED | OUTPATIENT
Start: 2025-04-10

## 2025-04-10 RX ORDER — ENALAPRIL MALEATE 20 MG/1
20 TABLET ORAL 2 TIMES DAILY
Qty: 60 TABLET | Refills: 1 | Status: SHIPPED | OUTPATIENT
Start: 2025-04-10

## 2025-04-10 RX ORDER — METFORMIN HYDROCHLORIDE 500 MG/1
500 TABLET ORAL
Qty: 60 TABLET | Refills: 1 | Status: SHIPPED | OUTPATIENT
Start: 2025-04-10

## 2025-04-10 RX ORDER — METOPROLOL SUCCINATE 25 MG/1
25 TABLET, EXTENDED RELEASE ORAL DAILY
Qty: 90 TABLET | Refills: 1 | Status: SHIPPED | OUTPATIENT
Start: 2025-04-10

## 2025-04-10 ASSESSMENT — PATIENT HEALTH QUESTIONNAIRE - PHQ9
1. LITTLE INTEREST OR PLEASURE IN DOING THINGS: NOT AT ALL
SUM OF ALL RESPONSES TO PHQ9 QUESTIONS 1 AND 2: 0
2. FEELING DOWN, DEPRESSED OR HOPELESS: NOT AT ALL

## 2025-04-10 ASSESSMENT — ENCOUNTER SYMPTOMS
DEPRESSION: 0
OCCASIONAL FEELINGS OF UNSTEADINESS: 0
LOSS OF SENSATION IN FEET: 0

## 2025-04-10 ASSESSMENT — ACTIVITIES OF DAILY LIVING (ADL)
MANAGING_FINANCES: INDEPENDENT
DOING_HOUSEWORK: INDEPENDENT
GROCERY_SHOPPING: INDEPENDENT
DRESSING: INDEPENDENT
BATHING: INDEPENDENT
TAKING_MEDICATION: INDEPENDENT

## 2025-04-10 NOTE — ASSESSMENT & PLAN NOTE
-Continue BB, statin, Jardiance.  -follow-up with cardiologist as scheduled.   -DM II controlled.  LDL: 80 (1/29/2024).

## 2025-04-10 NOTE — PROGRESS NOTES
Subjective   Patient ID: Rita Rubi is a 70 y.o. female who presents for Medicare Annual Wellness Visit Initial.  HPI  The patient is a 71 yo AA female with a history of adhesive capsulitis, allergic rhinitis,  bariatric surgery, CAD s/p MVD with in-stent restenosis s/p CABGx2, PPW , LVEF 65%, HTN, HLD, TYLER, GERD, CVA (chronic Rt MCA infarct) with residual Lt sided deficit on DAPT, NIDDM II, Anemia.  The patient is here for:     1-MWV.  -mammogram 12/6/22- 2/01/2024- repeat right mammogram/ US 2/8/24 and 5/08/2024.   -colonoscopy done several years ago- FIT 2022- COLOGUARD ordered.   -pap smear- no hx of abnormal pap smear and HYSTERECTOMY.   -dexa scan 10/28/2019- 4/18/2025: wnl.  -continue vit D.  -blood test due.  Ordered.   -Immunization: Prevnar 20 given today.      A review of system was completed.  All systems were reviewed and were normal, except for the ones that are listed in the HPI.    Objective   Physical Exam  Constitutional:       Appearance: Normal appearance.   HENT:      Head: Normocephalic and atraumatic.      Right Ear: Tympanic membrane, ear canal and external ear normal.      Left Ear: Tympanic membrane, ear canal and external ear normal.      Nose: Nose normal.      Mouth/Throat:      Mouth: Mucous membranes are moist.      Pharynx: Oropharynx is clear.   Eyes:      Extraocular Movements: Extraocular movements intact.      Conjunctiva/sclera: Conjunctivae normal.      Pupils: Pupils are equal, round, and reactive to light.   Cardiovascular:      Rate and Rhythm: Normal rate and regular rhythm.      Pulses: Normal pulses.   Pulmonary:      Effort: Pulmonary effort is normal.      Breath sounds: Normal breath sounds.   Abdominal:      General: Abdomen is flat. Bowel sounds are normal.      Palpations: Abdomen is soft.   Musculoskeletal:         General: Normal range of motion.      Cervical back: Normal range of motion and neck supple.   Skin:     General: Skin is warm.   Neurological:       General: No focal deficit present.      Mental Status: She is alert and oriented to person, place, and time. Mental status is at baseline.   Psychiatric:         Mood and Affect: Mood normal.         Behavior: Behavior normal.         Thought Content: Thought content normal.         Judgment: Judgment normal.     Assessment/Plan   Problem List Items Addressed This Visit       Primary hypertension    Relevant Medications    amLODIPine (Norvasc) 5 mg tablet    Diabetes mellitus type 2 in nonobese (Multi)    Relevant Medications    metFORMIN (Glucophage) 500 mg tablet    empagliflozin (Jardiance) 10 mg tablet    Other Relevant Orders    Hemoglobin A1c    Albumin-Creatinine Ratio, Urine Random    CBC    Comprehensive Metabolic Panel    TSH with reflex to Free T4 if abnormal    LDL cholesterol, direct    Hyperlipidemia     -Continue Atorvastatin 80 mg every day.          Relevant Medications    enalapril (Vasotec) 20 mg tablet    atorvastatin (Lipitor) 80 mg tablet    fluticasone (Flonase) 50 mcg/actuation nasal spray    Atherosclerotic cardiovascular disease    Relevant Medications    empagliflozin (Jardiance) 10 mg tablet    Benign essential hypertension     -Usually controlled at home.  -Amlodipine, Enalapril, Metoprolol refilled today.          Asymptomatic menopause    Colon cancer screening    Screening cholesterol level    Coronary artery disease involving native coronary artery of native heart with unstable angina pectoris     -Continue BB, statin, Jardiance.  -follow-up with cardiologist as scheduled.   -DM II controlled.  LDL: 80 (1/29/2024).         Relevant Medications    metoprolol succinate XL (Toprol-XL) 25 mg 24 hr tablet    amLODIPine (Norvasc) 5 mg tablet    Screen for colon cancer    Relevant Orders    Colonoscopy Screening; Average Risk Patient    S/P CABG x 3    Relevant Medications    metoprolol succinate XL (Toprol-XL) 25 mg 24 hr tablet    Other Relevant Orders    CBC    Comprehensive Metabolic  Panel    TSH with reflex to Free T4 if abnormal    Medicare annual wellness visit, subsequent - Primary     -mammogram 12/6/22- 2/01/2024- repeat right mammogram/ US 2/8/24 and 5/08/2024.   -colonoscopy done several years ago- FIT 2022- COLONOSCOPY ordered.   -pap smear- no hx of abnormal pap smear and HYSTERECTOMY.   -dexa scan 10/28/2019- 4/18/2025: wnl.  -continue vit D.  -blood test due.  Ordered.   -Immunization: Prevnar 20 given today.          Osteoporosis screening    Visit for screening mammogram    Immunization due    Patient to return to office in 6 months.

## 2025-04-10 NOTE — ASSESSMENT & PLAN NOTE
-mammogram 12/6/22- 2/01/2024- repeat right mammogram/ US 2/8/24 and 5/08/2024.   -colonoscopy done several years ago- FIT 2022- COLONOSCOPY ordered.   -pap smear- no hx of abnormal pap smear and HYSTERECTOMY.   -dexa scan 10/28/2019- 4/18/2025: wnl.  -continue vit D.  -blood test due.  Ordered.   -Immunization: Prevnar 20 given today.

## 2025-04-11 LAB
ALBUMIN SERPL-MCNC: 4.5 G/DL (ref 3.6–5.1)
ALP SERPL-CCNC: 45 U/L (ref 37–153)
ALT SERPL-CCNC: 12 U/L (ref 6–29)
ANION GAP SERPL CALCULATED.4IONS-SCNC: 11 MMOL/L (CALC) (ref 7–17)
AST SERPL-CCNC: 22 U/L (ref 10–35)
BILIRUB SERPL-MCNC: 0.7 MG/DL (ref 0.2–1.2)
BUN SERPL-MCNC: 13 MG/DL (ref 7–25)
CALCIUM SERPL-MCNC: 9.9 MG/DL (ref 8.6–10.4)
CHLORIDE SERPL-SCNC: 109 MMOL/L (ref 98–110)
CO2 SERPL-SCNC: 21 MMOL/L (ref 20–32)
CREAT SERPL-MCNC: 0.73 MG/DL (ref 0.6–1)
EGFRCR SERPLBLD CKD-EPI 2021: 87 ML/MIN/1.73M2
ERYTHROCYTE [DISTWIDTH] IN BLOOD BY AUTOMATED COUNT: 14.6 % (ref 11–15)
EST. AVERAGE GLUCOSE BLD GHB EST-MCNC: 117 MG/DL
EST. AVERAGE GLUCOSE BLD GHB EST-SCNC: 6.5 MMOL/L
GLUCOSE SERPL-MCNC: 96 MG/DL (ref 65–99)
HBA1C MFR BLD: 5.7 % OF TOTAL HGB
HCT VFR BLD AUTO: 32.5 % (ref 35–45)
HGB BLD-MCNC: 11.4 G/DL (ref 11.7–15.5)
LDLC SERPL DIRECT ASSAY-MCNC: 69 MG/DL
MCH RBC QN AUTO: 29.7 PG (ref 27–33)
MCHC RBC AUTO-ENTMCNC: 35.1 G/DL (ref 32–36)
MCV RBC AUTO: 84.6 FL (ref 80–100)
PLATELET # BLD AUTO: 192 THOUSAND/UL (ref 140–400)
PMV BLD REES-ECKER: 10.9 FL (ref 7.5–12.5)
POTASSIUM SERPL-SCNC: 4.3 MMOL/L (ref 3.5–5.3)
PROT SERPL-MCNC: 7.2 G/DL (ref 6.1–8.1)
RBC # BLD AUTO: 3.84 MILLION/UL (ref 3.8–5.1)
SODIUM SERPL-SCNC: 141 MMOL/L (ref 135–146)
TSH SERPL-ACNC: 1.65 MIU/L (ref 0.4–4.5)
WBC # BLD AUTO: 5.7 THOUSAND/UL (ref 3.8–10.8)

## 2025-04-28 DIAGNOSIS — Z12.11 COLON CANCER SCREENING: ICD-10-CM

## 2025-04-28 RX ORDER — POLYETHYLENE GLYCOL 3350, SODIUM SULFATE ANHYDROUS, SODIUM BICARBONATE, SODIUM CHLORIDE, POTASSIUM CHLORIDE 236; 22.74; 6.74; 5.86; 2.97 G/4L; G/4L; G/4L; G/4L; G/4L
POWDER, FOR SOLUTION ORAL
Qty: 4000 ML | Refills: 0 | Status: SHIPPED | OUTPATIENT
Start: 2025-04-28

## 2025-05-08 ENCOUNTER — TELEPHONE (OUTPATIENT)
Dept: PRIMARY CARE | Facility: CLINIC | Age: 70
End: 2025-05-08

## 2025-05-08 ENCOUNTER — TELEMEDICINE (OUTPATIENT)
Dept: PRIMARY CARE | Facility: CLINIC | Age: 70
End: 2025-05-08
Payer: MEDICARE

## 2025-05-08 DIAGNOSIS — R73.09 ELEVATED HEMOGLOBIN A1C: ICD-10-CM

## 2025-05-08 DIAGNOSIS — Z95.1 S/P CABG X 3: Primary | ICD-10-CM

## 2025-05-08 PROCEDURE — G2211 COMPLEX E/M VISIT ADD ON: HCPCS | Performed by: FAMILY MEDICINE

## 2025-05-08 PROCEDURE — 4010F ACE/ARB THERAPY RXD/TAKEN: CPT | Performed by: FAMILY MEDICINE

## 2025-05-08 PROCEDURE — 99213 OFFICE O/P EST LOW 20 MIN: CPT | Performed by: FAMILY MEDICINE

## 2025-05-08 NOTE — ASSESSMENT & PLAN NOTE
-The patient was recommended to follow-up with her cardiologist for further management and a referral to cardiac rehab.

## 2025-05-08 NOTE — PROGRESS NOTES
Subjective   Patient ID: Rita Rubi is a 70 y.o. female who presents for leg pain.  HPI  The patient consented to have the video virtual call today.  She was in her house in [place].  Since  The patient is a 69 yo AA female with a history of adhesive capsulitis, allergic rhinitis,  bariatric surgery, CAD s/p MVD with in-stent restenosis s/p CABGx2, PPW , LVEF 65%, HTN, HLD, TYLER, GERD, CVA (chronic Rt MCA infarct) with residual Lt sided deficit on DAPT, NIDDM II, Anemia.  The patient is here for:     1- Referral to cardiac rehabilitation.  She states that she never completed her cardiac rehabilitation after her heart surgery done last year.  2.  Elevated A1c at 5.7, in the prediabetic range.    A review of system was completed.  All systems were reviewed and were normal, except for the ones that are listed in the HPI.    Objective   Physical Exam    Assessment/Plan   Problem List Items Addressed This Visit       S/P CABG x 3 - Primary    -The patient was recommended to follow-up with her cardiologist for further management and a referral to cardiac rehab.         Elevated hemoglobin A1c    -Diet and lifestyle modification discussed.  Low carbohydrate/low sugar diet recommended.         Patient to return to office as scheduled.

## 2025-05-29 DIAGNOSIS — E78.5 HYPERLIPIDEMIA, UNSPECIFIED HYPERLIPIDEMIA TYPE: ICD-10-CM

## 2025-05-29 DIAGNOSIS — I10 PRIMARY HYPERTENSION: ICD-10-CM

## 2025-05-31 RX ORDER — ENALAPRIL MALEATE 20 MG/1
20 TABLET ORAL 2 TIMES DAILY
Qty: 180 TABLET | Refills: 1 | Status: SHIPPED | OUTPATIENT
Start: 2025-05-31

## 2025-05-31 RX ORDER — AMLODIPINE BESYLATE 5 MG/1
5 TABLET ORAL DAILY
Qty: 90 TABLET | Refills: 1 | Status: SHIPPED | OUTPATIENT
Start: 2025-05-31

## 2025-06-03 ENCOUNTER — APPOINTMENT (OUTPATIENT)
Dept: GASTROENTEROLOGY | Facility: EXTERNAL LOCATION | Age: 70
End: 2025-06-03
Payer: MEDICARE

## 2025-06-03 DIAGNOSIS — Z12.11 SCREEN FOR COLON CANCER: Primary | ICD-10-CM

## 2025-06-03 DIAGNOSIS — Z12.11 SCREEN FOR COLON CANCER: ICD-10-CM

## 2025-06-03 PROCEDURE — 4010F ACE/ARB THERAPY RXD/TAKEN: CPT | Performed by: INTERNAL MEDICINE

## 2025-06-03 PROCEDURE — G0121 COLON CA SCRN NOT HI RSK IND: HCPCS | Performed by: INTERNAL MEDICINE

## 2025-06-16 DIAGNOSIS — E11.9 DIABETES MELLITUS TYPE 2 IN NONOBESE (MULTI): ICD-10-CM

## 2025-06-21 RX ORDER — METFORMIN HYDROCHLORIDE 500 MG/1
500 TABLET ORAL
Qty: 180 TABLET | Refills: 1 | Status: SHIPPED | OUTPATIENT
Start: 2025-06-21

## 2025-06-23 DIAGNOSIS — E78.5 HYPERLIPIDEMIA, UNSPECIFIED HYPERLIPIDEMIA TYPE: Primary | ICD-10-CM

## 2025-06-24 RX ORDER — ATORVASTATIN CALCIUM 80 MG/1
80 TABLET, FILM COATED ORAL DAILY
Qty: 30 TABLET | Refills: 0 | Status: SHIPPED | OUTPATIENT
Start: 2025-06-24

## 2025-07-14 ENCOUNTER — TELEPHONE (OUTPATIENT)
Dept: PRIMARY CARE | Facility: CLINIC | Age: 70
End: 2025-07-14
Payer: MEDICARE

## 2025-07-26 DIAGNOSIS — E78.5 HYPERLIPIDEMIA, UNSPECIFIED HYPERLIPIDEMIA TYPE: ICD-10-CM

## 2025-07-29 RX ORDER — ATORVASTATIN CALCIUM 80 MG/1
80 TABLET, FILM COATED ORAL DAILY
Qty: 30 TABLET | Refills: 0 | Status: SHIPPED | OUTPATIENT
Start: 2025-07-29

## 2025-10-10 ENCOUNTER — APPOINTMENT (OUTPATIENT)
Dept: PRIMARY CARE | Facility: CLINIC | Age: 70
End: 2025-10-10
Payer: MEDICARE

## (undated) DEVICE — COVER, MAYO STAND, W/PAD, 23 IN, DISPOSABLE, PLASTIC, LF, STERILE

## (undated) DEVICE — SUTURE, NUROLON, 0, 18 IN, CT1, DETACHABLE, MULTIPACK, BLACK

## (undated) DEVICE — OXYGENATOR FX 25, W/HR, ARTERIAL FILTER

## (undated) DEVICE — RETRACTOR, SUTURE, HOLDING, INSERT, OCTOBASE, DISPOSABLE

## (undated) DEVICE — GUIDEWIRE, INQWIRE, 3MM J, .035 X 210CM, FIXED

## (undated) DEVICE — PERFUSION SERVICES

## (undated) DEVICE — CANNULA, ARTERIOTOMY, BULB TIP, 2 MM X 3.2 CM

## (undated) DEVICE — CANNULA, AORTIC, ROOT, STANDARD, FLANGE, RADIOPAQUE TIP, W/FLOW-GUARD, 9 FR X 14 CM

## (undated) DEVICE — CANNULA, VESSEL, FREE FLOW, BLUNT TIP, 3 MM X 5 CM

## (undated) DEVICE — SUTURE, PROLENE, 6-0, 30 IN, RB-2, BLUE

## (undated) DEVICE — SYRINGE, 30 CC, LUER LOCK

## (undated) DEVICE — DEVICE, CLOSURE, PERCLOSE, PROSTYLE

## (undated) DEVICE — MICROCOAGULATION TEST, ACT+ TEST CUVETTE

## (undated) DEVICE — CLIP, LIGATING, W/ADHESIVE, WIDE SLOT, SMALL, TITANIUM

## (undated) DEVICE — SPONGE, HEMOSTATIC, GELATIN, SURGIFOAM, 8 X 12.5 CM X 10 MM

## (undated) DEVICE — DRAPE, FLUID WARMER

## (undated) DEVICE — CLIP, SPRING, BULLDOG, FOGARTY, SOFT JAW, 6 MM, LF

## (undated) DEVICE — CAUTERY, PENCIL, PUSH BUTTON, SMOKE EVAC, 70MM

## (undated) DEVICE — SUTURE, PROLENE, 4-0, 36 IN, RB1, DA, BLUE

## (undated) DEVICE — DEVICE, ENDOSCOPIC VESSEL HARVESTING, SAPHENA VENAPAX

## (undated) DEVICE — PAD, DEFIB, CADENCE, ZOLL, ADULT, 10/CS

## (undated) DEVICE — SUTURE, PROLENE 4-0, TAPER POINT, SH-1 BLUE 30 INCH

## (undated) DEVICE — INSERT, CLAMP, SURGICAL, SOFT/TRACTION, STEALTH, 5 MM

## (undated) DEVICE — CASSETTE, BLOOD, PLEGIC SET

## (undated) DEVICE — ELECTRODE, ELECTROSURGICAL, BLADE EXT 4 INCH, INSULATED

## (undated) DEVICE — SOLUTION, IRRIGATION, USP, SODIUM CHLORIDE 0.9%, .9 NACL, 1000 ML, BAG

## (undated) DEVICE — DRAPE, TOWEL, SURGICAL, 17 X 27 IN, COTTON, BLUE, STERILE

## (undated) DEVICE — PAD, GROUNDING, ELECTROSURGICAL, W/9 FT CABLE, POLYHESIVE II, ADULT, LF

## (undated) DEVICE — ELECTRODE, ELECTROSURGICAL, BLADE, STANDARD, 2.75 IN

## (undated) DEVICE — Device

## (undated) DEVICE — SUTURE, VICRYL, 2-0, 36 IN, CT-1, UNDYED

## (undated) DEVICE — SUTURE, PROLENE, 5-0, 36 IN, C-1, CV-11, BLUE

## (undated) DEVICE — DRESSING, ALLEVYN, GENTAL LITE BORDER, 2 X 2

## (undated) DEVICE — SUTURE, PROLENE, 3-0, 36 IN, SH, DA, BLUE

## (undated) DEVICE — FILTER, IV, BLOOD, MICROAGGREGATE, 40 MIC, RBC TRANSFUSION

## (undated) DEVICE — ADHESIVE, SKIN, LIQUIBAND EXCEED

## (undated) DEVICE — SUTURE, VICRYL, 0, 36 IN, CT-1, UNDYED

## (undated) DEVICE — PACING CABLE, MYO LEAD, WHITE, 10FT

## (undated) DEVICE — BANDAGE, FLEX-WRAP, 4 IN X 5 YD, TAN, STERILE

## (undated) DEVICE — SUTURE, PROLENE, 7-0, 30 IN, BV1, DA, BLUE

## (undated) DEVICE — DRAPE, SHEET, CARDIOVASCULAR, ANTIMICROBIAL, W/ANESTHESIA SCREEN, IOBAN 2, STERI DRAPE, 107 X 133 IN, DISPOSABLE, FABRIC, BLUE, STERILE

## (undated) DEVICE — SUTURE, VICRYL, 0, 27 IN, CT-1, UNDYED

## (undated) DEVICE — SUTURE, VICRYL, 3-0, 27 IN, SH

## (undated) DEVICE — ACCESS KIT, MINI MAK, 4 FR X 10CM, 0.018 X 40CM, NITINOL/PLATINUM, STD NEEDLE

## (undated) DEVICE — PUNCH, AORTIC 4MM, BULLET TIP

## (undated) DEVICE — DRESSING, ISLAND, TELFA, 4 X 5 IN

## (undated) DEVICE — MARKER, SKIN, RULER AND LABEL PACK, CUSTOM

## (undated) DEVICE — CATHETER, DIAGNOSTIC, 4 FR-JR 4

## (undated) DEVICE — ADAPTER, Y, CORONARY PERFUSION

## (undated) DEVICE — MONITORING KIT, TRANSDUCER, RETROGRADE, MPS, W/EXTENSION, LF

## (undated) DEVICE — SUTURE, STEEL, 7, 18 IN, CCS, SILVER

## (undated) DEVICE — SHEATH, BRITE TIP 6 X 23

## (undated) DEVICE — DRAPE, INSTRUMENT, W/POUCH, STERI DRAPE, 7 X 11 IN, DISPOSABLE, STERILE

## (undated) DEVICE — CANNULA, VENOUS, 2-STAGE, 32/40 ROUND

## (undated) DEVICE — PERFUSION PACK, HEMOCONCENTRATOR, 0.25 TUBING 36 IN LONG

## (undated) DEVICE — NITINOL KIT, GLIDESHEATH, 6FR

## (undated) DEVICE — TUBING, SUCTION, CONNECTING, STERILE 0.25 X 120 IN., LF

## (undated) DEVICE — SUTURE, SURGICAL STEEL, STERNUM 7, 18 IN, KV40, SINGL-WIRE

## (undated) DEVICE — SURVIVAL KIT, EVEREST 30

## (undated) DEVICE — CATHETER, DIAGNOSTIC, 4 FR-JL 4

## (undated) DEVICE — SUTURE, MONOCRYL, 3-0, 27 IN, PS-2, UNDYED

## (undated) DEVICE — COLLECTION UNIT, DRAINAGE, THORACIC, SINGLE TUBE, DRY SUCTION, ATS COMPATIBLE, OASIS 3600, LF

## (undated) DEVICE — TUBING, INSUFFLATION, LAPAROSCOPIC, FILTER, 10 FT

## (undated) DEVICE — CLEANER, ELECTROSURGICAL, TIP, 5 X 5 CM, LF

## (undated) DEVICE — CANNULA, VENOUS, 20FR EOPA 3-D W/LUER, SOFTFLOW

## (undated) DEVICE — ADAPTER, CARDIOPLEGIA, VENTING, Y, 19.1 CM

## (undated) DEVICE — BLOWER MISTER KIT, CLEARVIEW, MALLEABLE SHAFT, W/TUBING, 16.5 CM

## (undated) DEVICE — DRESSING, ADHESIVE, ISLAND, TELFA, 2 X 3.75 IN, LF

## (undated) DEVICE — TUBE SET, PNEUMOLAR HEATED, SMOKE EVACU, HIGH-FLOW

## (undated) DEVICE — CANNULA, RETROGRADE, 14FR X 32CM

## (undated) DEVICE — PUMP, ROTAFLOW BIOLINE

## (undated) DEVICE — TUBING PACK, OXYGENATOR, ADULT

## (undated) DEVICE — CLIP, LIGATING, W/ADHESIVE PAD, MEDIUM, TITANIUM

## (undated) DEVICE — CONNECTOR, STRAIGHT, 0.375 X 0.375 IN

## (undated) DEVICE — DRESSING, WOUND, ALLEVYN LIFE, SACRUM, 17.2 X 17.5 CM

## (undated) DEVICE — SUTURE, SILK, 4-0, 18 IN, LABYRINTH, BLACK

## (undated) DEVICE — SUTURE, MONOCRYL, 4-0, 18 IN, PS2, UNDYED

## (undated) DEVICE — GEL, ULTRASOUND, AQUASONIC 100, 20 GM, STERILE

## (undated) DEVICE — BANDAGE, ELASTIC, FLEXMASTER, 6 IN, DBL LENGTH, STERILE

## (undated) DEVICE — COUNTER, NEEDLE, FOAM BLOCK, W/MAGNET, W/BLADE GUARD, 10 COUNT, RED, LF

## (undated) DEVICE — SYRINGE, 20 CC, LUER LOCK, MONOJECT, W/O CAP, LF

## (undated) DEVICE — SUTURE, ETHIBOND, XTRA, 30 IN, 0, CT-1, GREEN

## (undated) DEVICE — BLADE, SAW STERNUM, STERILE

## (undated) DEVICE — KNIFE, OPHTHALMIC, SLIT, 22.5 DEG

## (undated) DEVICE — KIT, TOURNIQUET, 7"

## (undated) DEVICE — SUTURE, MONOCRYL, 4-0, 27 IN, PS-2, UNDYED